# Patient Record
Sex: MALE | Race: WHITE | NOT HISPANIC OR LATINO | URBAN - METROPOLITAN AREA
[De-identification: names, ages, dates, MRNs, and addresses within clinical notes are randomized per-mention and may not be internally consistent; named-entity substitution may affect disease eponyms.]

---

## 2017-02-19 ENCOUNTER — EMERGENCY (EMERGENCY)
Facility: HOSPITAL | Age: 67
LOS: 1 days | Discharge: HOME | End: 2017-02-19
Attending: EMERGENCY MEDICINE | Admitting: EMERGENCY MEDICINE
Payer: COMMERCIAL

## 2017-02-19 VITALS
DIASTOLIC BLOOD PRESSURE: 60 MMHG | TEMPERATURE: 98 F | HEART RATE: 80 BPM | WEIGHT: 265 LBS | SYSTOLIC BLOOD PRESSURE: 166 MMHG | RESPIRATION RATE: 16 BRPM | OXYGEN SATURATION: 94 % | HEIGHT: 69 IN

## 2017-02-19 DIAGNOSIS — Z95.1 PRESENCE OF AORTOCORONARY BYPASS GRAFT: Chronic | ICD-10-CM

## 2017-02-19 DIAGNOSIS — Z98.89 OTHER SPECIFIED POSTPROCEDURAL STATES: Chronic | ICD-10-CM

## 2017-02-19 DIAGNOSIS — I77.0 ARTERIOVENOUS FISTULA, ACQUIRED: Chronic | ICD-10-CM

## 2017-02-19 PROCEDURE — 99284 EMERGENCY DEPT VISIT MOD MDM: CPT | Mod: 25

## 2017-02-19 PROCEDURE — 73590 X-RAY EXAM OF LOWER LEG: CPT

## 2017-02-19 PROCEDURE — 73590 X-RAY EXAM OF LOWER LEG: CPT | Mod: 26,LT

## 2017-02-19 PROCEDURE — 73562 X-RAY EXAM OF KNEE 3: CPT | Mod: 26,LT

## 2017-02-19 PROCEDURE — 73562 X-RAY EXAM OF KNEE 3: CPT

## 2017-02-19 PROCEDURE — 73502 X-RAY EXAM HIP UNI 2-3 VIEWS: CPT | Mod: 26,LT

## 2017-02-19 PROCEDURE — 73610 X-RAY EXAM OF ANKLE: CPT | Mod: 26,LT

## 2017-02-19 PROCEDURE — 29515 APPLICATION SHORT LEG SPLINT: CPT | Mod: LT

## 2017-02-19 PROCEDURE — 70450 CT HEAD/BRAIN W/O DYE: CPT | Mod: 26

## 2017-02-19 PROCEDURE — 73610 X-RAY EXAM OF ANKLE: CPT

## 2017-02-19 PROCEDURE — 70450 CT HEAD/BRAIN W/O DYE: CPT

## 2017-02-19 PROCEDURE — 29515 APPLICATION SHORT LEG SPLINT: CPT

## 2017-02-19 PROCEDURE — 73502 X-RAY EXAM HIP UNI 2-3 VIEWS: CPT

## 2017-02-19 RX ORDER — ACETAMINOPHEN WITH CODEINE 300MG-30MG
1 TABLET ORAL
Qty: 18 | Refills: 0 | OUTPATIENT
Start: 2017-02-19 | End: 2017-02-22

## 2017-02-19 RX ORDER — ACETAMINOPHEN 500 MG
650 TABLET ORAL ONCE
Qty: 0 | Refills: 0 | Status: COMPLETED | OUTPATIENT
Start: 2017-02-19 | End: 2017-02-19

## 2017-02-19 RX ADMIN — Medication 650 MILLIGRAM(S): at 21:44

## 2017-02-19 RX ADMIN — Medication 650 MILLIGRAM(S): at 21:14

## 2017-02-19 NOTE — ED STATDOCS - MUSCULOSKELETAL [+], MLM
TTP medial mallelous, +swelling, erythema, limited ROM secondary to pain, cap refill < 2sec, DP and PT pulses intact, shiny atrophic skin, calf soft NT, motor and sensory sensation intact, limited ROM secondary to pain, cap refill diminished.

## 2017-02-19 NOTE — ED STATDOCS - PMH
Atrial fibrillation    CAD (coronary artery disease)    ESRD (end stage renal disease) on dialysis    Gout    HLD (hyperlipidemia)    HTN (hypertension)    Ischemic cardiomyopathy    SALVADOR on CPAP    PVD (peripheral vascular disease)    Type 2 diabetes mellitus

## 2017-02-19 NOTE — ED PROCEDURE NOTE - CPROC ED POST PROC CARE GUIDE1
Verbal/written post procedure instructions were given to patient/caregiver./Instructed patient/caregiver to follow-up with primary care physician./Keep the cast/splint/dressing clean and dry./Elevate the injured extremity as instructed./Instructed patient/caregiver regarding signs and symptoms of infection.
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
## 2017-02-19 NOTE — ED STATDOCS - NS ED MD SCRIBE ATTENDING SCRIBE SECTIONS
HIV/PAST MEDICAL/SURGICAL/SOCIAL HISTORY/REVIEW OF SYSTEMS/INTAKE ASSESSMENT/SCREENINGS/RESULTS/DISPOSITION/PROGRESS NOTE/CONSULTATIONS/SHIFT CHANGE/PHYSICAL EXAM/HISTORY OF PRESENT ILLNESS/VITAL SIGNS( Pullset)
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
## 2017-02-19 NOTE — ED ADULT NURSE NOTE - PRIMARY CARE PROVIDER
This note will not be viewable in [address] Attempted to reach pt for f/u CCM services, left vm message with RNCM contact information. Will continue attempts to reach pt. mili

## 2017-02-19 NOTE — ED STATDOCS - DETAILS:
IBhavin performed the initial face to face bedside interview with this patient regarding history of present illness, review of symptoms and relevant past medical, social and family history.  I completed an independent physical examination.  I was the initial provider who evaluated this patient.  The history, relevant review of systems, past medical and surgical history, medical decision making, and physical examination was documented by the scribe in my presence and I attest to the accuracy of the documentation.  I have signed out the follow up of any pending tests (i.e. labs, radiological studies) to the ACP.  I have communicated the patient’s plan of care and disposition with the ACP.
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
## 2017-02-27 ENCOUNTER — APPOINTMENT (OUTPATIENT)
Dept: ORTHOPEDIC SURGERY | Facility: CLINIC | Age: 67
End: 2017-02-27

## 2017-02-27 VITALS
BODY MASS INDEX: 38.51 KG/M2 | HEART RATE: 42 BPM | DIASTOLIC BLOOD PRESSURE: 50 MMHG | TEMPERATURE: 98.2 F | WEIGHT: 260 LBS | SYSTOLIC BLOOD PRESSURE: 125 MMHG | HEIGHT: 69 IN

## 2017-02-27 DIAGNOSIS — Z86.69 PERSONAL HISTORY OF OTHER DISEASES OF THE NERVOUS SYSTEM AND SENSE ORGANS: ICD-10-CM

## 2017-02-27 DIAGNOSIS — Z80.9 FAMILY HISTORY OF MALIGNANT NEOPLASM, UNSPECIFIED: ICD-10-CM

## 2017-02-27 DIAGNOSIS — Z86.39 PERSONAL HISTORY OF OTHER ENDOCRINE, NUTRITIONAL AND METABOLIC DISEASE: ICD-10-CM

## 2017-02-27 DIAGNOSIS — Z78.9 OTHER SPECIFIED HEALTH STATUS: ICD-10-CM

## 2017-02-27 DIAGNOSIS — Z86.79 PERSONAL HISTORY OF OTHER DISEASES OF THE CIRCULATORY SYSTEM: ICD-10-CM

## 2017-02-27 DIAGNOSIS — Z99.2 DEPENDENCE ON RENAL DIALYSIS: ICD-10-CM

## 2017-02-27 RX ORDER — EZETIMIBE 10 MG/1
TABLET ORAL
Refills: 0 | Status: ACTIVE | COMMUNITY

## 2017-02-27 RX ORDER — WARFARIN SODIUM 2 MG/ML
INJECTION, POWDER, LYOPHILIZED, FOR SOLUTION INTRAVENOUS
Refills: 0 | Status: ACTIVE | COMMUNITY

## 2017-02-27 RX ORDER — METOPROLOL TARTRATE 1 MG/ML
INJECTION, SOLUTION INTRAVENOUS
Refills: 0 | Status: ACTIVE | COMMUNITY

## 2017-02-27 RX ORDER — ROSUVASTATIN CALCIUM 5 MG/1
5 TABLET, FILM COATED ORAL
Refills: 0 | Status: ACTIVE | COMMUNITY

## 2017-02-27 RX ORDER — INSULIN ASPART 100 [IU]/ML
INJECTION, SUSPENSION SUBCUTANEOUS
Refills: 0 | Status: ACTIVE | COMMUNITY

## 2017-02-27 RX ORDER — AMLODIPINE BESYLATE 10 MG/1
10 TABLET ORAL
Refills: 0 | Status: ACTIVE | COMMUNITY

## 2017-02-27 RX ORDER — INSULIN GLARGINE 100 [IU]/ML
100 INJECTION, SOLUTION SUBCUTANEOUS
Refills: 0 | Status: ACTIVE | COMMUNITY

## 2017-02-27 RX ORDER — FINASTERIDE 5 MG/1
5 TABLET, FILM COATED ORAL
Refills: 0 | Status: ACTIVE | COMMUNITY

## 2017-03-01 ENCOUNTER — FORM ENCOUNTER (OUTPATIENT)
Age: 67
End: 2017-03-01

## 2017-03-02 ENCOUNTER — APPOINTMENT (OUTPATIENT)
Dept: MRI IMAGING | Facility: CLINIC | Age: 67
End: 2017-03-02

## 2017-03-02 ENCOUNTER — OUTPATIENT (OUTPATIENT)
Dept: OUTPATIENT SERVICES | Facility: HOSPITAL | Age: 67
LOS: 1 days | End: 2017-03-02
Payer: COMMERCIAL

## 2017-03-02 DIAGNOSIS — Z98.89 OTHER SPECIFIED POSTPROCEDURAL STATES: Chronic | ICD-10-CM

## 2017-03-02 DIAGNOSIS — M25.552 PAIN IN LEFT HIP: ICD-10-CM

## 2017-03-02 DIAGNOSIS — I77.0 ARTERIOVENOUS FISTULA, ACQUIRED: Chronic | ICD-10-CM

## 2017-03-02 DIAGNOSIS — Z95.1 PRESENCE OF AORTOCORONARY BYPASS GRAFT: Chronic | ICD-10-CM

## 2017-03-02 DIAGNOSIS — Z00.8 ENCOUNTER FOR OTHER GENERAL EXAMINATION: ICD-10-CM

## 2017-03-02 PROCEDURE — 73721 MRI JNT OF LWR EXTRE W/O DYE: CPT

## 2017-03-10 ENCOUNTER — APPOINTMENT (OUTPATIENT)
Dept: ORTHOPEDIC SURGERY | Facility: CLINIC | Age: 67
End: 2017-03-10

## 2017-03-10 VITALS
SYSTOLIC BLOOD PRESSURE: 121 MMHG | DIASTOLIC BLOOD PRESSURE: 68 MMHG | TEMPERATURE: 98.6 F | HEART RATE: 61 BPM | BODY MASS INDEX: 38.51 KG/M2 | HEIGHT: 69 IN | WEIGHT: 260 LBS

## 2017-03-10 DIAGNOSIS — S73.102A UNSPECIFIED SPRAIN OF LEFT HIP, INITIAL ENCOUNTER: ICD-10-CM

## 2017-03-24 ENCOUNTER — OUTPATIENT (OUTPATIENT)
Dept: OUTPATIENT SERVICES | Facility: HOSPITAL | Age: 67
LOS: 1 days | End: 2017-03-24
Payer: COMMERCIAL

## 2017-03-24 DIAGNOSIS — Z98.89 OTHER SPECIFIED POSTPROCEDURAL STATES: Chronic | ICD-10-CM

## 2017-03-24 DIAGNOSIS — S73.102D UNSPECIFIED SPRAIN OF LEFT HIP, SUBSEQUENT ENCOUNTER: ICD-10-CM

## 2017-03-24 DIAGNOSIS — Z95.1 PRESENCE OF AORTOCORONARY BYPASS GRAFT: Chronic | ICD-10-CM

## 2017-03-24 DIAGNOSIS — S82.55XD NONDISPLACED FRACTURE OF MEDIAL MALLEOLUS OF LEFT TIBIA, SUBSEQUENT ENCOUNTER FOR CLOSED FRACTURE WITH ROUTINE HEALING: ICD-10-CM

## 2017-03-24 DIAGNOSIS — Z51.89 ENCOUNTER FOR OTHER SPECIFIED AFTERCARE: ICD-10-CM

## 2017-03-24 DIAGNOSIS — I77.0 ARTERIOVENOUS FISTULA, ACQUIRED: Chronic | ICD-10-CM

## 2017-03-30 ENCOUNTER — APPOINTMENT (OUTPATIENT)
Dept: ORTHOPEDIC SURGERY | Facility: CLINIC | Age: 67
End: 2017-03-30

## 2017-03-30 VITALS
HEIGHT: 69 IN | BODY MASS INDEX: 38.51 KG/M2 | DIASTOLIC BLOOD PRESSURE: 63 MMHG | SYSTOLIC BLOOD PRESSURE: 142 MMHG | HEART RATE: 57 BPM | TEMPERATURE: 98.7 F | WEIGHT: 260 LBS

## 2017-04-04 PROCEDURE — 97163 PT EVAL HIGH COMPLEX 45 MIN: CPT

## 2017-04-04 PROCEDURE — 97010 HOT OR COLD PACKS THERAPY: CPT

## 2017-04-04 PROCEDURE — 97110 THERAPEUTIC EXERCISES: CPT

## 2017-04-04 PROCEDURE — 97116 GAIT TRAINING THERAPY: CPT

## 2017-05-01 ENCOUNTER — APPOINTMENT (OUTPATIENT)
Dept: ORTHOPEDIC SURGERY | Facility: CLINIC | Age: 67
End: 2017-05-01

## 2017-06-12 ENCOUNTER — APPOINTMENT (OUTPATIENT)
Dept: ORTHOPEDIC SURGERY | Facility: CLINIC | Age: 67
End: 2017-06-12

## 2017-06-12 VITALS
HEIGHT: 69 IN | WEIGHT: 260 LBS | HEART RATE: 64 BPM | DIASTOLIC BLOOD PRESSURE: 68 MMHG | SYSTOLIC BLOOD PRESSURE: 140 MMHG | BODY MASS INDEX: 38.51 KG/M2

## 2017-06-21 ENCOUNTER — APPOINTMENT (OUTPATIENT)
Dept: CT IMAGING | Facility: CLINIC | Age: 67
End: 2017-06-21

## 2017-06-21 ENCOUNTER — OUTPATIENT (OUTPATIENT)
Dept: OUTPATIENT SERVICES | Facility: HOSPITAL | Age: 67
LOS: 1 days | End: 2017-06-21
Payer: COMMERCIAL

## 2017-06-21 DIAGNOSIS — Z98.89 OTHER SPECIFIED POSTPROCEDURAL STATES: Chronic | ICD-10-CM

## 2017-06-21 DIAGNOSIS — I77.0 ARTERIOVENOUS FISTULA, ACQUIRED: Chronic | ICD-10-CM

## 2017-06-21 DIAGNOSIS — Z95.1 PRESENCE OF AORTOCORONARY BYPASS GRAFT: Chronic | ICD-10-CM

## 2017-06-21 DIAGNOSIS — R91.1 SOLITARY PULMONARY NODULE: ICD-10-CM

## 2017-06-21 PROCEDURE — 71250 CT THORAX DX C-: CPT | Mod: 26

## 2017-06-21 PROCEDURE — 71250 CT THORAX DX C-: CPT

## 2017-08-14 ENCOUNTER — APPOINTMENT (OUTPATIENT)
Dept: ORTHOPEDIC SURGERY | Facility: CLINIC | Age: 67
End: 2017-08-14
Payer: COMMERCIAL

## 2017-08-14 VITALS
TEMPERATURE: 97.9 F | BODY MASS INDEX: 38.51 KG/M2 | DIASTOLIC BLOOD PRESSURE: 55 MMHG | HEIGHT: 69 IN | HEART RATE: 49 BPM | SYSTOLIC BLOOD PRESSURE: 158 MMHG | WEIGHT: 260 LBS

## 2017-08-14 DIAGNOSIS — S82.425D: ICD-10-CM

## 2017-08-14 PROCEDURE — 99214 OFFICE O/P EST MOD 30 MIN: CPT

## 2017-08-14 PROCEDURE — 73610 X-RAY EXAM OF ANKLE: CPT | Mod: LT

## 2017-08-30 ENCOUNTER — OUTPATIENT (OUTPATIENT)
Dept: OUTPATIENT SERVICES | Facility: HOSPITAL | Age: 67
LOS: 1 days | End: 2017-08-30
Payer: COMMERCIAL

## 2017-08-30 DIAGNOSIS — Z51.89 ENCOUNTER FOR OTHER SPECIFIED AFTERCARE: ICD-10-CM

## 2017-08-30 DIAGNOSIS — S82.55XD NONDISPLACED FRACTURE OF MEDIAL MALLEOLUS OF LEFT TIBIA, SUBSEQUENT ENCOUNTER FOR CLOSED FRACTURE WITH ROUTINE HEALING: ICD-10-CM

## 2017-08-30 DIAGNOSIS — Z98.89 OTHER SPECIFIED POSTPROCEDURAL STATES: Chronic | ICD-10-CM

## 2017-08-30 DIAGNOSIS — I77.0 ARTERIOVENOUS FISTULA, ACQUIRED: Chronic | ICD-10-CM

## 2017-08-30 DIAGNOSIS — Z95.1 PRESENCE OF AORTOCORONARY BYPASS GRAFT: Chronic | ICD-10-CM

## 2017-11-20 ENCOUNTER — APPOINTMENT (OUTPATIENT)
Dept: ORTHOPEDIC SURGERY | Facility: CLINIC | Age: 67
End: 2017-11-20
Payer: MEDICARE

## 2017-11-20 VITALS
HEIGHT: 69 IN | DIASTOLIC BLOOD PRESSURE: 70 MMHG | BODY MASS INDEX: 38.51 KG/M2 | SYSTOLIC BLOOD PRESSURE: 172 MMHG | HEART RATE: 56 BPM | WEIGHT: 260 LBS

## 2017-11-20 DIAGNOSIS — S82.55XD NONDISPLACED FRACTURE OF MEDIAL MALLEOLUS OF LEFT TIBIA, SUBSEQUENT ENCOUNTER FOR CLOSED FRACTURE WITH ROUTINE HEALING: ICD-10-CM

## 2017-11-20 DIAGNOSIS — L97.322 NON-PRESSURE CHRONIC ULCER OF LEFT ANKLE WITH FAT LAYER EXPOSED: ICD-10-CM

## 2017-11-20 DIAGNOSIS — M25.552 PAIN IN LEFT HIP: ICD-10-CM

## 2017-11-20 PROCEDURE — 73610 X-RAY EXAM OF ANKLE: CPT | Mod: LT

## 2017-11-20 PROCEDURE — 99215 OFFICE O/P EST HI 40 MIN: CPT

## 2017-11-20 PROCEDURE — 73502 X-RAY EXAM HIP UNI 2-3 VIEWS: CPT | Mod: LT

## 2017-11-20 RX ORDER — FOLIC ACID/VIT B COMPLEX AND C 0.8 MG
TABLET ORAL
Qty: 90 | Refills: 0 | Status: ACTIVE | COMMUNITY
Start: 2016-05-16

## 2017-11-21 PROBLEM — L97.322 SKIN ULCER OF LEFT ANKLE WITH FAT LAYER EXPOSED: Status: ACTIVE | Noted: 2017-06-12

## 2017-11-21 PROBLEM — S82.55XD CLOSED NONDISPLACED FRACTURE OF MEDIAL MALLEOLUS OF LEFT TIBIA WITH ROUTINE HEALING: Status: ACTIVE | Noted: 2017-02-27

## 2018-01-12 PROCEDURE — G8978: CPT | Mod: CK

## 2018-01-12 PROCEDURE — 97110 THERAPEUTIC EXERCISES: CPT

## 2018-01-12 PROCEDURE — 97010 HOT OR COLD PACKS THERAPY: CPT

## 2018-01-12 PROCEDURE — G8979: CPT | Mod: CJ

## 2018-01-12 PROCEDURE — 97163 PT EVAL HIGH COMPLEX 45 MIN: CPT

## 2018-01-12 PROCEDURE — 97140 MANUAL THERAPY 1/> REGIONS: CPT

## 2018-01-12 PROCEDURE — 97750 PHYSICAL PERFORMANCE TEST: CPT

## 2018-06-07 ENCOUNTER — APPOINTMENT (OUTPATIENT)
Dept: ORTHOPEDIC SURGERY | Facility: CLINIC | Age: 68
End: 2018-06-07

## 2018-06-18 ENCOUNTER — OTHER (OUTPATIENT)
Age: 68
End: 2018-06-18

## 2018-06-25 ENCOUNTER — APPOINTMENT (OUTPATIENT)
Dept: ORTHOPEDIC SURGERY | Facility: CLINIC | Age: 68
End: 2018-06-25
Payer: MEDICARE

## 2018-06-25 VITALS — WEIGHT: 260 LBS | BODY MASS INDEX: 38.51 KG/M2 | HEIGHT: 69 IN

## 2018-06-25 PROCEDURE — 99214 OFFICE O/P EST MOD 30 MIN: CPT

## 2018-06-25 PROCEDURE — 73502 X-RAY EXAM HIP UNI 2-3 VIEWS: CPT | Mod: LT

## 2018-10-10 ENCOUNTER — EMERGENCY (EMERGENCY)
Facility: HOSPITAL | Age: 68
LOS: 1 days | Discharge: OTHER INSTITUTION | End: 2018-10-10
Attending: EMERGENCY MEDICINE
Payer: MEDICARE

## 2018-10-10 VITALS
DIASTOLIC BLOOD PRESSURE: 82 MMHG | TEMPERATURE: 99 F | HEART RATE: 115 BPM | HEIGHT: 70 IN | RESPIRATION RATE: 22 BRPM | SYSTOLIC BLOOD PRESSURE: 181 MMHG | OXYGEN SATURATION: 96 % | WEIGHT: 259.93 LBS

## 2018-10-10 VITALS
TEMPERATURE: 99 F | DIASTOLIC BLOOD PRESSURE: 77 MMHG | OXYGEN SATURATION: 100 % | RESPIRATION RATE: 20 BRPM | SYSTOLIC BLOOD PRESSURE: 162 MMHG | HEART RATE: 110 BPM

## 2018-10-10 DIAGNOSIS — Z98.89 OTHER SPECIFIED POSTPROCEDURAL STATES: Chronic | ICD-10-CM

## 2018-10-10 DIAGNOSIS — I77.0 ARTERIOVENOUS FISTULA, ACQUIRED: Chronic | ICD-10-CM

## 2018-10-10 DIAGNOSIS — Z95.1 PRESENCE OF AORTOCORONARY BYPASS GRAFT: Chronic | ICD-10-CM

## 2018-10-10 LAB
ALBUMIN SERPL ELPH-MCNC: 4.1 G/DL — SIGNIFICANT CHANGE UP (ref 3.3–5.2)
ALP SERPL-CCNC: 115 U/L — SIGNIFICANT CHANGE UP (ref 40–120)
ALT FLD-CCNC: 24 U/L — SIGNIFICANT CHANGE UP
ANION GAP SERPL CALC-SCNC: 15 MMOL/L — SIGNIFICANT CHANGE UP (ref 5–17)
APPEARANCE UR: CLEAR — SIGNIFICANT CHANGE UP
APTT BLD: 33 SEC — SIGNIFICANT CHANGE UP (ref 27.5–37.4)
AST SERPL-CCNC: 38 U/L — SIGNIFICANT CHANGE UP
BACTERIA # UR AUTO: ABNORMAL
BASOPHILS NFR BLD AUTO: 1 % — SIGNIFICANT CHANGE UP (ref 0–2)
BILIRUB SERPL-MCNC: 0.9 MG/DL — SIGNIFICANT CHANGE UP (ref 0.4–2)
BILIRUB UR-MCNC: NEGATIVE — SIGNIFICANT CHANGE UP
BUN SERPL-MCNC: 36 MG/DL — HIGH (ref 8–20)
CALCIUM SERPL-MCNC: 9.9 MG/DL — SIGNIFICANT CHANGE UP (ref 8.6–10.2)
CHLORIDE SERPL-SCNC: 106 MMOL/L — SIGNIFICANT CHANGE UP (ref 98–107)
CO2 SERPL-SCNC: 19 MMOL/L — LOW (ref 22–29)
COLOR SPEC: YELLOW — SIGNIFICANT CHANGE UP
CREAT SERPL-MCNC: 1.18 MG/DL — SIGNIFICANT CHANGE UP (ref 0.5–1.3)
DIFF PNL FLD: ABNORMAL
EPI CELLS # UR: NEGATIVE — SIGNIFICANT CHANGE UP
GLUCOSE SERPL-MCNC: 268 MG/DL — HIGH (ref 70–115)
GLUCOSE UR QL: 1000 MG/DL
HCT VFR BLD CALC: 40.2 % — LOW (ref 42–52)
HGB BLD-MCNC: 12.5 G/DL — LOW (ref 14–18)
INR BLD: 1.62 RATIO — HIGH (ref 0.88–1.16)
KETONES UR-MCNC: ABNORMAL
LACTATE BLDV-MCNC: 1.5 MMOL/L — SIGNIFICANT CHANGE UP (ref 0.5–2)
LEUKOCYTE ESTERASE UR-ACNC: ABNORMAL
LYMPHOCYTES # BLD AUTO: 5 % — LOW (ref 20–55)
MCHC RBC-ENTMCNC: 29.8 PG — SIGNIFICANT CHANGE UP (ref 27–31)
MCHC RBC-ENTMCNC: 31.1 G/DL — LOW (ref 32–36)
MCV RBC AUTO: 95.7 FL — HIGH (ref 80–94)
MONOCYTES NFR BLD AUTO: 4 % — SIGNIFICANT CHANGE UP (ref 3–10)
NEUTROPHILS NFR BLD AUTO: 86 % — HIGH (ref 37–73)
NITRITE UR-MCNC: NEGATIVE — SIGNIFICANT CHANGE UP
PH UR: 6 — SIGNIFICANT CHANGE UP (ref 5–8)
PLATELET # BLD AUTO: 165 K/UL — SIGNIFICANT CHANGE UP (ref 150–400)
POTASSIUM SERPL-MCNC: 4 MMOL/L — SIGNIFICANT CHANGE UP (ref 3.5–5.3)
POTASSIUM SERPL-SCNC: 4 MMOL/L — SIGNIFICANT CHANGE UP (ref 3.5–5.3)
PROT SERPL-MCNC: 6.8 G/DL — SIGNIFICANT CHANGE UP (ref 6.6–8.7)
PROT UR-MCNC: 100 MG/DL
PROTHROM AB SERPL-ACNC: 18 SEC — HIGH (ref 9.8–12.7)
RBC # BLD: 4.2 M/UL — LOW (ref 4.6–6.2)
RBC # FLD: 15.1 % — SIGNIFICANT CHANGE UP (ref 11–15.6)
RBC CASTS # UR COMP ASSIST: ABNORMAL /HPF (ref 0–4)
SODIUM SERPL-SCNC: 140 MMOL/L — SIGNIFICANT CHANGE UP (ref 135–145)
SP GR SPEC: 1.02 — SIGNIFICANT CHANGE UP (ref 1.01–1.02)
UROBILINOGEN FLD QL: NEGATIVE MG/DL — SIGNIFICANT CHANGE UP
WBC # BLD: 11.1 K/UL — HIGH (ref 4.8–10.8)
WBC # FLD AUTO: 11.1 K/UL — HIGH (ref 4.8–10.8)
WBC UR QL: ABNORMAL

## 2018-10-10 PROCEDURE — 96375 TX/PRO/DX INJ NEW DRUG ADDON: CPT

## 2018-10-10 PROCEDURE — 85027 COMPLETE CBC AUTOMATED: CPT

## 2018-10-10 PROCEDURE — 96365 THER/PROPH/DIAG IV INF INIT: CPT

## 2018-10-10 PROCEDURE — 85730 THROMBOPLASTIN TIME PARTIAL: CPT

## 2018-10-10 PROCEDURE — 36415 COLL VENOUS BLD VENIPUNCTURE: CPT

## 2018-10-10 PROCEDURE — 81001 URINALYSIS AUTO W/SCOPE: CPT

## 2018-10-10 PROCEDURE — 74176 CT ABD & PELVIS W/O CONTRAST: CPT

## 2018-10-10 PROCEDURE — 87040 BLOOD CULTURE FOR BACTERIA: CPT

## 2018-10-10 PROCEDURE — 74176 CT ABD & PELVIS W/O CONTRAST: CPT | Mod: 26

## 2018-10-10 PROCEDURE — 73630 X-RAY EXAM OF FOOT: CPT | Mod: 26,RT

## 2018-10-10 PROCEDURE — 87086 URINE CULTURE/COLONY COUNT: CPT

## 2018-10-10 PROCEDURE — 73630 X-RAY EXAM OF FOOT: CPT

## 2018-10-10 PROCEDURE — 71045 X-RAY EXAM CHEST 1 VIEW: CPT

## 2018-10-10 PROCEDURE — 96372 THER/PROPH/DIAG INJ SC/IM: CPT | Mod: XU

## 2018-10-10 PROCEDURE — 80053 COMPREHEN METABOLIC PANEL: CPT

## 2018-10-10 PROCEDURE — 83605 ASSAY OF LACTIC ACID: CPT

## 2018-10-10 PROCEDURE — 99291 CRITICAL CARE FIRST HOUR: CPT

## 2018-10-10 PROCEDURE — 82962 GLUCOSE BLOOD TEST: CPT

## 2018-10-10 PROCEDURE — 85610 PROTHROMBIN TIME: CPT

## 2018-10-10 PROCEDURE — 99285 EMERGENCY DEPT VISIT HI MDM: CPT | Mod: 25

## 2018-10-10 PROCEDURE — 71045 X-RAY EXAM CHEST 1 VIEW: CPT | Mod: 26

## 2018-10-10 RX ORDER — CEFEPIME 1 G/1
2000 INJECTION, POWDER, FOR SOLUTION INTRAMUSCULAR; INTRAVENOUS EVERY 12 HOURS
Qty: 0 | Refills: 0 | Status: DISCONTINUED | OUTPATIENT
Start: 2018-10-10 | End: 2018-10-15

## 2018-10-10 RX ORDER — FUROSEMIDE 40 MG
0 TABLET ORAL
Qty: 0 | Refills: 0 | COMMUNITY

## 2018-10-10 RX ORDER — INSULIN HUMAN 100 [IU]/ML
10 INJECTION, SOLUTION SUBCUTANEOUS ONCE
Qty: 0 | Refills: 0 | Status: COMPLETED | OUTPATIENT
Start: 2018-10-10 | End: 2018-10-10

## 2018-10-10 RX ORDER — SODIUM CHLORIDE 9 MG/ML
3700 INJECTION INTRAMUSCULAR; INTRAVENOUS; SUBCUTANEOUS ONCE
Qty: 0 | Refills: 0 | Status: COMPLETED | OUTPATIENT
Start: 2018-10-10 | End: 2018-10-10

## 2018-10-10 RX ORDER — WARFARIN SODIUM 2.5 MG/1
0 TABLET ORAL
Qty: 0 | Refills: 0 | COMMUNITY

## 2018-10-10 RX ORDER — ROSUVASTATIN CALCIUM 5 MG/1
0 TABLET ORAL
Qty: 0 | Refills: 0 | COMMUNITY

## 2018-10-10 RX ORDER — ACETAMINOPHEN 500 MG
975 TABLET ORAL ONCE
Qty: 0 | Refills: 0 | Status: COMPLETED | OUTPATIENT
Start: 2018-10-10 | End: 2018-10-10

## 2018-10-10 RX ORDER — ACETAMINOPHEN 500 MG
650 TABLET ORAL ONCE
Qty: 0 | Refills: 0 | Status: COMPLETED | OUTPATIENT
Start: 2018-10-10 | End: 2018-10-10

## 2018-10-10 RX ORDER — VANCOMYCIN HCL 1 G
1000 VIAL (EA) INTRAVENOUS ONCE
Qty: 0 | Refills: 0 | Status: COMPLETED | OUTPATIENT
Start: 2018-10-10 | End: 2018-10-10

## 2018-10-10 RX ORDER — INSULIN HUMAN 100 [IU]/ML
0 INJECTION, SOLUTION SUBCUTANEOUS
Qty: 0 | Refills: 0 | COMMUNITY

## 2018-10-10 RX ORDER — PIPERACILLIN AND TAZOBACTAM 4; .5 G/20ML; G/20ML
3.38 INJECTION, POWDER, LYOPHILIZED, FOR SOLUTION INTRAVENOUS ONCE
Qty: 0 | Refills: 0 | Status: DISCONTINUED | OUTPATIENT
Start: 2018-10-10 | End: 2018-10-10

## 2018-10-10 RX ORDER — EZETIMIBE 10 MG/1
0 TABLET ORAL
Qty: 0 | Refills: 0 | COMMUNITY

## 2018-10-10 RX ORDER — INSULIN GLARGINE 100 [IU]/ML
10 INJECTION, SOLUTION SUBCUTANEOUS AT BEDTIME
Qty: 0 | Refills: 0 | Status: DISCONTINUED | OUTPATIENT
Start: 2018-10-10 | End: 2018-10-15

## 2018-10-10 RX ORDER — AMLODIPINE BESYLATE 2.5 MG/1
0 TABLET ORAL
Qty: 0 | Refills: 0 | COMMUNITY

## 2018-10-10 RX ORDER — CEFTRIAXONE 500 MG/1
1 INJECTION, POWDER, FOR SOLUTION INTRAMUSCULAR; INTRAVENOUS ONCE
Qty: 0 | Refills: 0 | Status: DISCONTINUED | OUTPATIENT
Start: 2018-10-10 | End: 2018-10-10

## 2018-10-10 RX ORDER — SEVELAMER CARBONATE 2400 MG/1
0 POWDER, FOR SUSPENSION ORAL
Qty: 0 | Refills: 0 | COMMUNITY

## 2018-10-10 RX ORDER — METOPROLOL TARTRATE 50 MG
0 TABLET ORAL
Qty: 0 | Refills: 0 | COMMUNITY

## 2018-10-10 RX ORDER — INSULIN GLARGINE 100 [IU]/ML
0 INJECTION, SOLUTION SUBCUTANEOUS
Qty: 0 | Refills: 0 | COMMUNITY

## 2018-10-10 RX ORDER — FINASTERIDE 5 MG/1
0 TABLET, FILM COATED ORAL
Qty: 0 | Refills: 0 | COMMUNITY

## 2018-10-10 RX ADMIN — CEFEPIME 2000 MILLIGRAM(S): 1 INJECTION, POWDER, FOR SOLUTION INTRAMUSCULAR; INTRAVENOUS at 19:07

## 2018-10-10 RX ADMIN — Medication 975 MILLIGRAM(S): at 18:50

## 2018-10-10 RX ADMIN — INSULIN HUMAN 10 UNIT(S): 100 INJECTION, SOLUTION SUBCUTANEOUS at 22:29

## 2018-10-10 RX ADMIN — Medication 250 MILLIGRAM(S): at 19:07

## 2018-10-10 RX ADMIN — Medication 975 MILLIGRAM(S): at 17:49

## 2018-10-10 RX ADMIN — CEFEPIME 100 MILLIGRAM(S): 1 INJECTION, POWDER, FOR SOLUTION INTRAMUSCULAR; INTRAVENOUS at 17:49

## 2018-10-10 RX ADMIN — INSULIN GLARGINE 10 UNIT(S): 100 INJECTION, SOLUTION SUBCUTANEOUS at 22:17

## 2018-10-10 RX ADMIN — SODIUM CHLORIDE 3700 MILLILITER(S): 9 INJECTION INTRAMUSCULAR; INTRAVENOUS; SUBCUTANEOUS at 17:40

## 2018-10-10 NOTE — ED PROVIDER NOTE - CARE PLAN
Principal Discharge DX:	Sepsis, due to unspecified organism  Secondary Diagnosis:	Hematuria, unspecified type

## 2018-10-10 NOTE — ED ADULT NURSE REASSESSMENT NOTE - NS ED NURSE REASSESS COMMENT FT1
As per Dr. Gonzalez, ok for pt to take home meds including Cellcept, Prograf and Eliquis. As per Dr. Gonzalez, ok for pt to take home meds including Cellcept, Prograf and Eliquis.  Pt placed on 2LNC as per Dr. Gonzalez,

## 2018-10-10 NOTE — ED PROVIDER NOTE - OBJECTIVE STATEMENT
67 year old male with PMH renal transplant 7/7 that was subsequently complicated with a wound dehiscence in August requiring wound vac to be placed, CAD, DM, HTN, HLD presents with fever and hematuria. Pt states that he had a general malaise and body aches yesterday. Then beginning today he began to have fever, chills, and hematuria. He denies chest pain, SOB, abd pain, nausea, vomiting, diarrhea.

## 2018-10-10 NOTE — CONSULT NOTE ADULT - SUBJECTIVE AND OBJECTIVE BOX
Ricky is a 67y old  Male who presents with a chief complaint of  hematuria.    HPI:   Hx renal transplant. Pt has been feeling ill for past couple of days and noted gross hematuria this am with temp 100.3 at home . He also had high glucose. Hx of DM and has been taking his meds. Pt had seroma post transplant and still has wound vac.    PAST MEDICAL & SURGICAL HISTORY:  SALVADOR on CPAP  Gout  PVD (peripheral vascular disease)  Atrial fibrillation  CAD (coronary artery disease)  HLD (hyperlipidemia)  Ischemic cardiomyopathy  HTN (hypertension)  Type 2 diabetes mellitus  ESRD (end stage renal disease) on dialysis  AV fistula  History of vitrectomy  S/P CABG x 2  History of varicose vein stripping      FAMILY HISTORY:  [place]    Social History: Non smoker    MEDICATIONS  (STANDING):  cefepime   IVPB 2000 milliGRAM(s) IV Intermittent every 12 hours  insulin glargine Injectable (LANTUS) 10 Unit(s) SubCutaneous at bedtime    MEDICATIONS  (PRN):   Meds reviewed    Allergies    No Known Allergies        REVIEW OF SYSTEMS:    CONSTITUTIONAL:   feels weak  EYES: No eye pain, visual disturbances, or discharge  ENMT:  No difficulty hearing, tinnitus, vertigo; No sinus or throat pain  NECK: No pain or stiffness  BREASTS: No pain, masses, or nipple discharge  RESPIRATORY: neg  CARDIOVASCULAR: No chest pain, palpitations, dizziness,   GASTROINTESTINAL: No abdominal or epigastric pain. No nausea, vomiting, or hematemesis; No diarrhea or constipation LLQ Vac with skin erythema.   GENITOURINARY: + hematuria,   NEUROLOGICAL: No headaches, memory loss, loss of strength, numbness, or tremors  SKIN: neg  LYMPH NODES: No enlarged glands  ENDOCRINE: No heat or cold intolerance; No hair loss  MUSCULOSKELETAL: Chronic leg  edema legs   PSYCHIATRIC: No depression, anxiety, mood swings, or difficulty sleeping  HEME/LYMPH: No easy bruising, or bleeding gums  ALLERY AND IMMUNOLOGIC: No hives or eczema      Vital Signs Last 24 Hrs  T(C): 36.6 (10 Oct 2018 20:06), Max: 38.1 (10 Oct 2018 17:22)  T(F): 97.9 (10 Oct 2018 20:06), Max: 100.6 (10 Oct 2018 17:22)  HR: 93 (10 Oct 2018 21:30) (93 - 115)  BP: 166/77 (10 Oct 2018 21:30) (127/63 - 181/82)  BP(mean): --  RR: 20 (10 Oct 2018 21:30) (20 - 22)  SpO2: 99% (10 Oct 2018 21:30) (93% - 99%)  Daily Height in cm: 177.8 (10 Oct 2018 16:51)        PHYSICAL EXAM:    GENERAL: appears chronically ill, oriented.  HEAD:  Atraumatic, Normocephalic  EYES: EOMI, PERRLA, conjunctiva and sclera clear  ENMT: No tonsillar erythema, exudates, or enlargement; Moist mucous membranes,   NECK: Supple, neck  veins   NERVOUS SYSTEM:  Alert & Oriented X3, Good concentration; Motor Strength wnl upper and lower extremities;  CHEST/LUNG: Clear to percussion bilaterally; No rales, rhonchi, wheezing, or rubs  HEART: IRegular rate; No  rubs, or gallops  ABDOMEN: Soft, Nontender, distended; Bowel sounds present, body wall and flank edema with wound vac.  EXTREMITIES:  Leg edema, arm access  LYMPH: No lymphadenopathy noted  SKIN: No rashes or lesions, pale      LABS:                        12.5   11.1  )-----------( 165      ( 10 Oct 2018 17:43 )             40.2     10-10    140  |  106  |  36.0<H>  ----------------------------<  268<H>  4.0   |  19.0<L>  |  1.18    Ca    9.9      10 Oct 2018 17:43    TPro  6.8  /  Alb  4.1  /  TBili  0.9  /  DBili  x   /  AST  38  /  ALT  24  /  AlkPhos  115  10-10    PT/INR - ( 10 Oct 2018 17:43 )   PT: 18.0 sec;   INR: 1.62 ratio         PTT - ( 10 Oct 2018 17:43 )  PTT:33.0 sec  Urinalysis Basic - ( 10 Oct 2018 19:28 )    Color: Yellow / Appearance: Clear / S.020 / pH: x  Gluc: x / Ketone: Trace  / Bili: Negative / Urobili: Negative mg/dL   Blood: x / Protein: 100 mg/dL / Nitrite: Negative   Leuk Esterase: Small / RBC: 3-5 /HPF / WBC 11-25   Sq Epi: x / Non Sq Epi: Negative / Bacteria: Occasional              RADIOLOGY & ADDITIONAL TESTS:

## 2018-10-10 NOTE — ED PROVIDER NOTE - ABDOMINAL EXAM
soft/nontender.../nondistended/wound vac in place to abd wall with surround erythema, but no induration nor tenderness

## 2018-10-10 NOTE — CONSULT NOTE ADULT - ATTENDING COMMENTS
IV antibiotic, will need urgent tap of perinephric collection and if pus a drain. Tranplant meds same for now but may need to hold cellcept    and continue the other meds.

## 2018-10-10 NOTE — ED PROVIDER NOTE - MEDICAL DECISION MAKING DETAILS
Hematuria and sepsis in the setting of recent kidney transplant. Will require IV Abx and likely transfer to transplant center

## 2018-10-10 NOTE — ED ADULT NURSE NOTE - OBJECTIVE STATEMENT
CODE SEPSIS called. patient comes to ED sent by kidney MD. Raheem complaining of hematuria, nausea, vomiting, fever. patient had a left kidney transplant 7/7/18, wound vac 8/1/18.

## 2018-10-10 NOTE — ED ADULT TRIAGE NOTE - CHIEF COMPLAINT QUOTE
Renal transplant three months ago. Hematuria, blood in urine, and vomiting started this morning.  Sent from renal (Dr. Maciel)

## 2018-10-11 ENCOUNTER — INPATIENT (INPATIENT)
Facility: HOSPITAL | Age: 68
LOS: 4 days | Discharge: HOME | DRG: 698 | End: 2018-10-16
Payer: MEDICARE

## 2018-10-11 VITALS
RESPIRATION RATE: 20 BRPM | SYSTOLIC BLOOD PRESSURE: 194 MMHG | HEIGHT: 70 IN | DIASTOLIC BLOOD PRESSURE: 91 MMHG | OXYGEN SATURATION: 99 % | TEMPERATURE: 98 F | WEIGHT: 265.66 LBS | HEART RATE: 111 BPM

## 2018-10-11 DIAGNOSIS — Z94.0 KIDNEY TRANSPLANT STATUS: ICD-10-CM

## 2018-10-11 DIAGNOSIS — N10 ACUTE PYELONEPHRITIS: ICD-10-CM

## 2018-10-11 DIAGNOSIS — Z94.0 KIDNEY TRANSPLANT STATUS: Chronic | ICD-10-CM

## 2018-10-11 DIAGNOSIS — E11.9 TYPE 2 DIABETES MELLITUS WITHOUT COMPLICATIONS: ICD-10-CM

## 2018-10-11 DIAGNOSIS — I48.91 UNSPECIFIED ATRIAL FIBRILLATION: ICD-10-CM

## 2018-10-11 DIAGNOSIS — D89.9 DISORDER INVOLVING THE IMMUNE MECHANISM, UNSPECIFIED: ICD-10-CM

## 2018-10-11 DIAGNOSIS — I77.0 ARTERIOVENOUS FISTULA, ACQUIRED: Chronic | ICD-10-CM

## 2018-10-11 DIAGNOSIS — S81.802A UNSPECIFIED OPEN WOUND, LEFT LOWER LEG, INITIAL ENCOUNTER: Chronic | ICD-10-CM

## 2018-10-11 DIAGNOSIS — Z95.1 PRESENCE OF AORTOCORONARY BYPASS GRAFT: Chronic | ICD-10-CM

## 2018-10-11 DIAGNOSIS — N15.9 RENAL TUBULO-INTERSTITIAL DISEASE, UNSPECIFIED: ICD-10-CM

## 2018-10-11 DIAGNOSIS — N12 TUBULO-INTERSTITIAL NEPHRITIS, NOT SPECIFIED AS ACUTE OR CHRONIC: ICD-10-CM

## 2018-10-11 DIAGNOSIS — Z98.89 OTHER SPECIFIED POSTPROCEDURAL STATES: Chronic | ICD-10-CM

## 2018-10-11 DIAGNOSIS — L97.519 NON-PRESSURE CHRONIC ULCER OF OTHER PART OF RIGHT FOOT WITH UNSPECIFIED SEVERITY: Chronic | ICD-10-CM

## 2018-10-11 DIAGNOSIS — I10 ESSENTIAL (PRIMARY) HYPERTENSION: ICD-10-CM

## 2018-10-11 DIAGNOSIS — E78.5 HYPERLIPIDEMIA, UNSPECIFIED: ICD-10-CM

## 2018-10-11 LAB
ANION GAP SERPL CALC-SCNC: 14 MMOL/L — SIGNIFICANT CHANGE UP (ref 5–17)
APTT BLD: 34.1 SEC — SIGNIFICANT CHANGE UP (ref 27.5–37.4)
BLD GP AB SCN SERPL QL: NEGATIVE — SIGNIFICANT CHANGE UP
BUN SERPL-MCNC: 36 MG/DL — HIGH (ref 7–23)
CALCIUM SERPL-MCNC: 9.5 MG/DL — SIGNIFICANT CHANGE UP (ref 8.4–10.5)
CHLORIDE SERPL-SCNC: 107 MMOL/L — SIGNIFICANT CHANGE UP (ref 96–108)
CO2 SERPL-SCNC: 15 MMOL/L — LOW (ref 22–31)
CREAT FLD-MCNC: 1.12 MG/DL — SIGNIFICANT CHANGE UP
CREAT SERPL-MCNC: 1.09 MG/DL — SIGNIFICANT CHANGE UP (ref 0.5–1.3)
CULTURE RESULTS: NO GROWTH — SIGNIFICANT CHANGE UP
GAS PNL BLDV: SIGNIFICANT CHANGE UP
GLUCOSE BLDC GLUCOMTR-MCNC: 120 MG/DL — HIGH (ref 70–99)
GLUCOSE BLDC GLUCOMTR-MCNC: 121 MG/DL — HIGH (ref 70–99)
GLUCOSE BLDC GLUCOMTR-MCNC: 122 MG/DL — HIGH (ref 70–99)
GLUCOSE BLDC GLUCOMTR-MCNC: 125 MG/DL — HIGH (ref 70–99)
GLUCOSE BLDC GLUCOMTR-MCNC: 153 MG/DL — HIGH (ref 70–99)
GLUCOSE BLDC GLUCOMTR-MCNC: 215 MG/DL — HIGH (ref 70–99)
GLUCOSE BLDC GLUCOMTR-MCNC: 219 MG/DL — HIGH (ref 70–99)
GLUCOSE BLDC GLUCOMTR-MCNC: 261 MG/DL — HIGH (ref 70–99)
GLUCOSE BLDC GLUCOMTR-MCNC: 88 MG/DL — SIGNIFICANT CHANGE UP (ref 70–99)
GLUCOSE SERPL-MCNC: 323 MG/DL — HIGH (ref 70–99)
HCT VFR BLD CALC: 41.8 % — SIGNIFICANT CHANGE UP (ref 39–50)
HGB BLD-MCNC: 13.4 G/DL — SIGNIFICANT CHANGE UP (ref 13–17)
INR BLD: 1.52 RATIO — HIGH (ref 0.88–1.16)
MAGNESIUM SERPL-MCNC: 1.8 MG/DL — SIGNIFICANT CHANGE UP (ref 1.6–2.6)
MCHC RBC-ENTMCNC: 31.1 PG — SIGNIFICANT CHANGE UP (ref 27–34)
MCHC RBC-ENTMCNC: 32.1 GM/DL — SIGNIFICANT CHANGE UP (ref 32–36)
MCV RBC AUTO: 96.9 FL — SIGNIFICANT CHANGE UP (ref 80–100)
PHOSPHATE SERPL-MCNC: 2.5 MG/DL — SIGNIFICANT CHANGE UP (ref 2.5–4.5)
PLATELET # BLD AUTO: 156 K/UL — SIGNIFICANT CHANGE UP (ref 150–400)
POTASSIUM SERPL-MCNC: 4.5 MMOL/L — SIGNIFICANT CHANGE UP (ref 3.5–5.3)
POTASSIUM SERPL-SCNC: 4.5 MMOL/L — SIGNIFICANT CHANGE UP (ref 3.5–5.3)
PROCALCITONIN SERPL-MCNC: 0.1 NG/ML — SIGNIFICANT CHANGE UP (ref 0.02–0.1)
PROTHROM AB SERPL-ACNC: 16.7 SEC — HIGH (ref 9.8–12.7)
RBC # BLD: 4.31 M/UL — SIGNIFICANT CHANGE UP (ref 4.2–5.8)
RBC # FLD: 14.4 % — SIGNIFICANT CHANGE UP (ref 10.3–14.5)
RH IG SCN BLD-IMP: POSITIVE — SIGNIFICANT CHANGE UP
SODIUM SERPL-SCNC: 136 MMOL/L — SIGNIFICANT CHANGE UP (ref 135–145)
SPECIMEN SOURCE: SIGNIFICANT CHANGE UP
VANCOMYCIN TROUGH SERPL-MCNC: 16.2 UG/ML — SIGNIFICANT CHANGE UP (ref 10–20)
WBC # BLD: 8.8 K/UL — SIGNIFICANT CHANGE UP (ref 3.8–10.5)
WBC # FLD AUTO: 8.8 K/UL — SIGNIFICANT CHANGE UP (ref 3.8–10.5)

## 2018-10-11 PROCEDURE — 93010 ELECTROCARDIOGRAM REPORT: CPT

## 2018-10-11 PROCEDURE — 76942 ECHO GUIDE FOR BIOPSY: CPT | Mod: 26

## 2018-10-11 PROCEDURE — 99291 CRITICAL CARE FIRST HOUR: CPT

## 2018-10-11 PROCEDURE — 76776 US EXAM K TRANSPL W/DOPPLER: CPT | Mod: 26,LT

## 2018-10-11 PROCEDURE — 71045 X-RAY EXAM CHEST 1 VIEW: CPT | Mod: 26

## 2018-10-11 PROCEDURE — 99223 1ST HOSP IP/OBS HIGH 75: CPT | Mod: GC

## 2018-10-11 PROCEDURE — 99232 SBSQ HOSP IP/OBS MODERATE 35: CPT | Mod: GC

## 2018-10-11 PROCEDURE — 10160 PNXR ASPIR ABSC HMTMA BULLA: CPT

## 2018-10-11 RX ORDER — MEROPENEM 1 G/30ML
1000 INJECTION INTRAVENOUS EVERY 8 HOURS
Qty: 0 | Refills: 0 | Status: DISCONTINUED | OUTPATIENT
Start: 2018-10-11 | End: 2018-10-11

## 2018-10-11 RX ORDER — INSULIN GLARGINE 100 [IU]/ML
10 INJECTION, SOLUTION SUBCUTANEOUS AT BEDTIME
Qty: 0 | Refills: 0 | Status: DISCONTINUED | OUTPATIENT
Start: 2018-10-11 | End: 2018-10-11

## 2018-10-11 RX ORDER — INSULIN GLARGINE 100 [IU]/ML
20 INJECTION, SOLUTION SUBCUTANEOUS AT BEDTIME
Qty: 0 | Refills: 0 | Status: DISCONTINUED | OUTPATIENT
Start: 2018-10-11 | End: 2018-10-15

## 2018-10-11 RX ORDER — ACETAMINOPHEN 500 MG
650 TABLET ORAL ONCE
Qty: 0 | Refills: 0 | Status: COMPLETED | OUTPATIENT
Start: 2018-10-11 | End: 2018-10-11

## 2018-10-11 RX ORDER — VANCOMYCIN HCL 1 G
1000 VIAL (EA) INTRAVENOUS ONCE
Qty: 0 | Refills: 0 | Status: COMPLETED | OUTPATIENT
Start: 2018-10-11 | End: 2018-10-11

## 2018-10-11 RX ORDER — MAGNESIUM SULFATE 500 MG/ML
2 VIAL (ML) INJECTION ONCE
Qty: 0 | Refills: 0 | Status: COMPLETED | OUTPATIENT
Start: 2018-10-11 | End: 2018-10-11

## 2018-10-11 RX ORDER — ISOSORBIDE MONONITRATE 60 MG/1
1 TABLET, EXTENDED RELEASE ORAL
Qty: 0 | Refills: 0 | COMMUNITY

## 2018-10-11 RX ORDER — ENOXAPARIN SODIUM 100 MG/ML
40 INJECTION SUBCUTANEOUS DAILY
Qty: 0 | Refills: 0 | Status: DISCONTINUED | OUTPATIENT
Start: 2018-10-11 | End: 2018-10-12

## 2018-10-11 RX ORDER — MEROPENEM 1 G/30ML
INJECTION INTRAVENOUS
Qty: 0 | Refills: 0 | Status: DISCONTINUED | OUTPATIENT
Start: 2018-10-11 | End: 2018-10-11

## 2018-10-11 RX ORDER — SENNA PLUS 8.6 MG/1
2 TABLET ORAL AT BEDTIME
Qty: 0 | Refills: 0 | Status: DISCONTINUED | OUTPATIENT
Start: 2018-10-11 | End: 2018-10-16

## 2018-10-11 RX ORDER — TAMSULOSIN HYDROCHLORIDE 0.4 MG/1
0.4 CAPSULE ORAL AT BEDTIME
Qty: 0 | Refills: 0 | Status: DISCONTINUED | OUTPATIENT
Start: 2018-10-11 | End: 2018-10-16

## 2018-10-11 RX ORDER — INSULIN HUMAN 100 [IU]/ML
1 INJECTION, SOLUTION SUBCUTANEOUS
Qty: 100 | Refills: 0 | Status: DISCONTINUED | OUTPATIENT
Start: 2018-10-11 | End: 2018-10-11

## 2018-10-11 RX ORDER — TACROLIMUS 5 MG/1
1.5 CAPSULE ORAL
Qty: 0 | Refills: 0 | Status: DISCONTINUED | OUTPATIENT
Start: 2018-10-11 | End: 2018-10-15

## 2018-10-11 RX ORDER — INSULIN GLARGINE 100 [IU]/ML
10 INJECTION, SOLUTION SUBCUTANEOUS ONCE
Qty: 0 | Refills: 0 | Status: DISCONTINUED | OUTPATIENT
Start: 2018-10-11 | End: 2018-10-11

## 2018-10-11 RX ORDER — DOCUSATE SODIUM 100 MG
100 CAPSULE ORAL THREE TIMES A DAY
Qty: 0 | Refills: 0 | Status: DISCONTINUED | OUTPATIENT
Start: 2018-10-11 | End: 2018-10-16

## 2018-10-11 RX ORDER — MEROPENEM 1 G/30ML
1000 INJECTION INTRAVENOUS ONCE
Qty: 0 | Refills: 0 | Status: DISCONTINUED | OUTPATIENT
Start: 2018-10-11 | End: 2018-10-11

## 2018-10-11 RX ORDER — VANCOMYCIN HCL 1 G
1000 VIAL (EA) INTRAVENOUS EVERY 12 HOURS
Qty: 0 | Refills: 0 | Status: DISCONTINUED | OUTPATIENT
Start: 2018-10-11 | End: 2018-10-11

## 2018-10-11 RX ORDER — VALGANCICLOVIR 450 MG/1
450 TABLET, FILM COATED ORAL DAILY
Qty: 0 | Refills: 0 | Status: DISCONTINUED | OUTPATIENT
Start: 2018-10-11 | End: 2018-10-16

## 2018-10-11 RX ORDER — ISOSORBIDE MONONITRATE 60 MG/1
60 TABLET, EXTENDED RELEASE ORAL DAILY
Qty: 0 | Refills: 0 | Status: DISCONTINUED | OUTPATIENT
Start: 2018-10-11 | End: 2018-10-15

## 2018-10-11 RX ORDER — CHLORHEXIDINE GLUCONATE 213 G/1000ML
1 SOLUTION TOPICAL
Qty: 0 | Refills: 0 | Status: DISCONTINUED | OUTPATIENT
Start: 2018-10-11 | End: 2018-10-16

## 2018-10-11 RX ORDER — INSULIN LISPRO 100/ML
VIAL (ML) SUBCUTANEOUS EVERY 4 HOURS
Qty: 0 | Refills: 0 | Status: DISCONTINUED | OUTPATIENT
Start: 2018-10-11 | End: 2018-10-11

## 2018-10-11 RX ORDER — VANCOMYCIN HCL 1 G
1000 VIAL (EA) INTRAVENOUS EVERY 8 HOURS
Qty: 0 | Refills: 0 | Status: DISCONTINUED | OUTPATIENT
Start: 2018-10-11 | End: 2018-10-12

## 2018-10-11 RX ORDER — INFLUENZA VIRUS VACCINE 15; 15; 15; 15 UG/.5ML; UG/.5ML; UG/.5ML; UG/.5ML
0.5 SUSPENSION INTRAMUSCULAR ONCE
Qty: 0 | Refills: 0 | Status: COMPLETED | OUTPATIENT
Start: 2018-10-11 | End: 2018-10-16

## 2018-10-11 RX ORDER — ONDANSETRON 8 MG/1
4 TABLET, FILM COATED ORAL ONCE
Qty: 0 | Refills: 0 | Status: COMPLETED | OUTPATIENT
Start: 2018-10-11 | End: 2018-10-11

## 2018-10-11 RX ORDER — SODIUM CHLORIDE 9 MG/ML
1000 INJECTION INTRAMUSCULAR; INTRAVENOUS; SUBCUTANEOUS
Qty: 0 | Refills: 0 | Status: DISCONTINUED | OUTPATIENT
Start: 2018-10-11 | End: 2018-10-12

## 2018-10-11 RX ORDER — PANTOPRAZOLE SODIUM 20 MG/1
40 TABLET, DELAYED RELEASE ORAL
Qty: 0 | Refills: 0 | Status: DISCONTINUED | OUTPATIENT
Start: 2018-10-11 | End: 2018-10-16

## 2018-10-11 RX ORDER — INSULIN LISPRO 100/ML
VIAL (ML) SUBCUTANEOUS EVERY 4 HOURS
Qty: 0 | Refills: 0 | Status: DISCONTINUED | OUTPATIENT
Start: 2018-10-11 | End: 2018-10-12

## 2018-10-11 RX ORDER — DOCUSATE SODIUM 100 MG
1 CAPSULE ORAL
Qty: 0 | Refills: 0 | COMMUNITY

## 2018-10-11 RX ORDER — FINASTERIDE 5 MG/1
5 TABLET, FILM COATED ORAL DAILY
Qty: 0 | Refills: 0 | Status: DISCONTINUED | OUTPATIENT
Start: 2018-10-11 | End: 2018-10-16

## 2018-10-11 RX ORDER — CEFEPIME 1 G/1
2000 INJECTION, POWDER, FOR SOLUTION INTRAMUSCULAR; INTRAVENOUS EVERY 12 HOURS
Qty: 0 | Refills: 0 | Status: DISCONTINUED | OUTPATIENT
Start: 2018-10-11 | End: 2018-10-12

## 2018-10-11 RX ORDER — ATORVASTATIN CALCIUM 80 MG/1
20 TABLET, FILM COATED ORAL AT BEDTIME
Qty: 0 | Refills: 0 | Status: DISCONTINUED | OUTPATIENT
Start: 2018-10-11 | End: 2018-10-15

## 2018-10-11 RX ORDER — INSULIN GLARGINE 100 [IU]/ML
10 INJECTION, SOLUTION SUBCUTANEOUS ONCE
Qty: 0 | Refills: 0 | Status: COMPLETED | OUTPATIENT
Start: 2018-10-11 | End: 2018-10-11

## 2018-10-11 RX ORDER — INSULIN HUMAN 100 [IU]/ML
9 INJECTION, SOLUTION SUBCUTANEOUS ONCE
Qty: 0 | Refills: 0 | Status: COMPLETED | OUTPATIENT
Start: 2018-10-11 | End: 2018-10-11

## 2018-10-11 RX ADMIN — Medication 1 TABLET(S): at 11:30

## 2018-10-11 RX ADMIN — ONDANSETRON 4 MILLIGRAM(S): 8 TABLET, FILM COATED ORAL at 02:08

## 2018-10-11 RX ADMIN — VALGANCICLOVIR 450 MILLIGRAM(S): 450 TABLET, FILM COATED ORAL at 11:30

## 2018-10-11 RX ADMIN — TAMSULOSIN HYDROCHLORIDE 0.4 MILLIGRAM(S): 0.4 CAPSULE ORAL at 21:16

## 2018-10-11 RX ADMIN — SENNA PLUS 2 TABLET(S): 8.6 TABLET ORAL at 21:16

## 2018-10-11 RX ADMIN — INSULIN GLARGINE 20 UNIT(S): 100 INJECTION, SOLUTION SUBCUTANEOUS at 21:17

## 2018-10-11 RX ADMIN — CHLORHEXIDINE GLUCONATE 1 APPLICATION(S): 213 SOLUTION TOPICAL at 05:00

## 2018-10-11 RX ADMIN — Medication 100 MILLIGRAM(S): at 21:16

## 2018-10-11 RX ADMIN — Medication 250 MILLIGRAM(S): at 02:17

## 2018-10-11 RX ADMIN — INSULIN HUMAN 1 UNIT(S)/HR: 100 INJECTION, SOLUTION SUBCUTANEOUS at 02:44

## 2018-10-11 RX ADMIN — ISOSORBIDE MONONITRATE 60 MILLIGRAM(S): 60 TABLET, EXTENDED RELEASE ORAL at 11:30

## 2018-10-11 RX ADMIN — CEFEPIME 100 MILLIGRAM(S): 1 INJECTION, POWDER, FOR SOLUTION INTRAMUSCULAR; INTRAVENOUS at 05:00

## 2018-10-11 RX ADMIN — Medication 100 MILLIGRAM(S): at 11:30

## 2018-10-11 RX ADMIN — Medication 250 MILLIGRAM(S): at 10:23

## 2018-10-11 RX ADMIN — Medication 100 MILLIGRAM(S): at 05:00

## 2018-10-11 RX ADMIN — TACROLIMUS 1.5 MILLIGRAM(S): 5 CAPSULE ORAL at 19:33

## 2018-10-11 RX ADMIN — INSULIN GLARGINE 10 UNIT(S): 100 INJECTION, SOLUTION SUBCUTANEOUS at 02:39

## 2018-10-11 RX ADMIN — Medication 650 MILLIGRAM(S): at 16:20

## 2018-10-11 RX ADMIN — Medication 250 MILLIGRAM(S): at 19:16

## 2018-10-11 RX ADMIN — PANTOPRAZOLE SODIUM 40 MILLIGRAM(S): 20 TABLET, DELAYED RELEASE ORAL at 05:00

## 2018-10-11 RX ADMIN — INSULIN HUMAN 9 UNIT(S): 100 INJECTION, SOLUTION SUBCUTANEOUS at 02:08

## 2018-10-11 RX ADMIN — CEFEPIME 100 MILLIGRAM(S): 1 INJECTION, POWDER, FOR SOLUTION INTRAMUSCULAR; INTRAVENOUS at 17:30

## 2018-10-11 RX ADMIN — FINASTERIDE 5 MILLIGRAM(S): 5 TABLET, FILM COATED ORAL at 11:30

## 2018-10-11 RX ADMIN — Medication 50 GRAM(S): at 04:54

## 2018-10-11 RX ADMIN — Medication 4: at 21:16

## 2018-10-11 RX ADMIN — Medication 5 MILLIGRAM(S): at 05:00

## 2018-10-11 RX ADMIN — Medication 650 MILLIGRAM(S): at 15:50

## 2018-10-11 RX ADMIN — ATORVASTATIN CALCIUM 20 MILLIGRAM(S): 80 TABLET, FILM COATED ORAL at 21:16

## 2018-10-11 RX ADMIN — Medication 125 MILLIMOLE(S): at 04:54

## 2018-10-11 NOTE — CONSULT NOTE ADULT - ASSESSMENT
67M Hx transplant 7/18 c/b wound infection s/p debridement who's presenting with one day of fevers, vomiting, and hematuria.       Neuro:    Resp:  C/w NC to maintain Sp02 >92%  F/u am CXR    CV:  Tachycardia of 111 has resolved to 98  Mildly hypertensive to 165/94  C/w home medication Lipitor 67M Hx transplant 7/18 c/b wound infection s/p debridement who's presenting with one day of fevers, vomiting, and hematuria.       Neuro:  Alert and oriented x4    Resp:  C/w NC to maintain Sp02 >92%  F/u am CXR    CV:  Tachycardia of 111 has resolved to 98  Mildly hypertensive to 165/94  C/w home medication Lipitor  Imdur    GI:  NPO except meds  Senna and Colace for bowel regimen  Protonix PO as home med    Renal:  Flomax  Holding MMF/Tacro at this time  Finasteride    Heme: Lovenox for DVT ppx    ID:  Cefepime  TMP/SMX  Valcyte  Vancomycin    Endo:  Prednisone 5mg PO once a day   Lantus 20utines with 9 units pre-meal  Insulin infusion for tighter glucose control
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
## 2018-10-11 NOTE — H&P ADULT - NSHPPHYSICALEXAM_GEN_ALL_CORE
GENERAL: NAD, well-developed  HEAD:  Atraumatic, Normocephalic  EYES: EOMI, PERRLA, conjunctiva and sclera clear  NEUROLOGY: AAOx4, no focal deficits, CN grossly intact  NECK: Supple, No JVD  CHEST/LUNG: Clear to auscultation bilaterally; No wheeze  HEART: Irregularly irregular rate and rhythm  ABDOMEN: Soft, nondistended, minimally tender to palpation in LLQ around wound vac; rj-incisional erythema, blanchable  SKIN: Ulcer of right great toe, clean base, no erythema, no drainage GENERAL: NAD, well-developed  HEAD:  Atraumatic, Normocephalic  EYES: EOMI, PERRLA, conjunctiva and sclera clear  NEUROLOGY: AAOx4, no focal deficits, CN grossly intact  NECK: Supple, No JVD  CHEST/LUNG: Clear to auscultation bilaterally; No wheeze  HEART: Irregularly irregular rate and rhythm  ABDOMEN: Soft, nondistended, minimally tender to palpation in LLQ around wound vac; rj-incisional erythema, blanchable  SKIN: Ulcer of right great toe, clean base, no erythema, no drainage    VITAL SIGNS:  T(C): 36.9 (11 Oct 2018 00:30), Max: 38.1 (10 Oct 2018 17:22)  T(F): 98.4 (11 Oct 2018 00:30), Max: 100.6 (10 Oct 2018 17:22)  HR: 55 (11 Oct 2018 05:00) (55 - 115)  BP: 179/83 (11 Oct 2018 05:00) (127/63 - 194/91)  BP(mean): 119 (11 Oct 2018 05:00) (80 - 131)  RR: 21 (11 Oct 2018 05:00) (0 - 33)  SpO2: 98% (11 Oct 2018 05:00) (93% - 100%)

## 2018-10-11 NOTE — H&P ADULT - PMH
Atrial fibrillation  on Eliquis  CAD (coronary artery disease)  s/p 2 vessel CABG 2009@ [place]  Cataracts, bilateral    ESRD (end stage renal disease) on dialysis  secondary to DM; HD via Left upper extremity AVF until renal transplant 7/7/18  Gout    HLD (hyperlipidemia)    HTN (hypertension)    Ischemic cardiomyopathy    SALVADOR on CPAP  home settings CPAP 14  PVD (peripheral vascular disease)    Type 2 diabetes mellitus  on Lantus and premeal sliding scale

## 2018-10-11 NOTE — H&P ADULT - ASSESSMENT
ASSESSMENT: 67 year old male with recent h/o renal transplant 7/7/18 c/b wound dehiscence and wound vac placement 8/1/18 now presents with UTI vs perinephric abscess vs pyelonephritis of transplant kidney    PLAN:   Neurologic: no acute issues  - Tylenol as needed for pain    Respiratory: SALVADOR on CPAP  - Nocturnal CPAP 14  - Incentive spirometry, OOB to prevent atelectasis    Cardiovascular: CAD s/p CABG; HLD; HTN  - Continue home blood pressure medications    Gastrointestinal/Nutrition: no acute issues  - Maintain NPO in the event that pt needs intervention    Genitourinary/Renal: s/p renal transplant; hematuria 2/2 UTI vs perinephric abscess vs pyelonephritis of transplant kidney  - Monitor I&Os  - NS @ 100ml/hr  - Hold home Lasix  - Flomax & finasteride for BPH    Hematologic: no acute issues  - Hold Eliquis for now  - Lovenox for VTE prophylaxis    Infectious Disease: h/o renal transplant; UTI vs perinephric abscess vs pyelonephritis of transplant kidney  - Continue broad spectrum antibiotics with vancomycin, cefepime  - Follow up blood and urine cultures  - Prednisone 5mg qd for h/o transplant, hold all other immunosuppression  - Valcyte & Bactrim prophylaxis    Endocrine: DM  - s/p 10 units Lantus at [place], 10 units Lantus at [facility]  - Insulin gtt to obtain control of glucose    Disposition: SICU.     Discussed with Dr. Ortega, Dr. Chowdhury.    -Melonie Patel PAOrianaC  [id number]

## 2018-10-11 NOTE — H&P ADULT - NSHPREVIEWOFSYSTEMS_GEN_ALL_CORE
Pt reports minimal tenderness of LLQ around wound vac site. Pt denies chest pain, SOB, headache, difficulty urinating.
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
## 2018-10-11 NOTE — PROGRESS NOTE ADULT - SUBJECTIVE AND OBJECTIVE BOX
Vascular & Interventional Radiology Post-Procedure Note    Pre-Procedure Diagnosis: LLQ fluid collection  Post-Procedure Diagnosis: Same as pre.  Indications for Procedure: r/o abscess    : Bo  Assistant(s): none    Procedure Details/Findings: LLQ fluid collection aspiration yielded 7cc of serous fluid.     Complications: None  Estimated Blood Loss: Minimal  Specimen: sent for culture and creatinine  Contrast: none  Sedation: Anesthesia  Patient Condition/Disposition: Stable, back to SICU    Plan: f/u labs.

## 2018-10-11 NOTE — H&P ADULT - NSHPSOCIALHISTORY_GEN_ALL_CORE
Never smoker; seldom drinks alcohol (stopped 25 years ago); Retired ; lives at home with wife, has 2 daughters and one son

## 2018-10-11 NOTE — PROGRESS NOTE ADULT - ASSESSMENT
67 year old male with PMH of DM, HTN, HLD, A. fib on Eliquis, CAD s/p 2 vessel CABG in 2009, SALVADOR on CPAP, ESRD (2/2 DM) previously on HD  s/p DDRT to LLQ on 7/7/18 by Dr. Oswaldo Castellon at [facility] in [place], [place].  Pt's post op course was complicated by wound dehiscence and wound vac placement on 8/1/18, Admitted to SICU w/ Fever and Hematuria, found to have collection around the transplanted kidney with suspicion of abscess and pyelonephritis.

## 2018-10-11 NOTE — CONSULT NOTE ADULT - SUBJECTIVE AND OBJECTIVE BOX
SICU  Consult  Consulting surgical team: SICU  Consulting attending: Dr. Ortega    HPI: Patient is a 67M Hx DDRT in 2018 c/b wound infection ~1 month prior s/p debridement and wound vac who presented to the ED due to  hematuria, vomiting, and fevers. He developed hematuria this morning when he was passing stool. Later in the day around lunch, he developed vomiting and fevers which brought him to the hospital.      PAST MEDICAL HISTORY:  SALVADOR on CPAP  Gout  PVD (peripheral vascular disease)  Atrial fibrillation  CAD (coronary artery disease)  HLD (hyperlipidemia)  Ischemic cardiomyopathy  HTN (hypertension)  Type 2 diabetes mellitus  ESRD (end stage renal disease) on dialysis      PAST SURGICAL HISTORY:  AV fistula  History of vitrectomy  S/P CABG x 2  History of varicose vein stripping      MEDICATIONS:  atorvastatin 20 milliGRAM(s) Oral at bedtime  chlorhexidine 4% Liquid 1 Application(s) Topical <User Schedule>  docusate sodium 100 milliGRAM(s) Oral three times a day  enoxaparin Injectable 40 milliGRAM(s) SubCutaneous daily  finasteride 5 milliGRAM(s) Oral daily  insulin glargine Injectable (LANTUS) 10 Unit(s) SubCutaneous at bedtime  insulin lispro (HumaLOG) corrective regimen sliding scale   SubCutaneous every 4 hours  isosorbide   mononitrate ER Tablet (IMDUR) 60 milliGRAM(s) Oral daily  meropenem  IVPB 1000 milliGRAM(s) IV Intermittent once  meropenem  IVPB 1000 milliGRAM(s) IV Intermittent every 8 hours  meropenem  IVPB      multivitamin 1 Tablet(s) Oral daily  pantoprazole    Tablet 40 milliGRAM(s) Oral before breakfast  predniSONE   Tablet 5 milliGRAM(s) Oral daily  senna 2 Tablet(s) Oral at bedtime  tamsulosin 0.4 milliGRAM(s) Oral at bedtime  trimethoprim   80 mG/sulfamethoxazole 400 mG 1 Tablet(s) Oral daily  valGANciclovir 450 milliGRAM(s) Oral daily  vancomycin  IVPB 1000 milliGRAM(s) IV Intermittent every 12 hours      ALLERGIES:  No Known Allergies      VITALS & I/Os:  Vital Signs Last 24 Hrs  T(C): 36.9 (11 Oct 2018 00:30), Max: 38.1 (10 Oct 2018 17:22)  T(F): 98.4 (11 Oct 2018 00:30), Max: 100.6 (10 Oct 2018 17:22)  HR: 98 (11 Oct 2018 01:00) (93 - 115)  BP: 165/94 (11 Oct 2018 01:00) (127/63 - 194/91)  BP(mean): 124 (11 Oct 2018 01:00) (124 - 131)  RR: 33 (11 Oct 2018 01:00) (20 - 33)  SpO2: 99% (11 Oct 2018 01:00) (93% - 100%)    I&O's Summary      PHYSICAL EXAM:  General: No acute distress  Respiratory: Nonlabored  Cardiovascular: RRR  Abdominal: Soft, nondistended, nontender. No rebound or guarding. No organomegaly, no palpable mass.  Extremities: Warm    LABS:                        13.4   8.8   )-----------( 156      ( 11 Oct 2018 00:56 )             41.8     10-10    140  |  106  |  36.0<H>  ----------------------------<  268<H>  4.0   |  19.0<L>  |  1.18    Ca    9.9      10 Oct 2018 17:43    TPro  6.8  /  Alb  4.1  /  TBili  0.9  /  DBili  x   /  AST  38  /  ALT  24  /  AlkPhos  115  10-10    Lactate:  10-11 @ 00:52  2.0  10-10 @ 17:40  1.5    PT/INR - ( 10 Oct 2018 17:43 )   PT: 18.0 sec;   INR: 1.62 ratio         PTT - ( 10 Oct 2018 17:43 )  PTT:33.0 sec          Urinalysis Basic - ( 10 Oct 2018 19:28 )    Color: Yellow / Appearance: Clear / S.020 / pH: x  Gluc: x / Ketone: Trace  / Bili: Negative / Urobili: Negative mg/dL   Blood: x / Protein: 100 mg/dL / Nitrite: Negative   Leuk Esterase: Small / RBC: 3-5 /HPF / WBC 11-25   Sq Epi: x / Non Sq Epi: Negative / Bacteria: Occasional        IMAGING: SICU  Consult  Consulting surgical team: SICU  Consulting attending: Dr. Ortega    HPI: Patient is a 67M Hx DDRT in 2018 c/b wound infection ~1 month prior s/p debridement and wound vac who presented to the ED due to  hematuria, vomiting, and fevers. He developed hematuria this morning when he was passing stool. Later in the day around lunch, he developed vomiting and fevers which brought him to the hospital.      PAST MEDICAL HISTORY:  SALVADOR on CPAP  Gout  PVD (peripheral vascular disease)  Atrial fibrillation  CAD (coronary artery disease)  HLD (hyperlipidemia)  Ischemic cardiomyopathy  HTN (hypertension)  Type 2 diabetes mellitus  ESRD (end stage renal disease) on dialysis      PAST SURGICAL HISTORY:  AV fistula  History of vitrectomy  S/P CABG x 2  History of varicose vein stripping      MEDICATIONS:  atorvastatin 20 milliGRAM(s) Oral at bedtime  chlorhexidine 4% Liquid 1 Application(s) Topical <User Schedule>  docusate sodium 100 milliGRAM(s) Oral three times a day  enoxaparin Injectable 40 milliGRAM(s) SubCutaneous daily  finasteride 5 milliGRAM(s) Oral daily  insulin glargine Injectable (LANTUS) 10 Unit(s) SubCutaneous at bedtime  insulin lispro (HumaLOG) corrective regimen sliding scale   SubCutaneous every 4 hours  isosorbide   mononitrate ER Tablet (IMDUR) 60 milliGRAM(s) Oral daily  meropenem  IVPB 1000 milliGRAM(s) IV Intermittent once  meropenem  IVPB 1000 milliGRAM(s) IV Intermittent every 8 hours  meropenem  IVPB      multivitamin 1 Tablet(s) Oral daily  pantoprazole    Tablet 40 milliGRAM(s) Oral before breakfast  predniSONE   Tablet 5 milliGRAM(s) Oral daily  senna 2 Tablet(s) Oral at bedtime  tamsulosin 0.4 milliGRAM(s) Oral at bedtime  trimethoprim   80 mG/sulfamethoxazole 400 mG 1 Tablet(s) Oral daily  valGANciclovir 450 milliGRAM(s) Oral daily  vancomycin  IVPB 1000 milliGRAM(s) IV Intermittent every 12 hours      ALLERGIES:  No Known Allergies      VITALS & I/Os:  Vital Signs Last 24 Hrs  T(C): 36.9 (11 Oct 2018 00:30), Max: 38.1 (10 Oct 2018 17:22)  T(F): 98.4 (11 Oct 2018 00:30), Max: 100.6 (10 Oct 2018 17:22)  HR: 98 (11 Oct 2018 01:00) (93 - 115)  BP: 165/94 (11 Oct 2018 01:00) (127/63 - 194/91)  BP(mean): 124 (11 Oct 2018 01:00) (124 - 131)  RR: 33 (11 Oct 2018 01:00) (20 - 33)  SpO2: 99% (11 Oct 2018 01:00) (93% - 100%)    I&O's Summary      PHYSICAL EXAM:  General: No acute distress  Respiratory: Nonlabored  Cardiovascular: RRR  Abdominal: Soft, obese, nondistended, erythematous in the LLQ, 4x4cm vacuum sponge in place to suction.   Extremities: Warm    LABS:                        13.4   8.8   )-----------( 156      ( 11 Oct 2018 00:56 )             41.8     10-10    140  |  106  |  36.0<H>  ----------------------------<  268<H>  4.0   |  19.0<L>  |  1.18    Ca    9.9      10 Oct 2018 17:43    TPro  6.8  /  Alb  4.1  /  TBili  0.9  /  DBili  x   /  AST  38  /  ALT  24  /  AlkPhos  115  10-10    Lactate:  10-11 @ 00:52  2.0  10-10 @ 17:40  1.5    PT/INR - ( 10 Oct 2018 17:43 )   PT: 18.0 sec;   INR: 1.62 ratio         PTT - ( 10 Oct 2018 17:43 )  PTT:33.0 sec          Urinalysis Basic - ( 10 Oct 2018 19:28 )    Color: Yellow / Appearance: Clear / S.020 / pH: x  Gluc: x / Ketone: Trace  / Bili: Negative / Urobili: Negative mg/dL   Blood: x / Protein: 100 mg/dL / Nitrite: Negative   Leuk Esterase: Small / RBC: 3-5 /HPF / WBC 11-25   Sq Epi: x / Non Sq Epi: Negative / Bacteria: Occasional        IMAGING:    < from: CT Abdomen and Pelvis No Cont (10.10.18 @ 18:24) >  FINDINGS:   There is a LEFT pelvic transplanted kidney with the severe diffuse   perinephric pelvic inflammatory edema with a LEFT anterolateral of   loculated fluid collection measuring 9.1 x 2 cm in diameter concerning   for a perinephric abscess. No stone identified. I there is also LEFT   lower pelvic rectus muscle abscess with diffuse swelling, the multiple   pockets of air and a draining sinus in the LEFT anterior pelvic region   with diffuse wall thickening.  No gross hydronephrosis identified. The bladder is displaced towards the   RIGHT..  Native kidneys are severely atrophic and nonfunctioning.    There is a mild LEFT pleural effusion.  There is no free intra-abdominal air or ascites.     The unopacified liver, spleen, pancreas, adrenal glands are normal.   Status post cholecystectomy.  There is no intra or extrahepatic biliary ductal dilatation.    The stomach, duodenum, small, and large bowel and appendix are normal.    .        Prostate and seminal vesicles within normal limits.    There are no retroperitoneal masses or abnormal lymphadenopathy.  The retroperitoneal vessels show atherosclerotic calcified plaques   throughout  nondilated abdominal aorta, mesenteric vessels and iliac   arteries. Osseous structures intact.   IMPRESSION:     LEFT pelvic renal transplant shows severe diffuse perinephric edema with   the LEFT perinephric pelvic fluid collection concerning for abscess   secondary to a severe pyelonephritis .  No hydronephrosis or stone disease seen .  No bladder outlet obstruction.  LEFT anterior abdominal wall rectus muscle abscess with air with a sinus   track LEFT anterior abdominal wall.    < end of copied text > SICU  Consult  Consulting surgical team: SICU  Consulting attending: Dr. Ortega    HPI: Patient is a 67M Hx CABG ('09 2 vessel one of which was LAD), SALVADOR on CPA, ESRD (2/2 DM) s/p DDRT in 2018 c/b wound infection ~1 month prior s/p debridement and wound vac who presented to the ED due to  hematuria, vomiting, and fevers. He developed hematuria this morning when he was passing stool. Later in the day around lunch, he developed vomiting and fevers which brought him to the hospital.      PAST MEDICAL HISTORY:  SALVADOR on CPAP  Gout  PVD (peripheral vascular disease)  Atrial fibrillation  CAD (coronary artery disease)  HLD (hyperlipidemia)  Ischemic cardiomyopathy  HTN (hypertension)  Type 2 diabetes mellitus  ESRD (end stage renal disease) on dialysis      PAST SURGICAL HISTORY:  AV fistula  History of vitrectomy  S/P CABG x 2  History of varicose vein stripping      MEDICATIONS:  atorvastatin 20 milliGRAM(s) Oral at bedtime  chlorhexidine 4% Liquid 1 Application(s) Topical <User Schedule>  docusate sodium 100 milliGRAM(s) Oral three times a day  enoxaparin Injectable 40 milliGRAM(s) SubCutaneous daily  finasteride 5 milliGRAM(s) Oral daily  insulin glargine Injectable (LANTUS) 10 Unit(s) SubCutaneous at bedtime  insulin lispro (HumaLOG) corrective regimen sliding scale   SubCutaneous every 4 hours  isosorbide   mononitrate ER Tablet (IMDUR) 60 milliGRAM(s) Oral daily  meropenem  IVPB 1000 milliGRAM(s) IV Intermittent once  meropenem  IVPB 1000 milliGRAM(s) IV Intermittent every 8 hours  meropenem  IVPB      multivitamin 1 Tablet(s) Oral daily  pantoprazole    Tablet 40 milliGRAM(s) Oral before breakfast  predniSONE   Tablet 5 milliGRAM(s) Oral daily  senna 2 Tablet(s) Oral at bedtime  tamsulosin 0.4 milliGRAM(s) Oral at bedtime  trimethoprim   80 mG/sulfamethoxazole 400 mG 1 Tablet(s) Oral daily  valGANciclovir 450 milliGRAM(s) Oral daily  vancomycin  IVPB 1000 milliGRAM(s) IV Intermittent every 12 hours      ALLERGIES:  No Known Allergies      VITALS & I/Os:  Vital Signs Last 24 Hrs  T(C): 36.9 (11 Oct 2018 00:30), Max: 38.1 (10 Oct 2018 17:22)  T(F): 98.4 (11 Oct 2018 00:30), Max: 100.6 (10 Oct 2018 17:22)  HR: 98 (11 Oct 2018 01:00) (93 - 115)  BP: 165/94 (11 Oct 2018 01:00) (127/63 - 194/91)  BP(mean): 124 (11 Oct 2018 01:00) (124 - 131)  RR: 33 (11 Oct 2018 01:00) (20 - 33)  SpO2: 99% (11 Oct 2018 01:00) (93% - 100%)    I&O's Summary      PHYSICAL EXAM:  General: No acute distress  Respiratory: Nonlabored  Cardiovascular: RRR  Abdominal: Soft, obese, nondistended, erythematous in the LLQ, 4x4cm vacuum sponge in place to suction.   Extremities: Warm    LABS:                        13.4   8.8   )-----------( 156      ( 11 Oct 2018 00:56 )             41.8     10-10    140  |  106  |  36.0<H>  ----------------------------<  268<H>  4.0   |  19.0<L>  |  1.18    Ca    9.9      10 Oct 2018 17:43    TPro  6.8  /  Alb  4.1  /  TBili  0.9  /  DBili  x   /  AST  38  /  ALT  24  /  AlkPhos  115  10-10    Lactate:  10-11 @ 00:52  2.0  10-10 @ 17:40  1.5    PT/INR - ( 10 Oct 2018 17:43 )   PT: 18.0 sec;   INR: 1.62 ratio         PTT - ( 10 Oct 2018 17:43 )  PTT:33.0 sec          Urinalysis Basic - ( 10 Oct 2018 19:28 )    Color: Yellow / Appearance: Clear / S.020 / pH: x  Gluc: x / Ketone: Trace  / Bili: Negative / Urobili: Negative mg/dL   Blood: x / Protein: 100 mg/dL / Nitrite: Negative   Leuk Esterase: Small / RBC: 3-5 /HPF / WBC 11-25   Sq Epi: x / Non Sq Epi: Negative / Bacteria: Occasional        IMAGING:    < from: CT Abdomen and Pelvis No Cont (10.10.18 @ 18:24) >  FINDINGS:   There is a LEFT pelvic transplanted kidney with the severe diffuse   perinephric pelvic inflammatory edema with a LEFT anterolateral of   loculated fluid collection measuring 9.1 x 2 cm in diameter concerning   for a perinephric abscess. No stone identified. I there is also LEFT   lower pelvic rectus muscle abscess with diffuse swelling, the multiple   pockets of air and a draining sinus in the LEFT anterior pelvic region   with diffuse wall thickening.  No gross hydronephrosis identified. The bladder is displaced towards the   RIGHT..  Native kidneys are severely atrophic and nonfunctioning.    There is a mild LEFT pleural effusion.  There is no free intra-abdominal air or ascites.     The unopacified liver, spleen, pancreas, adrenal glands are normal.   Status post cholecystectomy.  There is no intra or extrahepatic biliary ductal dilatation.    The stomach, duodenum, small, and large bowel and appendix are normal.    .        Prostate and seminal vesicles within normal limits.    There are no retroperitoneal masses or abnormal lymphadenopathy.  The retroperitoneal vessels show atherosclerotic calcified plaques   throughout  nondilated abdominal aorta, mesenteric vessels and iliac   arteries. Osseous structures intact.   IMPRESSION:     LEFT pelvic renal transplant shows severe diffuse perinephric edema with   the LEFT perinephric pelvic fluid collection concerning for abscess   secondary to a severe pyelonephritis .  No hydronephrosis or stone disease seen .  No bladder outlet obstruction.  LEFT anterior abdominal wall rectus muscle abscess with air with a sinus   track LEFT anterior abdominal wall.    < end of copied text >

## 2018-10-11 NOTE — PHYSICAL THERAPY INITIAL EVALUATION ADULT - PRECAUTIONS/LIMITATIONS, REHAB EVAL
Pt presented to [facility] on the morning of 10/10/18 after he experienced dark hematuria ("color of red wine") and a fever at home. CT demonstrated "left pelvic renal transplant with severe diffuse perinephric edema and perinephric pelvic fluid collection concerning for abscess secondary to a severe pyelonephritis. Left anterior abdominal wall rectus muscle abscess with air with a sinus track to left anterior abdominal wall." Pt was transferred to [facility] for further management.

## 2018-10-11 NOTE — CONSULT NOTE ADULT - PROBLEM SELECTOR RECOMMENDATION 9
s/p DDRT to LLQ on 7/7/18  Allograft functioning well, post op period c/b wound dehiscence and collection, on wound vac  Scr satble  Monitor Mg and Phos everyday  Bowel regimen

## 2018-10-11 NOTE — PROGRESS NOTE ADULT - ASSESSMENT
67 year old male with recent h/o renal transplant 7/7/18 c/b wound dehiscence and wound vac placement 8/1/18. Presented  10/11 with UTI vs perinephric abscess vs pyelonephritis of transplant kidney. Now s/p left lower quadrant fluid collection aspiration with IR.    Plan:   -   -   -   -   - 67 year old male with recent h/o renal transplant 7/7/18 c/b wound dehiscence and wound vac placement 8/1/18. Presented  10/11 with UTI vs perinephric abscess vs pyelonephritis of transplant kidney. Now s/p left lower quadrant fluid collection aspiration with IR on 10/11.    Plan:   - Pain control  - C/w vanco and cefepine abx  - Regular carb consistent diet  - Monitor IR access site for signs of bleeding or hematoma  - DVT ppx: Lovenox

## 2018-10-11 NOTE — PROGRESS NOTE ADULT - SUBJECTIVE AND OBJECTIVE BOX
SURGICAL POST-OP CHECK NOTE:    Procedure: Left lower quadrant fluid collection aspiration     Subjective: Resting comfortably in bed. Pain controlled. No N/V, CP, or SOB.    Vital Signs Last 24 Hrs  T(C): 36.9 (11 Oct 2018 19:00), Max: 37.2 (11 Oct 2018 03:00)  T(F): 98.5 (11 Oct 2018 19:00), Max: 99 (11 Oct 2018 03:00)  HR: 100 (11 Oct 2018 19:00) (55 - 112)  BP: 123/69 (11 Oct 2018 19:00) (123/69 - 194/91)  BP(mean): 91 (11 Oct 2018 19:00) (80 - 131)  RR: 26 (11 Oct 2018 19:00) (0 - 37)  SpO2: 93% (11 Oct 2018 19:00) (91% - 100%)  I&O's Summary    10 Oct 2018 07:01  -  11 Oct 2018 07:00  --------------------------------------------------------  IN: 946.5 mL / OUT: 350 mL / NET: 596.5 mL    11 Oct 2018 07:01  -  11 Oct 2018 19:19  --------------------------------------------------------  IN: 1325 mL / OUT: 950 mL / NET: 375 mL                            13.4   8.8   )-----------( 156      ( 11 Oct 2018 00:56 )             41.8     10-11    136  |  107  |  36<H>  ----------------------------<  323<H>  4.5   |  15<L>  |  1.09    Ca    9.5      11 Oct 2018 00:56  Phos  2.5     10-11  Mg     1.8     10-11    TPro  6.8  /  Alb  4.1  /  TBili  0.9  /  DBili  x   /  AST  38  /  ALT  24  /  AlkPhos  115  10-10   PT/INR - ( 11 Oct 2018 00:56 )   PT: 16.7 sec;   INR: 1.52 ratio         PTT - ( 11 Oct 2018 00:56 )  PTT:34.1 sec    PHYSICAL EXAM:  Gen: NAD, well-developed  Neuro: AAOX3, PERRL, CNII-XII grossly intact  CV: nml S1/S2, RRR  Pulm: CTABL  GI: abd soft, nontender, nondistended, bsx4 quadrants  Ext: 2+ pulses throughout SURGICAL POST-OP CHECK NOTE:    Procedure: Left lower quadrant fluid collection aspiration     Subjective: Resting comfortably in SICU bed. Pain controlled. No N/V, CP, or SOB.    Vital Signs Last 24 Hrs  T(C): 36.9 (11 Oct 2018 19:00), Max: 37.2 (11 Oct 2018 03:00)  T(F): 98.5 (11 Oct 2018 19:00), Max: 99 (11 Oct 2018 03:00)  HR: 100 (11 Oct 2018 19:00) (55 - 112)  BP: 123/69 (11 Oct 2018 19:00) (123/69 - 194/91)  BP(mean): 91 (11 Oct 2018 19:00) (80 - 131)  RR: 26 (11 Oct 2018 19:00) (0 - 37)  SpO2: 93% (11 Oct 2018 19:00) (91% - 100%)  I&O's Summary    10 Oct 2018 07:01  -  11 Oct 2018 07:00  --------------------------------------------------------  IN: 946.5 mL / OUT: 350 mL / NET: 596.5 mL    11 Oct 2018 07:01  -  11 Oct 2018 19:19  --------------------------------------------------------  IN: 1325 mL / OUT: 950 mL / NET: 375 mL                            13.4   8.8   )-----------( 156      ( 11 Oct 2018 00:56 )             41.8     10-11    136  |  107  |  36<H>  ----------------------------<  323<H>  4.5   |  15<L>  |  1.09    Ca    9.5      11 Oct 2018 00:56  Phos  2.5     10-11  Mg     1.8     10-11    TPro  6.8  /  Alb  4.1  /  TBili  0.9  /  DBili  x   /  AST  38  /  ALT  24  /  AlkPhos  115  10-10   PT/INR - ( 11 Oct 2018 00:56 )   PT: 16.7 sec;   INR: 1.52 ratio         PTT - ( 11 Oct 2018 00:56 )  PTT:34.1 sec    PHYSICAL EXAM:  Gen: NAD, well-developed  Neuro: AAOX3, PERRL, CNII-XII grossly intact  CV: nml S1/S2, RRR  Pulm: CTABL  GI: abd soft, nontender, obese, bsx4 quadrants, mid low VAC in place, LLQ IR access site with gauze and tegaderm  Ext: 2+ pulses throughout

## 2018-10-11 NOTE — H&P ADULT - HISTORY OF PRESENT ILLNESS
67 year old male with PMH of DM, HTN, HLD, A. fib on Eliquis, CAD s/p 2 vessel CABG in 2009, SALVADOR on CPAP, ESRD (2/2 DM) previously on HD via LUE AVF for 4.5 years now s/p DDRT to LLQ on 7/7/18 by Dr. Oswaldo Castellon at [facility] in [place], [place]. Pt's post op course was complicated by wound dehiscence on August 1, 2012 and he is s/p wound vac placement. Pt follows up at [facility] 1x a week for vac change, and a home visiting nurse comes to change the vac 2x a week (total 3x week vac change, last changed AM of 10/10/18).     Pt presented to [facility] on the morning of 10/10/18 after he experienced dark hematuria (color of red wine) and a fever at home. He called his nephrologist, Dr. Maciel, who instructed him to go to the ED. 67 year old male with PMH of DM, HTN, HLD, A. fib on Eliquis, CAD s/p 2 vessel CABG in 2009, SALVADOR on CPAP, ESRD (2/2 DM) previously on HD via LUE AVF for 4.5 years now s/p DDRT to LLQ on 7/7/18 by Dr. Oswaldo Castellon at [facility] in [place], [place]. Pt's post op course was complicated by wound dehiscence on August 1, 2012 and he is s/p wound vac placement. Pt follows up at [facility] 1x a week for vac change, and a home visiting nurse comes to change the vac 2x a week (total 3x week vac change, last changed AM of 10/10/18).     Pt presented to [facility] on the morning of 10/10/18 after he experienced dark hematuria (color of red wine) and a fever at home. He called his nephrologist, Dr. Maciel, who instructed him to go to the ED. At [place] he had a CT which demonstrated left pelvic renal transplant with severe diffuse perinephric edema with and perinephric pelvic fluid collection concerning for abscess secondary to a severe pyelonephritis. Left anterior abdominal wall rectus muscle abscess with air with a sinus track to left anterior abdominal wall. Pt was given 3700ml NS and 1000mg Vancomycin, 2000mg Cefepime after sending blood and urine cultures. He was hyperglycemic and given 10 units Lantus and 10 units SC human insulin. Pt was then transferred to [facility] for further management. In the SICU at [facility], pt arrived hemodynamically stable. Antibiotics continued. Additional 10 units Lantus given and pt started on insulin infusion. 67 year old male with PMH of DM, HTN, HLD, A. fib on Eliquis, CAD s/p 2 vessel CABG in 2009, SALVADOR on CPAP, ESRD (2/2 DM) previously on HD via LUE AVF for 4.5 years now s/p DDRT to LLQ on 7/7/18 by Dr. Oswaldo Castellon at [facility] in [place], [place]. Pt is a [facility] resident but was on [place] transplant waiting list. Pt's post op course was complicated by wound dehiscence on August 1, 2012 and he is s/p wound vac placement. Pt follows up at [facility] 1x a week for vac change, and a home visiting nurse comes to change the vac 2x a week (total 3x week vac change, last changed AM of 10/10/18).     Pt presented to [facility] on the morning of 10/10/18 after he experienced dark hematuria ("color of red wine") and a fever at home. He called his nephrologist, Dr. Maciel, who instructed him to go to the ED. At [facility] he had a CT which demonstrated "left pelvic renal transplant with severe diffuse perinephric edema and perinephric pelvic fluid collection concerning for abscess secondary to a severe pyelonephritis. Left anterior abdominal wall rectus muscle abscess with air with a sinus track to left anterior abdominal wall." Pt was bolused 3700ml NS, and was given 1000mg Vancomycin and 2000mg Cefepime after blood and urine cultures were sent. He was hyperglycemic and given 10 units Lantus and 10 units SC human insulin. Pt was then transferred to [facility] for further management. In the SICU at [facility], pt arrived hemodynamically stable. Antibiotics continued. Additional 10 units Lantus given and pt started on insulin infusion.

## 2018-10-11 NOTE — PHYSICAL THERAPY INITIAL EVALUATION ADULT - PERTINENT HX OF CURRENT PROBLEM, REHAB EVAL
66 yo M s/p DDRT to LLQ on 7/7/18 by Dr. Oswaldo Castellon at [facility] in [place], [place]. Pt is a [facility] resident but was on [place] transplant waiting list. Pt's post op course was complicated by wound dehiscence on August 1, 2012 and he is s/p wound vac placement. Pt follows up at [facility] 1x a week for vac change, and a home visiting nurse comes to change the vac 2x a week (total 3x week vac change, last changed AM of 10/10/18).  Cont'd...

## 2018-10-11 NOTE — PROGRESS NOTE ADULT - PROBLEM SELECTOR PLAN 1
DDRT to [facility] on 7/7/18  Good graft function Cr. at baseline  c/w IVF  wound vac to site  Daily CBC, BMP, Mg/ Phos coags  Lovenox DVT prophylaxis   Protonix GI prophylaxis  c/w home flomax and finesteride   cpap at night   Bowel regimen.  Monitor strict I&O  Encourage ambulation

## 2018-10-11 NOTE — CONSULT NOTE ADULT - ASSESSMENT
67 year old male with PMH of DM, HTN, HLD, A. fib on Eliquis, CAD s/p 2 vessel CABG in 2009, SALVADOR on CPAP, ESRD (2/2 DM) previously on HD via LUE AVF for 4.5 years now s/p DDRT to LLQ on 7/7/18 by Dr. Oswaldo Castellon at [facility] in [place], [place]. Pt is a [facility] resident but was on [place] transplant waiting list. Pt's post op course was complicated by wound dehiscence on August 1, 2012 and he is s/p wound vac placement. Came in with Fever and Hematuria, found to have collection around the transplanted kidney with suspicion of Abscess and Pyelonephritis.
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
## 2018-10-11 NOTE — PROGRESS NOTE ADULT - PROBLEM SELECTOR PLAN 2
Restart  Tacrolimus 1.5 mg BID today  c/w prednisone, bactrim and valcyte   Hold cellcept in the setting of infection  F/U Tacrolimus level daily, tacro goal 8-10

## 2018-10-12 DIAGNOSIS — G47.33 OBSTRUCTIVE SLEEP APNEA (ADULT) (PEDIATRIC): ICD-10-CM

## 2018-10-12 LAB
ANION GAP SERPL CALC-SCNC: 7 MMOL/L — SIGNIFICANT CHANGE UP (ref 5–17)
APTT BLD: 31.3 SEC — SIGNIFICANT CHANGE UP (ref 27.5–37.4)
BUN SERPL-MCNC: 37 MG/DL — HIGH (ref 7–23)
CA-I BLD-SCNC: 1.33 MMOL/L — HIGH (ref 1.12–1.3)
CALCIUM SERPL-MCNC: 9.2 MG/DL — SIGNIFICANT CHANGE UP (ref 8.4–10.5)
CHLORIDE SERPL-SCNC: 111 MMOL/L — HIGH (ref 96–108)
CO2 SERPL-SCNC: 18 MMOL/L — LOW (ref 22–31)
CREAT SERPL-MCNC: 1.3 MG/DL — SIGNIFICANT CHANGE UP (ref 0.5–1.3)
GLUCOSE BLDC GLUCOMTR-MCNC: 172 MG/DL — HIGH (ref 70–99)
GLUCOSE BLDC GLUCOMTR-MCNC: 176 MG/DL — HIGH (ref 70–99)
GLUCOSE BLDC GLUCOMTR-MCNC: 228 MG/DL — HIGH (ref 70–99)
GLUCOSE BLDC GLUCOMTR-MCNC: 239 MG/DL — HIGH (ref 70–99)
GLUCOSE BLDC GLUCOMTR-MCNC: 322 MG/DL — HIGH (ref 70–99)
GLUCOSE SERPL-MCNC: 203 MG/DL — HIGH (ref 70–99)
GRAM STN FLD: SIGNIFICANT CHANGE UP
HCT VFR BLD CALC: 36 % — LOW (ref 39–50)
HCT VFR BLD CALC: 40.3 % — SIGNIFICANT CHANGE UP (ref 39–50)
HGB BLD-MCNC: 11.3 G/DL — LOW (ref 13–17)
HGB BLD-MCNC: 12 G/DL — LOW (ref 13–17)
INR BLD: 1.49 RATIO — HIGH (ref 0.88–1.16)
MAGNESIUM SERPL-MCNC: 2.1 MG/DL — SIGNIFICANT CHANGE UP (ref 1.6–2.6)
MCHC RBC-ENTMCNC: 29.3 PG — SIGNIFICANT CHANGE UP (ref 27–34)
MCHC RBC-ENTMCNC: 29.9 GM/DL — LOW (ref 32–36)
MCHC RBC-ENTMCNC: 31 PG — SIGNIFICANT CHANGE UP (ref 27–34)
MCHC RBC-ENTMCNC: 31.5 GM/DL — LOW (ref 32–36)
MCV RBC AUTO: 98.2 FL — SIGNIFICANT CHANGE UP (ref 80–100)
MCV RBC AUTO: 98.5 FL — SIGNIFICANT CHANGE UP (ref 80–100)
PHOSPHATE SERPL-MCNC: 2.7 MG/DL — SIGNIFICANT CHANGE UP (ref 2.5–4.5)
PLATELET # BLD AUTO: 139 K/UL — LOW (ref 150–400)
PLATELET # BLD AUTO: 141 K/UL — LOW (ref 150–400)
POTASSIUM SERPL-MCNC: 4.7 MMOL/L — SIGNIFICANT CHANGE UP (ref 3.5–5.3)
POTASSIUM SERPL-SCNC: 4.7 MMOL/L — SIGNIFICANT CHANGE UP (ref 3.5–5.3)
PROTHROM AB SERPL-ACNC: 16.2 SEC — HIGH (ref 9.8–12.7)
RBC # BLD: 3.65 M/UL — LOW (ref 4.2–5.8)
RBC # BLD: 4.11 M/UL — LOW (ref 4.2–5.8)
RBC # FLD: 14.4 % — SIGNIFICANT CHANGE UP (ref 10.3–14.5)
RBC # FLD: 14.6 % — HIGH (ref 10.3–14.5)
SODIUM SERPL-SCNC: 136 MMOL/L — SIGNIFICANT CHANGE UP (ref 135–145)
SPECIMEN SOURCE: SIGNIFICANT CHANGE UP
TACROLIMUS SERPL-MCNC: 7.4 NG/ML — SIGNIFICANT CHANGE UP
WBC # BLD: 5.7 K/UL — SIGNIFICANT CHANGE UP (ref 3.8–10.5)
WBC # BLD: 6.3 K/UL — SIGNIFICANT CHANGE UP (ref 3.8–10.5)
WBC # FLD AUTO: 5.7 K/UL — SIGNIFICANT CHANGE UP (ref 3.8–10.5)
WBC # FLD AUTO: 6.3 K/UL — SIGNIFICANT CHANGE UP (ref 3.8–10.5)

## 2018-10-12 PROCEDURE — 99232 SBSQ HOSP IP/OBS MODERATE 35: CPT | Mod: GC

## 2018-10-12 PROCEDURE — 99222 1ST HOSP IP/OBS MODERATE 55: CPT

## 2018-10-12 PROCEDURE — 71045 X-RAY EXAM CHEST 1 VIEW: CPT | Mod: 26

## 2018-10-12 RX ORDER — APIXABAN 2.5 MG/1
5 TABLET, FILM COATED ORAL EVERY 12 HOURS
Qty: 0 | Refills: 0 | Status: DISCONTINUED | OUTPATIENT
Start: 2018-10-12 | End: 2018-10-16

## 2018-10-12 RX ORDER — ASPIRIN/CALCIUM CARB/MAGNESIUM 324 MG
81 TABLET ORAL DAILY
Qty: 0 | Refills: 0 | Status: DISCONTINUED | OUTPATIENT
Start: 2018-10-12 | End: 2018-10-16

## 2018-10-12 RX ORDER — MYCOPHENOLATE MOFETIL 250 MG/1
750 CAPSULE ORAL
Qty: 0 | Refills: 0 | Status: DISCONTINUED | OUTPATIENT
Start: 2018-10-12 | End: 2018-10-14

## 2018-10-12 RX ORDER — VANCOMYCIN HCL 1 G
1000 VIAL (EA) INTRAVENOUS EVERY 8 HOURS
Qty: 0 | Refills: 0 | Status: DISCONTINUED | OUTPATIENT
Start: 2018-10-12 | End: 2018-10-13

## 2018-10-12 RX ORDER — INSULIN LISPRO 100/ML
VIAL (ML) SUBCUTANEOUS
Qty: 0 | Refills: 0 | Status: DISCONTINUED | OUTPATIENT
Start: 2018-10-12 | End: 2018-10-13

## 2018-10-12 RX ORDER — CEFEPIME 1 G/1
2000 INJECTION, POWDER, FOR SOLUTION INTRAMUSCULAR; INTRAVENOUS EVERY 12 HOURS
Qty: 0 | Refills: 0 | Status: DISCONTINUED | OUTPATIENT
Start: 2018-10-12 | End: 2018-10-12

## 2018-10-12 RX ORDER — FUROSEMIDE 40 MG
40 TABLET ORAL DAILY
Qty: 0 | Refills: 0 | Status: DISCONTINUED | OUTPATIENT
Start: 2018-10-12 | End: 2018-10-15

## 2018-10-12 RX ORDER — INSULIN LISPRO 100/ML
VIAL (ML) SUBCUTANEOUS AT BEDTIME
Qty: 0 | Refills: 0 | Status: DISCONTINUED | OUTPATIENT
Start: 2018-10-12 | End: 2018-10-13

## 2018-10-12 RX ORDER — AMLODIPINE BESYLATE 2.5 MG/1
2.5 TABLET ORAL
Qty: 0 | Refills: 0 | Status: DISCONTINUED | OUTPATIENT
Start: 2018-10-12 | End: 2018-10-15

## 2018-10-12 RX ORDER — CEFEPIME 1 G/1
2000 INJECTION, POWDER, FOR SOLUTION INTRAMUSCULAR; INTRAVENOUS EVERY 12 HOURS
Qty: 0 | Refills: 0 | Status: DISCONTINUED | OUTPATIENT
Start: 2018-10-12 | End: 2018-10-16

## 2018-10-12 RX ADMIN — TACROLIMUS 1.5 MILLIGRAM(S): 5 CAPSULE ORAL at 08:25

## 2018-10-12 RX ADMIN — Medication 5 MILLIGRAM(S): at 05:57

## 2018-10-12 RX ADMIN — CEFEPIME 100 MILLIGRAM(S): 1 INJECTION, POWDER, FOR SOLUTION INTRAMUSCULAR; INTRAVENOUS at 18:03

## 2018-10-12 RX ADMIN — INSULIN GLARGINE 20 UNIT(S): 100 INJECTION, SOLUTION SUBCUTANEOUS at 22:08

## 2018-10-12 RX ADMIN — Medication 250 MILLIGRAM(S): at 02:23

## 2018-10-12 RX ADMIN — VALGANCICLOVIR 450 MILLIGRAM(S): 450 TABLET, FILM COATED ORAL at 11:31

## 2018-10-12 RX ADMIN — CHLORHEXIDINE GLUCONATE 1 APPLICATION(S): 213 SOLUTION TOPICAL at 05:59

## 2018-10-12 RX ADMIN — SODIUM CHLORIDE 50 MILLILITER(S): 9 INJECTION INTRAMUSCULAR; INTRAVENOUS; SUBCUTANEOUS at 00:43

## 2018-10-12 RX ADMIN — ATORVASTATIN CALCIUM 20 MILLIGRAM(S): 80 TABLET, FILM COATED ORAL at 22:01

## 2018-10-12 RX ADMIN — TAMSULOSIN HYDROCHLORIDE 0.4 MILLIGRAM(S): 0.4 CAPSULE ORAL at 22:00

## 2018-10-12 RX ADMIN — Medication 250 MILLIGRAM(S): at 22:00

## 2018-10-12 RX ADMIN — Medication 40 MILLIGRAM(S): at 09:58

## 2018-10-12 RX ADMIN — Medication 250 MILLIGRAM(S): at 13:49

## 2018-10-12 RX ADMIN — Medication 1 TABLET(S): at 11:31

## 2018-10-12 RX ADMIN — APIXABAN 5 MILLIGRAM(S): 2.5 TABLET, FILM COATED ORAL at 18:03

## 2018-10-12 RX ADMIN — AMLODIPINE BESYLATE 2.5 MILLIGRAM(S): 2.5 TABLET ORAL at 20:02

## 2018-10-12 RX ADMIN — Medication 81 MILLIGRAM(S): at 18:03

## 2018-10-12 RX ADMIN — Medication 4: at 12:52

## 2018-10-12 RX ADMIN — Medication 100 MILLIGRAM(S): at 13:48

## 2018-10-12 RX ADMIN — FINASTERIDE 5 MILLIGRAM(S): 5 TABLET, FILM COATED ORAL at 11:31

## 2018-10-12 RX ADMIN — MYCOPHENOLATE MOFETIL 750 MILLIGRAM(S): 250 CAPSULE ORAL at 18:03

## 2018-10-12 RX ADMIN — Medication 2: at 02:24

## 2018-10-12 RX ADMIN — Medication 8: at 18:43

## 2018-10-12 RX ADMIN — Medication 2: at 05:58

## 2018-10-12 RX ADMIN — CEFEPIME 100 MILLIGRAM(S): 1 INJECTION, POWDER, FOR SOLUTION INTRAMUSCULAR; INTRAVENOUS at 05:57

## 2018-10-12 RX ADMIN — PANTOPRAZOLE SODIUM 40 MILLIGRAM(S): 20 TABLET, DELAYED RELEASE ORAL at 08:24

## 2018-10-12 RX ADMIN — ISOSORBIDE MONONITRATE 60 MILLIGRAM(S): 60 TABLET, EXTENDED RELEASE ORAL at 11:32

## 2018-10-12 RX ADMIN — Medication 100 MILLIGRAM(S): at 22:01

## 2018-10-12 RX ADMIN — Medication 100 MILLIGRAM(S): at 05:57

## 2018-10-12 RX ADMIN — AMLODIPINE BESYLATE 2.5 MILLIGRAM(S): 2.5 TABLET ORAL at 10:39

## 2018-10-12 RX ADMIN — TACROLIMUS 1.5 MILLIGRAM(S): 5 CAPSULE ORAL at 20:02

## 2018-10-12 NOTE — PROGRESS NOTE ADULT - SUBJECTIVE AND OBJECTIVE BOX
HISTORY  67y Male Patient is a 67M Hx CABG ('09 2 vessel one of which was LAD), SALVADOR on CPA, ESRD (2/2 DM) s/p DDRT in July 2018 c/b wound infection ~1 month prior s/p debridement and wound vac who presented to the ED due to  hematuria, vomiting, and fevers. He developed hematuria this morning when he was passing stool. Later in the day around lunch, he developed vomiting and fevers which brought him to the hospital.      24 HOUR EVENTS: Patient went down to IR for drainage of perinephric collection during the day. No events overnight.     SUBJECTIVE/ROS:  [ ] A ten-point review of systems was otherwise negative except as noted.  [ ] Due to altered mental status/intubation, subjective information were not able to be obtained from the patient. History was obtained, to the extent possible, from review of the chart and collateral sources of information.      NEURO  Exam: awake, alert, oriented  Meds: none   [x] Adequacy of sedation and pain control has been assessed and adjusted      RESPIRATORY  RR: 23 (10-12-18 @ 02:00) (0 - 37)  SpO2: 97% (10-12-18 @ 02:00) (91% - 100%)  Exam: unlabored, clear to auscultation bilaterally  Mechanical Ventilation: none   [N/A] Extubation Readiness Assessed  Meds: none       CARDIOVASCULAR  HR: 56 (10-12-18 @ 02:00) (55 - 112)  BP: 149/69 (10-12-18 @ 02:00) (123/69 - 179/83)  BP(mean): 99 (10-12-18 @ 02:00) (80 - 126)  VBG - ( 11 Oct 2018 00:52 )  pH: 7.36  /  pCO2: 37    /  pO2: 46    / HCO3: 20    / Base Excess: -4.0  /  SaO2: 79     Lactate: 2.0    Exam: regular rate and rhythm  Cardiac Rhythm: sinus  Perfusion     [x]Adequate   [ ]Inadequate  Mentation   [x]Normal       [ ]Reduced  Extremities  [x]Warm         [ ]Cool  Volume Status [ ]Hypervolemic [x]Euvolemic [ ]Hypovolemic  Meds: isosorbide   mononitrate ER Tablet (IMDUR) 60 milliGRAM(s) Oral daily  tamsulosin 0.4 milliGRAM(s) Oral at bedtime        GI/NUTRITION  Exam: soft, nontender, nondistended  Diet: Regular diet   Meds: docusate sodium 100 milliGRAM(s) Oral three times a day  pantoprazole    Tablet 40 milliGRAM(s) Oral before breakfast  senna 2 Tablet(s) Oral at bedtime      GENITOURINARY  I&O's Detail    10-10 @ 07:01  -  10-11 @ 07:00  --------------------------------------------------------  IN:    insulin Infusion: 21.5 mL    IV PiggyBack: 325 mL    sodium chloride 0.9%.: 600 mL  Total IN: 946.5 mL    OUT:    Voided: 350 mL  Total OUT: 350 mL    Total NET: 596.5 mL      10-11 @ 07:01  -  10-12 @ 02:36  --------------------------------------------------------  IN:    IV PiggyBack: 675 mL    sodium chloride 0.9%.: 1400 mL  Total IN: 2075 mL    OUT:    Voided: 1150 mL  Total OUT: 1150 mL    Total NET: 925 mL          10-11    136  |  107  |  36<H>  ----------------------------<  323<H>  4.5   |  15<L>  |  1.09    Ca    9.5      11 Oct 2018 00:56  Phos  2.5     10-11  Mg     1.8     10-11    TPro  6.8  /  Alb  4.1  /  TBili  0.9  /  DBili  x   /  AST  38  /  ALT  24  /  AlkPhos  115  10-10    [ ] Trimble catheter, indication: N/A  Meds: multivitamin 1 Tablet(s) Oral daily  sodium chloride 0.9%. 1000 milliLiter(s) IV Continuous <Continuous>        HEMATOLOGIC  Meds: enoxaparin Injectable 40 milliGRAM(s) SubCutaneous daily    [x] VTE Prophylaxis                        13.4   8.8   )-----------( 156      ( 11 Oct 2018 00:56 )             41.8     PT/INR - ( 11 Oct 2018 00:56 )   PT: 16.7 sec;   INR: 1.52 ratio         PTT - ( 11 Oct 2018 00:56 )  PTT:34.1 sec  Transfusion     [ ] PRBC   [ ] Platelets   [ ] FFP   [ ] Cryoprecipitate      INFECTIOUS DISEASES    RECENT CULTURES:  Specimen Source: .Body Fluid Abdominal Fluid  Date/Time: 10-11 @ 22:33  Culture Results: --  Gram Stain:   polymorphonuclear leukocytes seen  Gram Variable Rods seen  by cytocentrifuge  Organism: --  Specimen Source: .Urine Clean Catch (Midstream)  Date/Time: 10-10 @ 19:28  Culture Results:   No growth  Gram Stain: --  Organism: --    Meds: cefepime   IVPB 2000 milliGRAM(s) IV Intermittent every 12 hours  influenza   Vaccine 0.5 milliLiter(s) IntraMuscular once  tacrolimus 1.5 milliGRAM(s) Oral two times a day  trimethoprim   80 mG/sulfamethoxazole 400 mG 1 Tablet(s) Oral daily  valGANciclovir 450 milliGRAM(s) Oral daily  vancomycin  IVPB 1000 milliGRAM(s) IV Intermittent every 8 hours        ENDOCRINE  CAPILLARY BLOOD GLUCOSE      POCT Blood Glucose.: 176 mg/dL (12 Oct 2018 02:13)  POCT Blood Glucose.: 215 mg/dL (11 Oct 2018 21:14)  POCT Blood Glucose.: 120 mg/dL (11 Oct 2018 17:25)  POCT Blood Glucose.: 125 mg/dL (11 Oct 2018 14:05)  POCT Blood Glucose.: 122 mg/dL (11 Oct 2018 10:19)  POCT Blood Glucose.: 88 mg/dL (11 Oct 2018 08:09)  POCT Blood Glucose.: 121 mg/dL (11 Oct 2018 06:51)  POCT Blood Glucose.: 153 mg/dL (11 Oct 2018 06:06)  POCT Blood Glucose.: 219 mg/dL (11 Oct 2018 05:04)  POCT Blood Glucose.: 261 mg/dL (11 Oct 2018 03:49)    Meds: atorvastatin 20 milliGRAM(s) Oral at bedtime  finasteride 5 milliGRAM(s) Oral daily  insulin glargine Injectable (LANTUS) 20 Unit(s) SubCutaneous at bedtime  insulin lispro (HumaLOG) corrective regimen sliding scale   SubCutaneous every 4 hours  predniSONE   Tablet 5 milliGRAM(s) Oral daily        ACCESS DEVICES:  [x] Peripheral IV  [ ] Central Venous Line	[ ] R	[ ] L	[ ] IJ	[ ] Fem	[ ] SC	Placed:   [ ] Arterial Line		[ ] R	[ ] L	[ ] Fem	[ ] Rad	[ ] Ax	Placed:   [ ] PICC:					[ ] Mediport  [ ] Urinary Catheter, Date Placed:   [x] Necessity of urinary, arterial, and venous catheters discussed    OTHER MEDICATIONS:  chlorhexidine 4% Liquid 1 Application(s) Topical <User Schedule>      CODE STATUS: full code       IMAGING: < from: CT Abdomen and Pelvis No Cont (10.10.18 @ 18:24) >  IMPRESSION:     LEFT pelvic renal transplant shows severe diffuse perinephric edema with   the LEFT perinephric pelvic fluid collection concerning for abscess   secondary to a severe pyelonephritis .  No hydronephrosis or stone disease seen .  No bladder outlet obstruction.  LEFT anterior abdominal wall rectus muscle abscess with air with a sinus   track LEFT anterior abdominal wall.      < end of copied text > HISTORY  67y Male Patient is a 67M Hx CABG ('09 2 vessel one of which was LAD), SALVADOR on CPA, ESRD (2/2 DM) s/p DDRT in July 2018 c/b wound infection ~1 month prior s/p debridement and wound vac who presented to the ED due to  hematuria, vomiting, and fevers. He developed hematuria this morning when he was passing stool. Later in the day around lunch, he developed vomiting and fevers which brought him to the hospital.      24 HOUR EVENTS: Patient went down to IR for drainage of perinephric collection during the day. No events overnight.     SUBJECTIVE/ROS:  [ ] A ten-point review of systems was otherwise negative except as noted.  [ ] Due to altered mental status/intubation, subjective information were not able to be obtained from the patient. History was obtained, to the extent possible, from review of the chart and collateral sources of information.      NEURO  Exam: awake, alert, oriented  Meds: none   [x] Adequacy of sedation and pain control has been assessed and adjusted      RESPIRATORY  RR: 23 (10-12-18 @ 02:00) (0 - 37)  SpO2: 97% (10-12-18 @ 02:00) (91% - 100%)  Exam: unlabored, clear to auscultation bilaterally  Mechanical Ventilation: none   [N/A] Extubation Readiness Assessed  Meds: none       CARDIOVASCULAR  HR: 56 (10-12-18 @ 02:00) (55 - 112)  BP: 149/69 (10-12-18 @ 02:00) (123/69 - 179/83)  BP(mean): 99 (10-12-18 @ 02:00) (80 - 126)  VBG - ( 11 Oct 2018 00:52 )  pH: 7.36  /  pCO2: 37    /  pO2: 46    / HCO3: 20    / Base Excess: -4.0  /  SaO2: 79     Lactate: 2.0    Exam: regular rate and rhythm  Cardiac Rhythm: sinus  Perfusion     [x]Adequate   [ ]Inadequate  Mentation   [x]Normal       [ ]Reduced  Extremities  [x]Warm         [ ]Cool  Volume Status [ ]Hypervolemic [x]Euvolemic [ ]Hypovolemic  Meds: isosorbide   mononitrate ER Tablet (IMDUR) 60 milliGRAM(s) Oral daily  tamsulosin 0.4 milliGRAM(s) Oral at bedtime        GI/NUTRITION  Exam: soft, nontender, nondistended  Diet: Regular diet   Meds: docusate sodium 100 milliGRAM(s) Oral three times a day  pantoprazole    Tablet 40 milliGRAM(s) Oral before breakfast  senna 2 Tablet(s) Oral at bedtime      GENITOURINARY  I&O's Detail    10-10 @ 07:01  -  10-11 @ 07:00  --------------------------------------------------------  IN:    insulin Infusion: 21.5 mL    IV PiggyBack: 325 mL    sodium chloride 0.9%.: 600 mL  Total IN: 946.5 mL    OUT:    Voided: 350 mL  Total OUT: 350 mL    Total NET: 596.5 mL      10-11 @ 07:01  -  10-12 @ 02:36  --------------------------------------------------------  IN:    IV PiggyBack: 675 mL    sodium chloride 0.9%.: 1400 mL  Total IN: 2075 mL    OUT:    Voided: 1150 mL  Total OUT: 1150 mL    Total NET: 925 mL                      11.3                 136  | 18   | 37           5.7   >-----------< 141     ------------------------< 203                   36.0                 4.7  | 111  | 1.30                                         Ca 9.2   Mg 2.1   Ph 2.7        [ ] Trimble catheter, indication: N/A  Meds: multivitamin 1 Tablet(s) Oral daily  sodium chloride 0.9%. 1000 milliLiter(s) IV Continuous <Continuous>        HEMATOLOGIC  Meds: enoxaparin Injectable 40 milliGRAM(s) SubCutaneous daily    [x] VTE Prophylaxis             PT/INR -  16.2 sec / 1.49 ratio   ( 12 Oct 2018 02:39 )       PTT -  31.3 sec   ( 12 Oct 2018 02:39 )  Transfusion     [ ] PRBC   [ ] Platelets   [ ] FFP   [ ] Cryoprecipitate      INFECTIOUS DISEASES    RECENT CULTURES:  Specimen Source: .Body Fluid Abdominal Fluid  Date/Time: 10-11 @ 22:33  Culture Results: --  Gram Stain:   polymorphonuclear leukocytes seen  Gram Variable Rods seen  by cytocentrifuge  Organism: --  Specimen Source: .Urine Clean Catch (Midstream)  Date/Time: 10-10 @ 19:28  Culture Results:   No growth  Gram Stain: --  Organism: --    Meds: cefepime   IVPB 2000 milliGRAM(s) IV Intermittent every 12 hours  influenza   Vaccine 0.5 milliLiter(s) IntraMuscular once  tacrolimus 1.5 milliGRAM(s) Oral two times a day  trimethoprim   80 mG/sulfamethoxazole 400 mG 1 Tablet(s) Oral daily  valGANciclovir 450 milliGRAM(s) Oral daily  vancomycin  IVPB 1000 milliGRAM(s) IV Intermittent every 8 hours        ENDOCRINE  CAPILLARY BLOOD GLUCOSE      POCT Blood Glucose.: 176 mg/dL (12 Oct 2018 02:13)  POCT Blood Glucose.: 215 mg/dL (11 Oct 2018 21:14)  POCT Blood Glucose.: 120 mg/dL (11 Oct 2018 17:25)  POCT Blood Glucose.: 125 mg/dL (11 Oct 2018 14:05)  POCT Blood Glucose.: 122 mg/dL (11 Oct 2018 10:19)  POCT Blood Glucose.: 88 mg/dL (11 Oct 2018 08:09)  POCT Blood Glucose.: 121 mg/dL (11 Oct 2018 06:51)  POCT Blood Glucose.: 153 mg/dL (11 Oct 2018 06:06)  POCT Blood Glucose.: 219 mg/dL (11 Oct 2018 05:04)  POCT Blood Glucose.: 261 mg/dL (11 Oct 2018 03:49)    Meds: atorvastatin 20 milliGRAM(s) Oral at bedtime  finasteride 5 milliGRAM(s) Oral daily  insulin glargine Injectable (LANTUS) 20 Unit(s) SubCutaneous at bedtime  insulin lispro (HumaLOG) corrective regimen sliding scale   SubCutaneous every 4 hours  predniSONE   Tablet 5 milliGRAM(s) Oral daily        ACCESS DEVICES:  [x] Peripheral IV  [ ] Central Venous Line	[ ] R	[ ] L	[ ] IJ	[ ] Fem	[ ] SC	Placed:   [ ] Arterial Line		[ ] R	[ ] L	[ ] Fem	[ ] Rad	[ ] Ax	Placed:   [ ] PICC:					[ ] Mediport  [ ] Urinary Catheter, Date Placed:   [x] Necessity of urinary, arterial, and venous catheters discussed    OTHER MEDICATIONS:  chlorhexidine 4% Liquid 1 Application(s) Topical <User Schedule>      CODE STATUS: full code       IMAGING: < from: CT Abdomen and Pelvis No Cont (10.10.18 @ 18:24) >  IMPRESSION:     LEFT pelvic renal transplant shows severe diffuse perinephric edema with   the LEFT perinephric pelvic fluid collection concerning for abscess   secondary to a severe pyelonephritis .  No hydronephrosis or stone disease seen .  No bladder outlet obstruction.  LEFT anterior abdominal wall rectus muscle abscess with air with a sinus   track LEFT anterior abdominal wall.      < end of copied text >

## 2018-10-12 NOTE — PROGRESS NOTE ADULT - PROBLEM SELECTOR PLAN 3
Tacrolimus 1.5 mg BID   c/w prednisone, bactrim and valcyte   Restart cellcept 750 mg BID this evening  F/U Tacrolimus level daily, tacro goal 8-10.

## 2018-10-12 NOTE — PROGRESS NOTE ADULT - PROBLEM SELECTOR PLAN 3
On Cefepime and Vanc  Urine Cx: no growth  Dose meds as per GFR  IR aspirated the collected fluid 7cc which showed Polymorphonuclear cells and gm variable rods.  Send blood cx   ID consult

## 2018-10-12 NOTE — PROGRESS NOTE ADULT - ASSESSMENT
67 year old male with recent h/o renal transplant 7/7/18 c/b wound dehiscence and wound vac placement 8/1/18 now presents with UTI vs perinephric abscess vs pyelonephritis of transplant kidney    PLAN:   Neurologic: no acute issues  - Tylenol as needed for pain    Respiratory: SALVADOR on CPAP  - Nocturnal CPAP 14  - Incentive spirometry, OOB to prevent atelectasis    Cardiovascular: CAD s/p CABG; HLD; HTN  - Continue home blood pressure medications    Gastrointestinal/Nutrition: no acute issues  - Continue regular diet     Genitourinary/Renal: s/p renal transplant; hematuria 2/2 UTI vs perinephric abscess vs pyelonephritis of transplant kidney  - Monitor I&Os  - NS @ 50ml/hr  - Hold home Lasix  - Flomax & finasteride for BPH    Hematologic: no acute issues  - Lovenox for VTE prophylaxis    Infectious Disease: h/o renal transplant; UTI vs perinephric abscess vs pyelonephritis of transplant kidney  - Continue broad spectrum antibiotics with vancomycin, cefepime  - Follow up blood and urine cultures  - Prednisone 5mg qd for h/o transplant as well as tacrolimus   - Valcyte & Bactrim prophylaxis    Endocrine: DM  - 20 units lantus at bedtime   Disposition: SICU.       - Pablo Chairez PA-C

## 2018-10-12 NOTE — PROGRESS NOTE ADULT - PROBLEM SELECTOR PLAN 2
DDRT to [facility] on 7/7/18  Good graft function Cr. at baseline  D/C IVF  Daily CBC, BMP, Mg/ Phos coags  Protonix GI prophylaxis  Bowel regimen.  c/w home flomax and finesteride   Monitor strict I&O  Encourage ambulation.

## 2018-10-12 NOTE — PROGRESS NOTE ADULT - ASSESSMENT
67 year old male with PMH of DM, HTN, HLD, A. fib on Eliquis, CAD s/p 2 vessel CABG in 2009, SALVADOR on CPAP, ESRD (2/2 DM) previously on HD  s/p DDRT to LLQ on 7/7/18 by Dr. Oswaldo Castellon at [facility] in [place], [place].  Pt's post op course was complicated by wound dehiscence and wound vac placement on 8/1/18, Admitted to SICU w/ Fever and Hematuria, found to have collection around the transplanted kidney with suspicion of abscess and pyelonephritis. S/P IR drain only 7ml output.

## 2018-10-12 NOTE — PROGRESS NOTE ADULT - ASSESSMENT
67 year old male with PMH of DM, HTN, HLD, A. fib on Eliquis, CAD s/p 2 vessel CABG in 2009, SALVADOR on CPAP, ESRD (2/2 DM) previously on HD via LUE AVF for 4.5 years now s/p DDRT to LLQ on 7/7/18 by Dr. Oswaldo Castellon at [facility] in [place], [place]. Pt is a [facility] resident but was on [place] transplant waiting list. Pt's post op course was complicated by wound dehiscence on August 1, 2012 and he is s/p wound vac placement. Came in with Fever and Hematuria, found to have collection around the transplanted kidney with suspicion of Abscess and Pyelonephritis.

## 2018-10-12 NOTE — PROVIDER CONTACT NOTE (OTHER) - SITUATION
abdominal fluid culture results called in by Melody Kraus from lab  Polymorphonuclear leukocytes seen & Gram variable rods seen by cytocentrifuge

## 2018-10-12 NOTE — PROGRESS NOTE ADULT - PROBLEM SELECTOR PLAN 1
Wound vac to LLQ change 3xweek  Repeat blood cultures x 2 today  Continue vancomycin and cefepime  ID consult

## 2018-10-12 NOTE — PROGRESS NOTE ADULT - PROBLEM SELECTOR PLAN 2
Resume  mg BID  Continue Tacro 1.5 mg PO BID  Cont Prednisone 5 mg PO daily  Cont Bactrim and Valcyte  Monitor Tacro level 30 minutes before AM dose.
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
## 2018-10-12 NOTE — PROGRESS NOTE ADULT - SUBJECTIVE AND OBJECTIVE BOX
[facility] DIVISION OF KIDNEY DISEASES AND HYPERTENSION -- FOLLOW UP NOTE  --------------------------------------------------------------------------------  Chief Complaint: Fever and hematuria    24 hour events/subjective: Seen and examined at the bedside. c/o Dyspnoea on exertion. Afebrile. IR aspirated the collected fluid 7cc which showed Polymorphonuclear cells and gm variable rods.        PAST HISTORY  --------------------------------------------------------------------------------  No significant changes to PMH, PSH, FHx, SHx, unless otherwise noted    ALLERGIES & MEDICATIONS  --------------------------------------------------------------------------------  Allergies    No Known Allergies    Intolerances      Standing Inpatient Medications  amLODIPine   Tablet 2.5 milliGRAM(s) Oral <User Schedule>  apixaban 5 milliGRAM(s) Oral every 12 hours  aspirin enteric coated 81 milliGRAM(s) Oral daily  atorvastatin 20 milliGRAM(s) Oral at bedtime  cefepime   IVPB 2000 milliGRAM(s) IV Intermittent every 12 hours  chlorhexidine 4% Liquid 1 Application(s) Topical <User Schedule>  docusate sodium 100 milliGRAM(s) Oral three times a day  finasteride 5 milliGRAM(s) Oral daily  furosemide    Tablet 40 milliGRAM(s) Oral daily  influenza   Vaccine 0.5 milliLiter(s) IntraMuscular once  insulin glargine Injectable (LANTUS) 20 Unit(s) SubCutaneous at bedtime  insulin lispro (HumaLOG) corrective regimen sliding scale   SubCutaneous three times a day before meals  insulin lispro (HumaLOG) corrective regimen sliding scale   SubCutaneous at bedtime  isosorbide   mononitrate ER Tablet (IMDUR) 60 milliGRAM(s) Oral daily  multivitamin 1 Tablet(s) Oral daily  mycophenolate mofetil 750 milliGRAM(s) Oral two times a day  pantoprazole    Tablet 40 milliGRAM(s) Oral before breakfast  predniSONE   Tablet 5 milliGRAM(s) Oral daily  senna 2 Tablet(s) Oral at bedtime  tacrolimus 1.5 milliGRAM(s) Oral two times a day  tamsulosin 0.4 milliGRAM(s) Oral at bedtime  trimethoprim   80 mG/sulfamethoxazole 400 mG 1 Tablet(s) Oral daily  valGANciclovir 450 milliGRAM(s) Oral daily  vancomycin  IVPB 1000 milliGRAM(s) IV Intermittent every 8 hours    PRN Inpatient Medications      REVIEW OF SYSTEMS  --------------------------------------------------------------------------------  Gen: No fatigue, fevers/chills, weakness  Skin: No rashes  Head/Eyes/Ears/Mouth: No headache;No sore throat  Respiratory: Dyspnoea on exertion   CV: No chest pain, PND, orthopnea  GI: No abdominal pain, diarrhea, constipation, nausea, vomiting  Transplant: No pain  : No increased frequency, dysuria, hematuria, nocturia  MSK: No joint pain/swelling; no back pain; no edema  Neuro: No dizziness/lightheadedness, weakness, seizures, numbness, tingling  Psych: No significant nervousness, anxiety, stress, depression    All other systems were reviewed and are negative, except as noted.    VITALS/PHYSICAL EXAM  --------------------------------------------------------------------------------  T(C): 36.7 (10-12-18 @ 12:00), Max: 36.9 (10-11-18 @ 19:00)  HR: 91 (10-12-18 @ 14:00) (56 - 100)  BP: 148/77 (10-12-18 @ 14:00) (123/69 - 179/86)  RR: 20 (10-12-18 @ 14:00) (20 - 31)  SpO2: 97% (10-12-18 @ 14:00) (92% - 97%)  Wt(kg): --  Height (cm): 177.8 (10-11-18 @ 00:30)  Weight (kg): 126.7 (10-12-18 @ 06:00)  BMI (kg/m2): 40.1 (10-12-18 @ 06:00)  BSA (m2): 2.41 (10-12-18 @ 06:00)      10-11-18 @ 07:01  -  10-12-18 @ 07:00  --------------------------------------------------------  IN: 2695 mL / OUT: 1450 mL / NET: 1245 mL    10-12-18 @ 07:01  -  10-12-18 @ 14:27  --------------------------------------------------------  IN: 490 mL / OUT: 520 mL / NET: -30 mL      Physical Exam:  	Gen: NAD, well-appearing  	HEENT: PERRL, supple neck, clear oropharynx  	Pulm: CTA B/L  	CV: RRR, S1S2; no rub  	Back: No spinal or CVA tenderness; no sacral edema  	Abd: +BS, soft, nontender/nondistended                      Transplant: No tenderness, swelling  	: No suprapubic tenderness  	UE: Warm, FROM, intact strength; no edema; no asterixis  	LE: Warm, FROM, intact strength; Dorsalis pedis not palpable b/l, Ulcer on right great toe  	Neuro: No focal deficits, intact gait  	Psych: Normal affect and mood  	Skin: Warm, without rashes      LABS/STUDIES  --------------------------------------------------------------------------------              12.0   6.3   >-----------<  139      [10-12-18 @ 10:30]              40.3     136  |  111  |  37  ----------------------------<  203      [10-12-18 @ 02:39]  4.7   |  18  |  1.30        Ca     9.2     [10-12-18 @ 02:39]      iCa    1.33     [10-12 @ 02:38]      Mg     2.1     [10-12-18 @ 02:39]      Phos  2.7     [10-12-18 @ 02:39]    TPro  6.8  /  Alb  4.1  /  TBili  0.9  /  DBili  x   /  AST  38  /  ALT  24  /  AlkPhos  115  [10-10-18 @ 17:43]    PT/INR: PT 16.2 , INR 1.49       [10-12-18 @ 02:39]  PTT: 31.3       [10-12-18 @ 02:39]      Creatinine Trend:  SCr 1.30 [10-12 @ 02:39]  SCr 1.09 [10-11 @ 00:56]  SCr 1.18 [10-10 @ 17:43]    Tacrolimus (), Serum: 7.4 ng/mL (10-12 @ 10:32)          Urinalysis - [10-10-18 @ 19:28]      Color Yellow / Appearance Clear / SG 1.020 / pH 6.0      Gluc 1000 / Ketone Trace  / Bili Negative / Urobili Negative       Blood Moderate / Protein 100 / Leuk Est Small / Nitrite Negative      RBC 3-5 / WBC 11-25 / Hyaline  / Gran  / Sq Epi  / Non Sq Epi Negative / Bacteria Occasional

## 2018-10-12 NOTE — PROGRESS NOTE ADULT - PROBLEM SELECTOR PLAN 1
s/p DDRT to LLQ on 7/7/18  Allograft functioning well, post op period c/b wound dehiscence and collection, on wound vac  Scr stable  Monitor Mg and Phos everyday  Bowel regimen.

## 2018-10-12 NOTE — PROGRESS NOTE ADULT - SUBJECTIVE AND OBJECTIVE BOX
ANESTHESIA POSTOP CHECK    67y Male POSTOP DAY 1     Vital Signs Last 24 Hrs  T(C): 36.4 (12 Oct 2018 08:00), Max: 36.9 (11 Oct 2018 19:00)  T(F): 97.5 (12 Oct 2018 08:00), Max: 98.5 (11 Oct 2018 19:00)  HR: 90 (12 Oct 2018 09:00) (56 - 112)  BP: 154/81 (12 Oct 2018 09:00) (123/69 - 179/86)  BP(mean): 111 (12 Oct 2018 09:00) (85 - 126)  RR: 22 (12 Oct 2018 09:00) (22 - 37)  SpO2: 96% (12 Oct 2018 09:00) (91% - 97%)  I&O's Summary    11 Oct 2018 07:01  -  12 Oct 2018 07:00  --------------------------------------------------------  IN: 2695 mL / OUT: 1450 mL / NET: 1245 mL    12 Oct 2018 07:01  -  12 Oct 2018 10:44  --------------------------------------------------------  IN: 50 mL / OUT: 0 mL / NET: 50 mL        [x] NO APPARENT ANESTHESIA COMPLICATIONS

## 2018-10-13 LAB
ANION GAP SERPL CALC-SCNC: 9 MMOL/L — SIGNIFICANT CHANGE UP (ref 5–17)
APPEARANCE UR: CLEAR — SIGNIFICANT CHANGE UP
APTT BLD: 29.8 SEC — SIGNIFICANT CHANGE UP (ref 27.5–37.4)
BILIRUB UR-MCNC: NEGATIVE — SIGNIFICANT CHANGE UP
BUN SERPL-MCNC: 34 MG/DL — HIGH (ref 7–23)
CA-I BLD-SCNC: 1.35 MMOL/L — HIGH (ref 1.12–1.3)
CALCIUM SERPL-MCNC: 9.1 MG/DL — SIGNIFICANT CHANGE UP (ref 8.4–10.5)
CHLORIDE SERPL-SCNC: 111 MMOL/L — HIGH (ref 96–108)
CO2 SERPL-SCNC: 18 MMOL/L — LOW (ref 22–31)
COLOR SPEC: COLORLESS — SIGNIFICANT CHANGE UP
CREAT SERPL-MCNC: 1.3 MG/DL — SIGNIFICANT CHANGE UP (ref 0.5–1.3)
DIFF PNL FLD: ABNORMAL
GAS PNL BLDA: SIGNIFICANT CHANGE UP
GLUCOSE BLDC GLUCOMTR-MCNC: 243 MG/DL — HIGH (ref 70–99)
GLUCOSE BLDC GLUCOMTR-MCNC: 246 MG/DL — HIGH (ref 70–99)
GLUCOSE BLDC GLUCOMTR-MCNC: 270 MG/DL — HIGH (ref 70–99)
GLUCOSE BLDC GLUCOMTR-MCNC: 91 MG/DL — SIGNIFICANT CHANGE UP (ref 70–99)
GLUCOSE SERPL-MCNC: 257 MG/DL — HIGH (ref 70–99)
GLUCOSE UR QL: NEGATIVE — SIGNIFICANT CHANGE UP
HCT VFR BLD CALC: 34.5 % — LOW (ref 39–50)
HGB BLD-MCNC: 10.9 G/DL — LOW (ref 13–17)
INR BLD: 1.53 RATIO — HIGH (ref 0.88–1.16)
KETONES UR-MCNC: NEGATIVE — SIGNIFICANT CHANGE UP
LEUKOCYTE ESTERASE UR-ACNC: NEGATIVE — SIGNIFICANT CHANGE UP
MAGNESIUM SERPL-MCNC: 2 MG/DL — SIGNIFICANT CHANGE UP (ref 1.6–2.6)
MCHC RBC-ENTMCNC: 30.7 PG — SIGNIFICANT CHANGE UP (ref 27–34)
MCHC RBC-ENTMCNC: 31.5 GM/DL — LOW (ref 32–36)
MCV RBC AUTO: 97.4 FL — SIGNIFICANT CHANGE UP (ref 80–100)
NITRITE UR-MCNC: NEGATIVE — SIGNIFICANT CHANGE UP
PH UR: 6 — SIGNIFICANT CHANGE UP (ref 5–8)
PHOSPHATE SERPL-MCNC: 2.1 MG/DL — LOW (ref 2.5–4.5)
PLATELET # BLD AUTO: 137 K/UL — LOW (ref 150–400)
POTASSIUM SERPL-MCNC: 4.4 MMOL/L — SIGNIFICANT CHANGE UP (ref 3.5–5.3)
POTASSIUM SERPL-SCNC: 4.4 MMOL/L — SIGNIFICANT CHANGE UP (ref 3.5–5.3)
PROT UR-MCNC: NEGATIVE — SIGNIFICANT CHANGE UP
PROTHROM AB SERPL-ACNC: 16.7 SEC — HIGH (ref 9.8–12.7)
RBC # BLD: 3.54 M/UL — LOW (ref 4.2–5.8)
RBC # FLD: 14.7 % — HIGH (ref 10.3–14.5)
SODIUM SERPL-SCNC: 138 MMOL/L — SIGNIFICANT CHANGE UP (ref 135–145)
SP GR SPEC: 1.01 — LOW (ref 1.01–1.02)
TACROLIMUS SERPL-MCNC: 7 NG/ML — SIGNIFICANT CHANGE UP
UROBILINOGEN FLD QL: NEGATIVE — SIGNIFICANT CHANGE UP
VANCOMYCIN TROUGH SERPL-MCNC: 20.7 UG/ML — HIGH (ref 10–20)
WBC # BLD: 4.5 K/UL — SIGNIFICANT CHANGE UP (ref 3.8–10.5)
WBC # FLD AUTO: 4.5 K/UL — SIGNIFICANT CHANGE UP (ref 3.8–10.5)

## 2018-10-13 PROCEDURE — 99232 SBSQ HOSP IP/OBS MODERATE 35: CPT

## 2018-10-13 PROCEDURE — 71045 X-RAY EXAM CHEST 1 VIEW: CPT | Mod: 26

## 2018-10-13 RX ORDER — FUROSEMIDE 40 MG
20 TABLET ORAL ONCE
Qty: 0 | Refills: 0 | Status: DISCONTINUED | OUTPATIENT
Start: 2018-10-13 | End: 2018-10-13

## 2018-10-13 RX ORDER — INSULIN LISPRO 100/ML
VIAL (ML) SUBCUTANEOUS
Qty: 0 | Refills: 0 | Status: DISCONTINUED | OUTPATIENT
Start: 2018-10-13 | End: 2018-10-15

## 2018-10-13 RX ORDER — FUROSEMIDE 40 MG
40 TABLET ORAL ONCE
Qty: 0 | Refills: 0 | Status: COMPLETED | OUTPATIENT
Start: 2018-10-13 | End: 2018-10-13

## 2018-10-13 RX ORDER — INSULIN HUMAN 100 [IU]/ML
2 INJECTION, SOLUTION SUBCUTANEOUS
Qty: 100 | Refills: 0 | Status: DISCONTINUED | OUTPATIENT
Start: 2018-10-13 | End: 2018-10-13

## 2018-10-13 RX ORDER — DEXTROSE 50 % IN WATER 50 %
50 SYRINGE (ML) INTRAVENOUS
Qty: 0 | Refills: 0 | Status: DISCONTINUED | OUTPATIENT
Start: 2018-10-13 | End: 2018-10-13

## 2018-10-13 RX ORDER — VANCOMYCIN HCL 1 G
1000 VIAL (EA) INTRAVENOUS EVERY 12 HOURS
Qty: 0 | Refills: 0 | Status: DISCONTINUED | OUTPATIENT
Start: 2018-10-13 | End: 2018-10-15

## 2018-10-13 RX ORDER — INSULIN LISPRO 100/ML
VIAL (ML) SUBCUTANEOUS
Qty: 0 | Refills: 0 | Status: DISCONTINUED | OUTPATIENT
Start: 2018-10-13 | End: 2018-10-13

## 2018-10-13 RX ORDER — INSULIN LISPRO 100/ML
VIAL (ML) SUBCUTANEOUS AT BEDTIME
Qty: 0 | Refills: 0 | Status: DISCONTINUED | OUTPATIENT
Start: 2018-10-13 | End: 2018-10-13

## 2018-10-13 RX ORDER — HYDRALAZINE HCL 50 MG
10 TABLET ORAL ONCE
Qty: 0 | Refills: 0 | Status: COMPLETED | OUTPATIENT
Start: 2018-10-13 | End: 2018-10-13

## 2018-10-13 RX ORDER — INSULIN LISPRO 100/ML
VIAL (ML) SUBCUTANEOUS AT BEDTIME
Qty: 0 | Refills: 0 | Status: DISCONTINUED | OUTPATIENT
Start: 2018-10-13 | End: 2018-10-15

## 2018-10-13 RX ORDER — DEXTROSE 50 % IN WATER 50 %
25 SYRINGE (ML) INTRAVENOUS
Qty: 0 | Refills: 0 | Status: DISCONTINUED | OUTPATIENT
Start: 2018-10-13 | End: 2018-10-13

## 2018-10-13 RX ADMIN — APIXABAN 5 MILLIGRAM(S): 2.5 TABLET, FILM COATED ORAL at 06:54

## 2018-10-13 RX ADMIN — MYCOPHENOLATE MOFETIL 750 MILLIGRAM(S): 250 CAPSULE ORAL at 17:30

## 2018-10-13 RX ADMIN — Medication 10 MILLIGRAM(S): at 22:28

## 2018-10-13 RX ADMIN — ISOSORBIDE MONONITRATE 60 MILLIGRAM(S): 60 TABLET, EXTENDED RELEASE ORAL at 12:08

## 2018-10-13 RX ADMIN — CHLORHEXIDINE GLUCONATE 1 APPLICATION(S): 213 SOLUTION TOPICAL at 06:54

## 2018-10-13 RX ADMIN — INSULIN GLARGINE 20 UNIT(S): 100 INJECTION, SOLUTION SUBCUTANEOUS at 21:45

## 2018-10-13 RX ADMIN — Medication 100 MILLIGRAM(S): at 13:05

## 2018-10-13 RX ADMIN — AMLODIPINE BESYLATE 2.5 MILLIGRAM(S): 2.5 TABLET ORAL at 09:02

## 2018-10-13 RX ADMIN — TAMSULOSIN HYDROCHLORIDE 0.4 MILLIGRAM(S): 0.4 CAPSULE ORAL at 21:46

## 2018-10-13 RX ADMIN — ATORVASTATIN CALCIUM 20 MILLIGRAM(S): 80 TABLET, FILM COATED ORAL at 21:46

## 2018-10-13 RX ADMIN — APIXABAN 5 MILLIGRAM(S): 2.5 TABLET, FILM COATED ORAL at 17:30

## 2018-10-13 RX ADMIN — Medication 1 TABLET(S): at 12:09

## 2018-10-13 RX ADMIN — AMLODIPINE BESYLATE 2.5 MILLIGRAM(S): 2.5 TABLET ORAL at 20:14

## 2018-10-13 RX ADMIN — Medication 11: at 12:50

## 2018-10-13 RX ADMIN — CEFEPIME 100 MILLIGRAM(S): 1 INJECTION, POWDER, FOR SOLUTION INTRAMUSCULAR; INTRAVENOUS at 06:54

## 2018-10-13 RX ADMIN — Medication 100 MILLIGRAM(S): at 06:54

## 2018-10-13 RX ADMIN — MYCOPHENOLATE MOFETIL 750 MILLIGRAM(S): 250 CAPSULE ORAL at 06:54

## 2018-10-13 RX ADMIN — Medication 5 MILLIGRAM(S): at 06:55

## 2018-10-13 RX ADMIN — Medication 40 MILLIGRAM(S): at 09:46

## 2018-10-13 RX ADMIN — TACROLIMUS 1.5 MILLIGRAM(S): 5 CAPSULE ORAL at 07:49

## 2018-10-13 RX ADMIN — Medication 3: at 21:45

## 2018-10-13 RX ADMIN — Medication 81 MILLIGRAM(S): at 12:09

## 2018-10-13 RX ADMIN — FINASTERIDE 5 MILLIGRAM(S): 5 TABLET, FILM COATED ORAL at 12:08

## 2018-10-13 RX ADMIN — Medication 100 MILLIGRAM(S): at 21:46

## 2018-10-13 RX ADMIN — Medication 4: at 08:03

## 2018-10-13 RX ADMIN — TACROLIMUS 1.5 MILLIGRAM(S): 5 CAPSULE ORAL at 20:14

## 2018-10-13 RX ADMIN — PANTOPRAZOLE SODIUM 40 MILLIGRAM(S): 20 TABLET, DELAYED RELEASE ORAL at 07:49

## 2018-10-13 RX ADMIN — VALGANCICLOVIR 450 MILLIGRAM(S): 450 TABLET, FILM COATED ORAL at 12:09

## 2018-10-13 RX ADMIN — Medication 250 MILLIGRAM(S): at 06:49

## 2018-10-13 RX ADMIN — CEFEPIME 100 MILLIGRAM(S): 1 INJECTION, POWDER, FOR SOLUTION INTRAMUSCULAR; INTRAVENOUS at 17:28

## 2018-10-13 RX ADMIN — Medication 40 MILLIGRAM(S): at 06:54

## 2018-10-13 RX ADMIN — SENNA PLUS 2 TABLET(S): 8.6 TABLET ORAL at 21:46

## 2018-10-13 NOTE — PROGRESS NOTE ADULT - PROBLEM SELECTOR PLAN 3
On Cefepime and Vanc  Urine Cx: no growth  Dose meds as per GFR  IR aspirated the collected fluid 7cc which showed Polymorphonuclear cells and gm variable rods.  ID following

## 2018-10-13 NOTE — PROGRESS NOTE ADULT - SUBJECTIVE AND OBJECTIVE BOX
HISTORY  67y Male Patient is a 67M Hx CABG ('09 2 vessel one of which was LAD), SALVADOR on CPA, ESRD (2/2 DM) s/p DDRT in July 2018 c/b wound infection ~1 month prior s/p debridement and wound vac who presented to the ED due to hematuria, vomiting, and fevers. He developed hematuria this morning when he was passing stool. Later in the day around lunch, he developed vomiting and fevers which brought him to the hospital.      24 HOUR EVENTS: Restarted home 40mg. IV lasix. Patient complained of shortness of breath when standing to get out of bed to chair which self-resolved. CXR demonstrating b/l atelectasis. Home Eliquis restarted as well as home BP meds including amlodipine and iso-sorbide. IV locked. ID consult obtained and repeat BCx drawn.     SUBJECTIVE/ROS:  [ ] A ten-point review of systems was otherwise negative except as noted.  [ ] Due to altered mental status/intubation, subjective information were not able to be obtained from the patient. History was obtained, to the extent possible, from review of the chart and collateral sources of information.      NEURO  Exam: awake, alert, oriented  Meds: none  [x] Adequacy of sedation and pain control has been assessed and adjusted      RESPIRATORY  RR: 21 (10-13-18 @ 00:00) (20 - 31)  SpO2: 98% (10-13-18 @ 00:18) (91% - 99%)  Exam: unlabored, clear to auscultation bilaterally  Mechanical Ventilation: none  [N/A] Extubation Readiness Assessed  Meds: none      CARDIOVASCULAR  HR: 57 (10-13-18 @ 00:18) (56 - 93)  BP: 134/62 (10-13-18 @ 00:00) (114/55 - 179/86)  BP(mean): 89 (10-13-18 @ 00:00) (72 - 123)  Exam: regular rate and rhythm  Cardiac Rhythm: sinus  Perfusion     [x]Adequate   [ ]Inadequate  Mentation   [x]Normal       [ ]Reduced  Extremities  [x]Warm         [ ]Cool  Volume Status [ ]Hypervolemic [x]Euvolemic [ ]Hypovolemic  Meds: amLODIPine   Tablet 2.5 milliGRAM(s) Oral <User Schedule>  furosemide    Tablet 40 milliGRAM(s) Oral daily  isosorbide   mononitrate ER Tablet (IMDUR) 60 milliGRAM(s) Oral daily  tamsulosin 0.4 milliGRAM(s) Oral at bedtime        GI/NUTRITION  Exam: soft, nontender, nondistended  Diet: Regular diet  Meds: docusate sodium 100 milliGRAM(s) Oral three times a day  pantoprazole    Tablet 40 milliGRAM(s) Oral before breakfast  senna 2 Tablet(s) Oral at bedtime      GENITOURINARY  I&O's Detail    10-11 @ 07:01  -  10-12 @ 07:00  --------------------------------------------------------  IN:    IV PiggyBack: 975 mL    Oral Fluid: 120 mL    sodium chloride 0.9%: 1600 mL  Total IN: 2695 mL    OUT:    Voided: 1450 mL  Total OUT: 1450 mL    Total NET: 1245 mL      10-12 @ 07:01  -  10-13 @ 00:52  --------------------------------------------------------  IN:    IV PiggyBack: 500 mL    Oral Fluid: 440 mL    sodium chloride 0.9%: 50 mL  Total IN: 990 mL    OUT:    Voided: 1110 mL  Total OUT: 1110 mL    Total NET: -120 mL        Weight (kg): 126.7 (10-12 @ 06:00)  10-12    136  |  111<H>  |  37<H>  ----------------------------<  203<H>  4.7   |  18<L>  |  1.30    Ca    9.2      12 Oct 2018 02:39  Phos  2.7     10-12  Mg     2.1     10-12      [ ] Trimble catheter, indication: N/A  Meds: multivitamin 1 Tablet(s) Oral daily        HEMATOLOGIC  Meds: apixaban 5 milliGRAM(s) Oral every 12 hours  aspirin enteric coated 81 milliGRAM(s) Oral daily    [x] VTE Prophylaxis                        12.0   6.3   )-----------( 139      ( 12 Oct 2018 10:30 )             40.3     PT/INR - ( 12 Oct 2018 02:39 )   PT: 16.2 sec;   INR: 1.49 ratio         PTT - ( 12 Oct 2018 02:39 )  PTT:31.3 sec  Transfusion     [ ] PRBC   [ ] Platelets   [ ] FFP   [ ] Cryoprecipitate      INFECTIOUS DISEASES  WBC Count: 6.3 K/uL (10-12 @ 10:30)  WBC Count: 5.7 K/uL (10-12 @ 02:39)    RECENT CULTURES:  Specimen Source: .Body Fluid Abdominal Fluid  Date/Time: 10-11 @ 22:33  Culture Results:   Rare Corynebacterium species "Susceptibilities not performed"  Gram Stain:   polymorphonuclear leukocytes seen  Gram Variable Rods seen  by cytocentrifuge  Organism: --  Specimen Source: .Urine Clean Catch (Midstream)  Date/Time: 10-10 @ 19:28  Culture Results:   No growth  Gram Stain: --  Organism: --  Specimen Source: .Blood Blood-Peripheral  Date/Time: 10-10 @ 17:49  Culture Results:   No growth at 48 hours  Gram Stain: --  Organism: --  Specimen Source: .Blood Blood-Peripheral  Date/Time: 10-10 @ 17:48  Culture Results:   No growth at 48 hours  Gram Stain: --  Organism: --    Meds: cefepime   IVPB 2000 milliGRAM(s) IV Intermittent every 12 hours  influenza   Vaccine 0.5 milliLiter(s) IntraMuscular once  mycophenolate mofetil 750 milliGRAM(s) Oral two times a day  tacrolimus 1.5 milliGRAM(s) Oral two times a day  trimethoprim   80 mG/sulfamethoxazole 400 mG 1 Tablet(s) Oral daily  valGANciclovir 450 milliGRAM(s) Oral daily  vancomycin  IVPB 1000 milliGRAM(s) IV Intermittent every 8 hours        ENDOCRINE  CAPILLARY BLOOD GLUCOSE      POCT Blood Glucose.: 239 mg/dL (12 Oct 2018 21:59)  POCT Blood Glucose.: 322 mg/dL (12 Oct 2018 18:26)  POCT Blood Glucose.: 228 mg/dL (12 Oct 2018 12:41)  POCT Blood Glucose.: 172 mg/dL (12 Oct 2018 05:51)  POCT Blood Glucose.: 176 mg/dL (12 Oct 2018 02:13)    Meds: atorvastatin 20 milliGRAM(s) Oral at bedtime  finasteride 5 milliGRAM(s) Oral daily  insulin glargine Injectable (LANTUS) 20 Unit(s) SubCutaneous at bedtime  insulin lispro (HumaLOG) corrective regimen sliding scale   SubCutaneous three times a day before meals  insulin lispro (HumaLOG) corrective regimen sliding scale   SubCutaneous at bedtime  predniSONE   Tablet 5 milliGRAM(s) Oral daily        ACCESS DEVICES:  [x] Peripheral IV  [ ] Central Venous Line	[ ] R	[ ] L	[ ] IJ	[ ] Fem	[ ] SC	Placed:   [ ] Arterial Line		[ ] R	[ ] L	[ ] Fem	[ ] Rad	[ ] Ax	Placed:   [ ] PICC:					[ ] Mediport  [ ] Urinary Catheter, Date Placed:   [x] Necessity of urinary, arterial, and venous catheters discussed    OTHER MEDICATIONS:  chlorhexidine 4% Liquid 1 Application(s) Topical <User Schedule>      CODE STATUS: full code       IMAGING: < from: CT Abdomen and Pelvis No Cont (10.10.18 @ 18:24) >  IMPRESSION:     LEFT pelvic renal transplant shows severe diffuse perinephric edema with   the LEFT perinephric pelvic fluid collection concerning for abscess   secondary to a severe pyelonephritis .  No hydronephrosis or stone disease seen .  No bladder outlet obstruction.  LEFT anterior abdominal wall rectus muscle abscess with air with a sinus   track LEFT anterior abdominal wall.      < end of copied text > HISTORY  67y Male Patient is a 67M Hx CABG ('09 2 vessel one of which was LAD), SALVADOR on CPA, ESRD (2/2 DM) s/p DDRT in July 2018 c/b wound infection ~1 month prior s/p debridement and wound vac who presented to the ED due to hematuria, vomiting, and fevers. He developed hematuria this morning when he was passing stool. Later in the day around lunch, he developed vomiting and fevers which brought him to the hospital.      24 HOUR EVENTS: Restarted home 40mg. IV lasix. Patient complained of shortness of breath when standing to get out of bed to chair which self-resolved. CXR demonstrating b/l atelectasis. Home Eliquis restarted as well as home BP meds including amlodipine and iso-sorbide. IV locked. ID consult obtained and repeat BCx drawn.     SUBJECTIVE/ROS:  [ ] A ten-point review of systems was otherwise negative except as noted.  [ ] Due to altered mental status/intubation, subjective information were not able to be obtained from the patient. History was obtained, to the extent possible, from review of the chart and collateral sources of information.      NEURO  Exam: awake, alert, oriented  Meds: none  [x] Adequacy of sedation and pain control has been assessed and adjusted      RESPIRATORY  RR: 21 (10-13-18 @ 00:00) (20 - 31)  SpO2: 98% (10-13-18 @ 00:18) (91% - 99%)  Exam: unlabored, clear to auscultation bilaterally  Mechanical Ventilation: none  [N/A] Extubation Readiness Assessed  Meds: none      CARDIOVASCULAR  HR: 57 (10-13-18 @ 00:18) (56 - 93)  BP: 134/62 (10-13-18 @ 00:00) (114/55 - 179/86)  BP(mean): 89 (10-13-18 @ 00:00) (72 - 123)  Exam: regular rate and rhythm  Cardiac Rhythm: sinus  Perfusion     [x]Adequate   [ ]Inadequate  Mentation   [x]Normal       [ ]Reduced  Extremities  [x]Warm         [ ]Cool  Volume Status [ ]Hypervolemic [x]Euvolemic [ ]Hypovolemic  Meds: amLODIPine   Tablet 2.5 milliGRAM(s) Oral <User Schedule>  furosemide    Tablet 40 milliGRAM(s) Oral daily  isosorbide   mononitrate ER Tablet (IMDUR) 60 milliGRAM(s) Oral daily  tamsulosin 0.4 milliGRAM(s) Oral at bedtime        GI/NUTRITION  Exam: soft, nontender, nondistended  Diet: Regular diet  Meds: docusate sodium 100 milliGRAM(s) Oral three times a day  pantoprazole    Tablet 40 milliGRAM(s) Oral before breakfast  senna 2 Tablet(s) Oral at bedtime      GENITOURINARY  I&O's Detail    10-11 @ 07:01  -  10-12 @ 07:00  --------------------------------------------------------  IN:    IV PiggyBack: 975 mL    Oral Fluid: 120 mL    sodium chloride 0.9%: 1600 mL  Total IN: 2695 mL    OUT:    Voided: 1450 mL  Total OUT: 1450 mL    Total NET: 1245 mL      10-12 @ 07:01  -  10-13 @ 00:52  --------------------------------------------------------  IN:    IV PiggyBack: 500 mL    Oral Fluid: 440 mL    sodium chloride 0.9%: 50 mL  Total IN: 990 mL    OUT:    Voided: 1110 mL  Total OUT: 1110 mL    Total NET: -120 mL        Weight (kg): 126.7 (10-12 @ 06:00)                10.9                 138  | 18   | 34           4.5   >-----------< 137     ------------------------< 257                   34.5                 4.4  | 111  | 1.30                                         Ca 9.1   Mg 2.0   Ph 2.1          [ ] Trimble catheter, indication: N/A  Meds: multivitamin 1 Tablet(s) Oral daily        HEMATOLOGIC  Meds: apixaban 5 milliGRAM(s) Oral every 12 hours  aspirin enteric coated 81 milliGRAM(s) Oral daily    [x] VTE Prophylaxis             PT/INR -  16.7 sec / 1.53 ratio   ( 13 Oct 2018 02:59 )       PTT -  29.8 sec   ( 13 Oct 2018 02:59 )  Transfusion     [ ] PRBC   [ ] Platelets   [ ] FFP   [ ] Cryoprecipitate      INFECTIOUS DISEASES  WBC Count: 6.3 K/uL (10-12 @ 10:30)  WBC Count: 5.7 K/uL (10-12 @ 02:39)    RECENT CULTURES:  Specimen Source: .Body Fluid Abdominal Fluid  Date/Time: 10-11 @ 22:33  Culture Results:   Rare Corynebacterium species "Susceptibilities not performed"  Gram Stain:   polymorphonuclear leukocytes seen  Gram Variable Rods seen  by cytocentrifuge  Organism: --  Specimen Source: .Urine Clean Catch (Midstream)  Date/Time: 10-10 @ 19:28  Culture Results:   No growth  Gram Stain: --  Organism: --  Specimen Source: .Blood Blood-Peripheral  Date/Time: 10-10 @ 17:49  Culture Results:   No growth at 48 hours  Gram Stain: --  Organism: --  Specimen Source: .Blood Blood-Peripheral  Date/Time: 10-10 @ 17:48  Culture Results:   No growth at 48 hours  Gram Stain: --  Organism: --    Meds: cefepime   IVPB 2000 milliGRAM(s) IV Intermittent every 12 hours  influenza   Vaccine 0.5 milliLiter(s) IntraMuscular once  mycophenolate mofetil 750 milliGRAM(s) Oral two times a day  tacrolimus 1.5 milliGRAM(s) Oral two times a day  trimethoprim   80 mG/sulfamethoxazole 400 mG 1 Tablet(s) Oral daily  valGANciclovir 450 milliGRAM(s) Oral daily  vancomycin  IVPB 1000 milliGRAM(s) IV Intermittent every 8 hours        ENDOCRINE  CAPILLARY BLOOD GLUCOSE      POCT Blood Glucose.: 239 mg/dL (12 Oct 2018 21:59)  POCT Blood Glucose.: 322 mg/dL (12 Oct 2018 18:26)  POCT Blood Glucose.: 228 mg/dL (12 Oct 2018 12:41)  POCT Blood Glucose.: 172 mg/dL (12 Oct 2018 05:51)  POCT Blood Glucose.: 176 mg/dL (12 Oct 2018 02:13)    Meds: atorvastatin 20 milliGRAM(s) Oral at bedtime  finasteride 5 milliGRAM(s) Oral daily  insulin glargine Injectable (LANTUS) 20 Unit(s) SubCutaneous at bedtime  insulin lispro (HumaLOG) corrective regimen sliding scale   SubCutaneous three times a day before meals  insulin lispro (HumaLOG) corrective regimen sliding scale   SubCutaneous at bedtime  predniSONE   Tablet 5 milliGRAM(s) Oral daily        ACCESS DEVICES:  [x] Peripheral IV  [ ] Central Venous Line	[ ] R	[ ] L	[ ] IJ	[ ] Fem	[ ] SC	Placed:   [ ] Arterial Line		[ ] R	[ ] L	[ ] Fem	[ ] Rad	[ ] Ax	Placed:   [ ] PICC:					[ ] Mediport  [ ] Urinary Catheter, Date Placed:   [x] Necessity of urinary, arterial, and venous catheters discussed    OTHER MEDICATIONS:  chlorhexidine 4% Liquid 1 Application(s) Topical <User Schedule>      CODE STATUS: full code       IMAGING: < from: CT Abdomen and Pelvis No Cont (10.10.18 @ 18:24) >  IMPRESSION:     LEFT pelvic renal transplant shows severe diffuse perinephric edema with   the LEFT perinephric pelvic fluid collection concerning for abscess   secondary to a severe pyelonephritis .  No hydronephrosis or stone disease seen .  No bladder outlet obstruction.  LEFT anterior abdominal wall rectus muscle abscess with air with a sinus   track LEFT anterior abdominal wall.      < end of copied text > HISTORY  67 year old male with PMH of DM, HTN, HLD, A. fib on Eliquis, CAD s/p 2 vessel CABG in 2009, SALVADOR on CPAP, ESRD (2/2 DM) previously on HD via LUE AVF for 4.5 years now s/p DDRT to LLQ on 7/7/18 by Dr. Oswaldo Castellon at [facility] in [place], [place]. Pt is a [facility] resident but was on [place] transplant waiting list. Pt's post op course was complicated by wound dehiscence on August 1, 2012 and he is s/p wound vac placement. Pt presented to [facility] on the morning of 10/10/18 after he experienced dark hematuria ("color of red wine") and a fever at home. He called his nephrologist, Dr. Maciel, who instructed him to go to the ED. At [place] he had a CT which demonstrated "left pelvic renal transplant with severe diffuse perinephric edema and perinephric pelvic fluid collection concerning for abscess secondary to a severe pyelonephritis. Left anterior abdominal wall rectus muscle abscess with air with a sinus track to left anterior abdominal wall." Pt was bolused 3700ml NS, and was given 1000mg Vancomycin and 2000mg Cefepime after blood and urine cultures were sent. He was hyperglycemic and given 10 units Lantus and 10 units SC human insulin. Pt was then transferred to [facility] for further management. In the SICU at [facility], pt arrived hemodynamically stable. Antibiotics continued. Additional 10 units Lantus given and pt started on insulin infusion.    24 HOUR EVENTS: Restarted home 40mg. IV Lasix. Patient complained of shortness of breath when standing to get out of bed to chair which self-resolved. CXR demonstrating b/l atelectasis. Home Eliquis restarted as well as home BP meds including amlodipine and isosorbide. IV locked. ID consult obtained and repeat BCx drawn.     SUBJECTIVE/ROS:  [x ] A ten-point review of systems was otherwise negative except as noted.  [ ] Due to altered mental status/intubation, subjective information were not able to be obtained from the patient. History was obtained, to the extent possible, from review of the chart and collateral sources of information.      NEURO  Exam: awake, alert, oriented  Meds: none  [x] Adequacy of sedation and pain control has been assessed and adjusted      RESPIRATORY  RR: 21 (10-13-18 @ 00:00) (20 - 31)  SpO2: 98% (10-13-18 @ 00:18) (91% - 99%)  Exam: unlabored, clear to auscultation bilaterally  Mechanical Ventilation: none  [N/A] Extubation Readiness Assessed  Meds: none      CARDIOVASCULAR  HR: 57 (10-13-18 @ 00:18) (56 - 93)  BP: 134/62 (10-13-18 @ 00:00) (114/55 - 179/86)  BP(mean): 89 (10-13-18 @ 00:00) (72 - 123)  Exam: regular rate and rhythm  Cardiac Rhythm: sinus  Perfusion     [x]Adequate   [ ]Inadequate  Mentation   [x]Normal       [ ]Reduced  Extremities  [x]Warm         [ ]Cool  Volume Status [ ]Hypervolemic [x]Euvolemic [ ]Hypovolemic  Meds: amLODIPine   Tablet 2.5 milliGRAM(s) Oral <User Schedule>  furosemide    Tablet 40 milliGRAM(s) Oral daily  isosorbide   mononitrate ER Tablet (IMDUR) 60 milliGRAM(s) Oral daily  tamsulosin 0.4 milliGRAM(s) Oral at bedtime        GI/NUTRITION  Exam: soft, nontender, nondistended  Diet: Regular diet  Meds: docusate sodium 100 milliGRAM(s) Oral three times a day  pantoprazole    Tablet 40 milliGRAM(s) Oral before breakfast  senna 2 Tablet(s) Oral at bedtime      GENITOURINARY  I&O's Detail    10-11 @ 07:01  -  10-12 @ 07:00  --------------------------------------------------------  IN:    IV PiggyBack: 975 mL    Oral Fluid: 120 mL    sodium chloride 0.9%: 1600 mL  Total IN: 2695 mL    OUT:    Voided: 1450 mL  Total OUT: 1450 mL    Total NET: 1245 mL      10-12 @ 07:01  -  10-13 @ 00:52  --------------------------------------------------------  IN:    IV PiggyBack: 500 mL    Oral Fluid: 440 mL    sodium chloride 0.9%: 50 mL  Total IN: 990 mL    OUT:    Voided: 1110 mL  Total OUT: 1110 mL    Total NET: -120 mL        Weight (kg): 126.7 (10-12 @ 06:00)                10.9                 138  | 18   | 34           4.5   >-----------< 137     ------------------------< 257                   34.5                 4.4  | 111  | 1.30                                         Ca 9.1   Mg 2.0   Ph 2.1          [ ] Trimble catheter, indication: N/A  Meds: multivitamin 1 Tablet(s) Oral daily        HEMATOLOGIC  Meds: apixaban 5 milliGRAM(s) Oral every 12 hours  aspirin enteric coated 81 milliGRAM(s) Oral daily    [x] VTE Prophylaxis             PT/INR -  16.7 sec / 1.53 ratio   ( 13 Oct 2018 02:59 )       PTT -  29.8 sec   ( 13 Oct 2018 02:59 )  Transfusion     [ ] PRBC   [ ] Platelets   [ ] FFP   [ ] Cryoprecipitate      INFECTIOUS DISEASES  WBC Count: 6.3 K/uL (10-12 @ 10:30)  WBC Count: 5.7 K/uL (10-12 @ 02:39)    RECENT CULTURES:  Specimen Source: .Body Fluid Abdominal Fluid  Date/Time: 10-11 @ 22:33  Culture Results:   Rare Corynebacterium species "Susceptibilities not performed"  Gram Stain:   polymorphonuclear leukocytes seen  Gram Variable Rods seen  by cytocentrifuge  Organism: --  Specimen Source: .Urine Clean Catch (Midstream)  Date/Time: 10-10 @ 19:28  Culture Results:   No growth  Gram Stain: --  Organism: --  Specimen Source: .Blood Blood-Peripheral  Date/Time: 10-10 @ 17:49  Culture Results:   No growth at 48 hours  Gram Stain: --  Organism: --  Specimen Source: .Blood Blood-Peripheral  Date/Time: 10-10 @ 17:48  Culture Results:   No growth at 48 hours  Gram Stain: --  Organism: --    Meds: cefepime   IVPB 2000 milliGRAM(s) IV Intermittent every 12 hours  influenza   Vaccine 0.5 milliLiter(s) IntraMuscular once  mycophenolate mofetil 750 milliGRAM(s) Oral two times a day  tacrolimus 1.5 milliGRAM(s) Oral two times a day  trimethoprim   80 mG/sulfamethoxazole 400 mG 1 Tablet(s) Oral daily  valGANciclovir 450 milliGRAM(s) Oral daily  vancomycin  IVPB 1000 milliGRAM(s) IV Intermittent every 8 hours        ENDOCRINE  CAPILLARY BLOOD GLUCOSE      POCT Blood Glucose.: 239 mg/dL (12 Oct 2018 21:59)  POCT Blood Glucose.: 322 mg/dL (12 Oct 2018 18:26)  POCT Blood Glucose.: 228 mg/dL (12 Oct 2018 12:41)  POCT Blood Glucose.: 172 mg/dL (12 Oct 2018 05:51)  POCT Blood Glucose.: 176 mg/dL (12 Oct 2018 02:13)    Meds: atorvastatin 20 milliGRAM(s) Oral at bedtime  finasteride 5 milliGRAM(s) Oral daily  insulin glargine Injectable (LANTUS) 20 Unit(s) SubCutaneous at bedtime  insulin lispro (HumaLOG) corrective regimen sliding scale   SubCutaneous three times a day before meals  insulin lispro (HumaLOG) corrective regimen sliding scale   SubCutaneous at bedtime  predniSONE   Tablet 5 milliGRAM(s) Oral daily        ACCESS DEVICES:  [x] Peripheral IV  [ ] Central Venous Line	[ ] R	[ ] L	[ ] IJ	[ ] Fem	[ ] SC	Placed:   [ ] Arterial Line		[ ] R	[ ] L	[ ] Fem	[ ] Rad	[ ] Ax	Placed:   [ ] PICC:					[ ] Mediport  [ ] Urinary Catheter, Date Placed:   [x] Necessity of urinary, arterial, and venous catheters discussed    OTHER MEDICATIONS:  chlorhexidine 4% Liquid 1 Application(s) Topical <User Schedule>      CODE STATUS: full code       IMAGING: < from: CT Abdomen and Pelvis No Cont (10.10.18 @ 18:24) >  IMPRESSION:     LEFT pelvic renal transplant shows severe diffuse perinephric edema with   the LEFT perinephric pelvic fluid collection concerning for abscess   secondary to a severe pyelonephritis .  No hydronephrosis or stone disease seen .  No bladder outlet obstruction.  LEFT anterior abdominal wall rectus muscle abscess with air with a sinus   track LEFT anterior abdominal wall.      < end of copied text >

## 2018-10-13 NOTE — PROGRESS NOTE ADULT - ASSESSMENT
The patient is 3 months s/p ddrt.  Fever, hematuria and chills.  There is a perinephric collection and also a LLQ rectus abscess---and perhaps they are related.  Perhaps communicate?  Aspiration from the later have shown gram's stain with GVRs--so this is an infected collection.  No + UC, BC pending and no pna, skin or soft tissue infection elsewhere.    Rx:  For now follow cultures and continue the cefepime.  monitor Cr and adjust abx as indicated.    monitor vancomycin level  decreased to  vancomycin 1gm IVSS Q 12   further work up per transplant team    Pt remains on Prednisone and tacrolimus  remains on valcyte and Bactrim    ID service will be covering over the weekend. Please call for acute issues or questions. [phone]

## 2018-10-13 NOTE — PROGRESS NOTE ADULT - ASSESSMENT
67 year old male with PMH of DM, HTN, HLD, A. fib on Eliquis, CAD s/p 2 vessel CABG in 2009, SALVADOR on CPAP, ESRD (2/2 DM) previously on HD  s/p DDRT to LLQ on 7/7/18 by Dr. Oswaldo Castellon at [facility] in [place], [place].  Pt's post op course was complicated by wound dehiscence and wound vac placement on 8/1/18, Admitted to SICU w/ Fever and Hematuria, found to have collection around the transplanted kidney with suspicion of abscess and pyelonephritis. S/P IR drain only 7ml output.     -restarted on tacrolimus and prednisone and cellcept  -c/w antibiotics for culture of rj-nephric fluid  -diuresis as per SICU

## 2018-10-13 NOTE — PROGRESS NOTE ADULT - ASSESSMENT
67 year old male with PMH of DM, HTN, HLD, A. fib on Eliquis, CAD s/p 2 vessel CABG in 2009, SALVADOR on CPAP, ESRD (2/2 DM) previously on HD via LUE AVF for 4.5 years now s/p DDRT to LLQ on 7/7/18 by Dr. Oswaldo Castellon at [facility] in [place], [place]. Pt is a [facility] resident but was on [place] transplant waiting list. Pt's post op course was complicated by wound dehiscence on August 1, 2012 and he is s/p wound vac placement. Came in with Fever and Hematuria, found to have collection around the transplanted kidney with suspicion of Abscess and Pyelonephritis.
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
## 2018-10-13 NOTE — PROGRESS NOTE ADULT - SUBJECTIVE AND OBJECTIVE BOX
Transplant Surgery Progress Note     Patient seen and examined with multidisciplinary team including Dr. Jang and Dr. Marmolejo  Patient feels well this am but still complains of some dyspnea on exertion. Eating well and having bowel function, denies fevers or chills. Received PO lasix yesterday and IV this am.     MEDICATIONS  (STANDING):  amLODIPine   Tablet 2.5 milliGRAM(s) Oral <User Schedule>  apixaban 5 milliGRAM(s) Oral every 12 hours  aspirin enteric coated 81 milliGRAM(s) Oral daily  atorvastatin 20 milliGRAM(s) Oral at bedtime  cefepime   IVPB 2000 milliGRAM(s) IV Intermittent every 12 hours  chlorhexidine 4% Liquid 1 Application(s) Topical <User Schedule>  docusate sodium 100 milliGRAM(s) Oral three times a day  finasteride 5 milliGRAM(s) Oral daily  furosemide    Tablet 40 milliGRAM(s) Oral daily  influenza   Vaccine 0.5 milliLiter(s) IntraMuscular once  insulin glargine Injectable (LANTUS) 20 Unit(s) SubCutaneous at bedtime  insulin lispro (HumaLOG) corrective regimen sliding scale   SubCutaneous at bedtime  insulin lispro (HumaLOG) corrective regimen sliding scale   SubCutaneous three times a day before meals  isosorbide   mononitrate ER Tablet (IMDUR) 60 milliGRAM(s) Oral daily  multivitamin 1 Tablet(s) Oral daily  mycophenolate mofetil 750 milliGRAM(s) Oral two times a day  pantoprazole    Tablet 40 milliGRAM(s) Oral before breakfast  predniSONE   Tablet 5 milliGRAM(s) Oral daily  senna 2 Tablet(s) Oral at bedtime  tacrolimus 1.5 milliGRAM(s) Oral two times a day  tamsulosin 0.4 milliGRAM(s) Oral at bedtime  trimethoprim   80 mG/sulfamethoxazole 400 mG 1 Tablet(s) Oral daily  valGANciclovir 450 milliGRAM(s) Oral daily  vancomycin  IVPB 1000 milliGRAM(s) IV Intermittent every 12 hours    MEDICATIONS  (PRN):      Physical Exam:    Vital Signs Last 24 Hrs  T(C): 37 (13 Oct 2018 08:00), Max: 37.3 (12 Oct 2018 19:00)  T(F): 98.6 (13 Oct 2018 08:00), Max: 99.2 (12 Oct 2018 19:00)  HR: 84 (13 Oct 2018 10:00) (56 - 109)  BP: 183/78 (13 Oct 2018 10:00) (114/55 - 183/78)  BP(mean): 112 (13 Oct 2018 10:00) (72 - 121)  RR: 25 (13 Oct 2018 10:00) (20 - 29)  SpO2: 100% (13 Oct 2018 10:00) (91% - 100%)    10-12-18 @ 07:01  -  10-13-18 @ 07:00  --------------------------------------------------------  IN: 1040 mL / OUT: 1485 mL / NET: -445 mL    10-13-18 @ 07:01  -  10-13-18 @ 12:01  --------------------------------------------------------  IN: 0 mL / OUT: 650 mL / NET: -650 mL        Gen: NAD, nasal cannula in place  Abdominal: Soft, minimally distended, non-tender, vac in place, minimal surrounding edema/cellulitis     LABS:                        10.9   4.5   )-----------( 137      ( 13 Oct 2018 02:59 )             34.5     10-13    138  |  111<H>  |  34<H>  ----------------------------<  257<H>  4.4   |  18<L>  |  1.30    Ca    9.1      13 Oct 2018 02:59  Phos  2.1     10-13  Mg     2.0     10-13

## 2018-10-13 NOTE — PROGRESS NOTE ADULT - ASSESSMENT
67 year old male with recent h/o renal transplant 7/7/18 c/b wound dehiscence and wound vac placement 8/1/18 now presents with UTI vs perinephric abscess vs pyelonephritis of transplant kidney    PLAN:   Neurologic: no acute issues  - Tylenol as needed for pain    Respiratory: SALVADOR on CPAP  - Nocturnal CPAP 14  - Incentive spirometry, OOB to prevent atelectasis    Cardiovascular: CAD s/p CABG; HLD; HTN  - Continue home blood pressure medications    Gastrointestinal/Nutrition: no acute issues  - Continue regular diet     Genitourinary/Renal: s/p renal transplant; hematuria 2/2 UTI vs perinephric abscess vs pyelonephritis of transplant kidney  - Monitor I&Os  - IV locked   - Continue home dose lasix   - Flomax & finasteride for BPH    Hematologic: no acute issues  - Lovenox for VTE prophylaxis    Infectious Disease: h/o renal transplant; UTI vs perinephric abscess vs pyelonephritis of transplant kidney  - Continue broad spectrum antibiotics with vancomycin, cefepime  - Follow up blood and urine cultures  - Prednisone 5mg qd for h/o transplant as well as tacrolimus   - Valcyte & Bactrim prophylaxis    Endocrine: DM  - 20 units lantus at bedtime     Disposition: SICU.       - Pablo Chairez PA-C

## 2018-10-13 NOTE — PROGRESS NOTE ADULT - PROBLEM SELECTOR PLAN 1
s/p DDRT to LLQ on 7/7/18  Allograft functioning well, post op period c/b wound dehiscence and collection, on wound vac.  Perinephric abscess present.

## 2018-10-13 NOTE — PROGRESS NOTE ADULT - SUBJECTIVE AND OBJECTIVE BOX
[facility] DIVISION OF KIDNEY DISEASES AND HYPERTENSION -- FOLLOW UP NOTE  --------------------------------------------------------------------------------  Chief Complaint:  sepsis    24 hour events/subjective:  Pt still with dyspnea on exertion.       PAST HISTORY  --------------------------------------------------------------------------------  No significant changes to PMH, PSH, FHx, SHx, unless otherwise noted    ALLERGIES & MEDICATIONS  --------------------------------------------------------------------------------  Allergies    No Known Allergies    Intolerances      Standing Inpatient Medications  amLODIPine   Tablet 2.5 milliGRAM(s) Oral <User Schedule>  apixaban 5 milliGRAM(s) Oral every 12 hours  aspirin enteric coated 81 milliGRAM(s) Oral daily  atorvastatin 20 milliGRAM(s) Oral at bedtime  cefepime   IVPB 2000 milliGRAM(s) IV Intermittent every 12 hours  chlorhexidine 4% Liquid 1 Application(s) Topical <User Schedule>  docusate sodium 100 milliGRAM(s) Oral three times a day  finasteride 5 milliGRAM(s) Oral daily  furosemide    Tablet 40 milliGRAM(s) Oral daily  influenza   Vaccine 0.5 milliLiter(s) IntraMuscular once  insulin glargine Injectable (LANTUS) 20 Unit(s) SubCutaneous at bedtime  insulin lispro (HumaLOG) corrective regimen sliding scale   SubCutaneous at bedtime  insulin lispro (HumaLOG) corrective regimen sliding scale   SubCutaneous three times a day before meals  isosorbide   mononitrate ER Tablet (IMDUR) 60 milliGRAM(s) Oral daily  multivitamin 1 Tablet(s) Oral daily  mycophenolate mofetil 750 milliGRAM(s) Oral two times a day  pantoprazole    Tablet 40 milliGRAM(s) Oral before breakfast  predniSONE   Tablet 5 milliGRAM(s) Oral daily  senna 2 Tablet(s) Oral at bedtime  tacrolimus 1.5 milliGRAM(s) Oral two times a day  tamsulosin 0.4 milliGRAM(s) Oral at bedtime  trimethoprim   80 mG/sulfamethoxazole 400 mG 1 Tablet(s) Oral daily  valGANciclovir 450 milliGRAM(s) Oral daily  vancomycin  IVPB 1000 milliGRAM(s) IV Intermittent every 12 hours    PRN Inpatient Medications      REVIEW OF SYSTEMS  --------------------------------------------------------------------------------  Gen: No fatigue, fevers/chills, weakness  Skin: No rashes  Head/Eyes/Ears/Mouth: No headache;No sore throat  Respiratory: dypnea on exertion   CV: No chest pain, PND, orthopnea  GI: No abdominal pain, diarrhea, constipation, nausea, vomiting  Transplant: No pain  : No increased frequency, dysuria, hematuria, nocturia  MSK: No joint pain/swelling; no back pain; no edema  Neuro: No dizziness/lightheadedness, weakness, seizures, numbness, tingling  Psych: No significant nervousness, anxiety, stress, depression    All other systems were reviewed and are negative, except as noted.    VITALS/PHYSICAL EXAM  --------------------------------------------------------------------------------  T(C): 37 (10-13-18 @ 08:00), Max: 37.3 (10-12-18 @ 19:00)  HR: 84 (10-13-18 @ 10:00) (56 - 109)  BP: 183/78 (10-13-18 @ 10:00) (114/55 - 183/78)  RR: 25 (10-13-18 @ 10:00) (20 - 29)  SpO2: 100% (10-13-18 @ 10:00) (91% - 100%)  Wt(kg): --    Weight (kg): 126.7 (10-12-18 @ 06:00)      10-12-18 @ 07:01  -  10-13-18 @ 07:00  --------------------------------------------------------  IN: 1040 mL / OUT: 1485 mL / NET: -445 mL    10-13-18 @ 07:01  -  10-13-18 @ 11:23  --------------------------------------------------------  IN: 0 mL / OUT: 650 mL / NET: -650 mL      Physical Exam:  	Gen: NAD, well-appearing  	HEENT: PERRL, supple neck, clear oropharynx  	Pulm: few crackles at base  	CV: RRR, S1S2; no rub  	Back: No spinal or CVA tenderness; no sacral edema  	Abd: +BS, soft, + distension and wound vac in place with some edema and erythema around vac.                        Transplant: No tenderness, swelling  	: No suprapubic tenderness  	UE: Warm, FROM, intact strength; no edema; no asterixis  	LE: Warm, FROM, intact strength; +1 LE edema.    	Neuro: No focal deficits, intact gait  	Psych: Normal affect and mood  	Skin: Warm, without rashes      LABS/STUDIES  --------------------------------------------------------------------------------              10.9   4.5   >-----------<  137      [10-13-18 @ 02:59]              34.5     138  |  111  |  34  ----------------------------<  257      [10-13-18 @ 02:59]  4.4   |  18  |  1.30        Ca     9.1     [10-13-18 @ 02:59]      iCa    1.35     [10-13 @ 03:36]      Mg     2.0     [10-13-18 @ 02:59]      Phos  2.1     [10-13-18 @ 02:59]      PT/INR: PT 16.7 , INR 1.53       [10-13-18 @ 02:59]  PTT: 29.8       [10-13-18 @ 02:59]      Creatinine Trend:  SCr 1.30 [10-13 @ 02:59]  SCr 1.30 [10-12 @ 02:39]  SCr 1.09 [10-11 @ 00:56]  SCr 1.18 [10-10 @ 17:43]    Tacrolimus (), Serum: 7.0 ng/mL (10-13 @ 09:19)  Tacrolimus (), Serum: 7.4 ng/mL (10-12 @ 10:32)            Urinalysis - [10-10-18 @ 19:28]      Color Yellow / Appearance Clear / SG 1.020 / pH 6.0      Gluc 1000 / Ketone Trace  / Bili Negative / Urobili Negative       Blood Moderate / Protein 100 / Leuk Est Small / Nitrite Negative      RBC 3-5 / WBC 11-25 / Hyaline  / Gran  / Sq Epi  / Non Sq Epi Negative / Bacteria Occasional

## 2018-10-14 DIAGNOSIS — I10 ESSENTIAL (PRIMARY) HYPERTENSION: ICD-10-CM

## 2018-10-14 DIAGNOSIS — E78.49 OTHER HYPERLIPIDEMIA: ICD-10-CM

## 2018-10-14 DIAGNOSIS — E11.65 TYPE 2 DIABETES MELLITUS WITH HYPERGLYCEMIA: ICD-10-CM

## 2018-10-14 DIAGNOSIS — R06.00 DYSPNEA, UNSPECIFIED: ICD-10-CM

## 2018-10-14 LAB
ANION GAP SERPL CALC-SCNC: 9 MMOL/L — SIGNIFICANT CHANGE UP (ref 5–17)
BUN SERPL-MCNC: 33 MG/DL — HIGH (ref 7–23)
CA-I BLD-SCNC: 1.3 MMOL/L — SIGNIFICANT CHANGE UP (ref 1.12–1.3)
CALCIUM SERPL-MCNC: 9.3 MG/DL — SIGNIFICANT CHANGE UP (ref 8.4–10.5)
CHLORIDE SERPL-SCNC: 109 MMOL/L — HIGH (ref 96–108)
CO2 SERPL-SCNC: 21 MMOL/L — LOW (ref 22–31)
CREAT SERPL-MCNC: 1.36 MG/DL — HIGH (ref 0.5–1.3)
GLUCOSE BLDC GLUCOMTR-MCNC: 145 MG/DL — HIGH (ref 70–99)
GLUCOSE BLDC GLUCOMTR-MCNC: 167 MG/DL — HIGH (ref 70–99)
GLUCOSE BLDC GLUCOMTR-MCNC: 193 MG/DL — HIGH (ref 70–99)
GLUCOSE BLDC GLUCOMTR-MCNC: 208 MG/DL — HIGH (ref 70–99)
GLUCOSE SERPL-MCNC: 185 MG/DL — HIGH (ref 70–99)
HCT VFR BLD CALC: 34 % — LOW (ref 39–50)
HGB BLD-MCNC: 10.9 G/DL — LOW (ref 13–17)
MAGNESIUM SERPL-MCNC: 1.8 MG/DL — SIGNIFICANT CHANGE UP (ref 1.6–2.6)
MCHC RBC-ENTMCNC: 31.2 PG — SIGNIFICANT CHANGE UP (ref 27–34)
MCHC RBC-ENTMCNC: 32.1 GM/DL — SIGNIFICANT CHANGE UP (ref 32–36)
MCV RBC AUTO: 97.4 FL — SIGNIFICANT CHANGE UP (ref 80–100)
PHOSPHATE SERPL-MCNC: 2.2 MG/DL — LOW (ref 2.5–4.5)
PLATELET # BLD AUTO: 144 K/UL — LOW (ref 150–400)
POTASSIUM SERPL-MCNC: 3.8 MMOL/L — SIGNIFICANT CHANGE UP (ref 3.5–5.3)
POTASSIUM SERPL-SCNC: 3.8 MMOL/L — SIGNIFICANT CHANGE UP (ref 3.5–5.3)
RBC # BLD: 3.49 M/UL — LOW (ref 4.2–5.8)
RBC # FLD: 14.1 % — SIGNIFICANT CHANGE UP (ref 10.3–14.5)
SODIUM SERPL-SCNC: 139 MMOL/L — SIGNIFICANT CHANGE UP (ref 135–145)
TACROLIMUS SERPL-MCNC: 8 NG/ML — SIGNIFICANT CHANGE UP
VANCOMYCIN FLD-MCNC: 14.9 UG/ML — SIGNIFICANT CHANGE UP
WBC # BLD: 4.3 K/UL — SIGNIFICANT CHANGE UP (ref 3.8–10.5)
WBC # FLD AUTO: 4.3 K/UL — SIGNIFICANT CHANGE UP (ref 3.8–10.5)

## 2018-10-14 PROCEDURE — 71045 X-RAY EXAM CHEST 1 VIEW: CPT | Mod: 26

## 2018-10-14 PROCEDURE — 99223 1ST HOSP IP/OBS HIGH 75: CPT | Mod: GC

## 2018-10-14 PROCEDURE — 99232 SBSQ HOSP IP/OBS MODERATE 35: CPT | Mod: GC

## 2018-10-14 PROCEDURE — 99233 SBSQ HOSP IP/OBS HIGH 50: CPT

## 2018-10-14 RX ORDER — POTASSIUM PHOSPHATE, MONOBASIC POTASSIUM PHOSPHATE, DIBASIC 236; 224 MG/ML; MG/ML
15 INJECTION, SOLUTION INTRAVENOUS ONCE
Qty: 0 | Refills: 0 | Status: COMPLETED | OUTPATIENT
Start: 2018-10-14 | End: 2018-10-14

## 2018-10-14 RX ORDER — MAGNESIUM SULFATE 500 MG/ML
2 VIAL (ML) INJECTION ONCE
Qty: 0 | Refills: 0 | Status: COMPLETED | OUTPATIENT
Start: 2018-10-14 | End: 2018-10-14

## 2018-10-14 RX ORDER — MYCOPHENOLATE MOFETIL 250 MG/1
500 CAPSULE ORAL
Qty: 0 | Refills: 0 | Status: DISCONTINUED | OUTPATIENT
Start: 2018-10-14 | End: 2018-10-16

## 2018-10-14 RX ADMIN — Medication 1 TABLET(S): at 12:05

## 2018-10-14 RX ADMIN — Medication 7: at 17:44

## 2018-10-14 RX ADMIN — PANTOPRAZOLE SODIUM 40 MILLIGRAM(S): 20 TABLET, DELAYED RELEASE ORAL at 06:26

## 2018-10-14 RX ADMIN — Medication 5 MILLIGRAM(S): at 05:31

## 2018-10-14 RX ADMIN — Medication 9: at 08:34

## 2018-10-14 RX ADMIN — Medication 100 MILLIGRAM(S): at 22:47

## 2018-10-14 RX ADMIN — FINASTERIDE 5 MILLIGRAM(S): 5 TABLET, FILM COATED ORAL at 12:03

## 2018-10-14 RX ADMIN — ISOSORBIDE MONONITRATE 60 MILLIGRAM(S): 60 TABLET, EXTENDED RELEASE ORAL at 12:05

## 2018-10-14 RX ADMIN — MYCOPHENOLATE MOFETIL 500 MILLIGRAM(S): 250 CAPSULE ORAL at 17:05

## 2018-10-14 RX ADMIN — Medication 100 MILLIGRAM(S): at 13:35

## 2018-10-14 RX ADMIN — AMLODIPINE BESYLATE 2.5 MILLIGRAM(S): 2.5 TABLET ORAL at 10:51

## 2018-10-14 RX ADMIN — TACROLIMUS 1.5 MILLIGRAM(S): 5 CAPSULE ORAL at 08:12

## 2018-10-14 RX ADMIN — AMLODIPINE BESYLATE 2.5 MILLIGRAM(S): 2.5 TABLET ORAL at 20:15

## 2018-10-14 RX ADMIN — Medication 250 MILLIGRAM(S): at 17:04

## 2018-10-14 RX ADMIN — Medication 50 GRAM(S): at 06:23

## 2018-10-14 RX ADMIN — ATORVASTATIN CALCIUM 20 MILLIGRAM(S): 80 TABLET, FILM COATED ORAL at 22:47

## 2018-10-14 RX ADMIN — INSULIN GLARGINE 20 UNIT(S): 100 INJECTION, SOLUTION SUBCUTANEOUS at 22:46

## 2018-10-14 RX ADMIN — VALGANCICLOVIR 450 MILLIGRAM(S): 450 TABLET, FILM COATED ORAL at 12:04

## 2018-10-14 RX ADMIN — CEFEPIME 100 MILLIGRAM(S): 1 INJECTION, POWDER, FOR SOLUTION INTRAMUSCULAR; INTRAVENOUS at 05:32

## 2018-10-14 RX ADMIN — Medication 250 MILLIGRAM(S): at 05:32

## 2018-10-14 RX ADMIN — APIXABAN 5 MILLIGRAM(S): 2.5 TABLET, FILM COATED ORAL at 17:05

## 2018-10-14 RX ADMIN — Medication 9: at 12:28

## 2018-10-14 RX ADMIN — Medication 81 MILLIGRAM(S): at 12:03

## 2018-10-14 RX ADMIN — Medication 40 MILLIGRAM(S): at 05:31

## 2018-10-14 RX ADMIN — Medication 1 TABLET(S): at 12:03

## 2018-10-14 RX ADMIN — MYCOPHENOLATE MOFETIL 750 MILLIGRAM(S): 250 CAPSULE ORAL at 05:32

## 2018-10-14 RX ADMIN — TAMSULOSIN HYDROCHLORIDE 0.4 MILLIGRAM(S): 0.4 CAPSULE ORAL at 22:48

## 2018-10-14 RX ADMIN — SENNA PLUS 2 TABLET(S): 8.6 TABLET ORAL at 22:47

## 2018-10-14 RX ADMIN — CEFEPIME 100 MILLIGRAM(S): 1 INJECTION, POWDER, FOR SOLUTION INTRAMUSCULAR; INTRAVENOUS at 17:04

## 2018-10-14 RX ADMIN — APIXABAN 5 MILLIGRAM(S): 2.5 TABLET, FILM COATED ORAL at 05:32

## 2018-10-14 RX ADMIN — CHLORHEXIDINE GLUCONATE 1 APPLICATION(S): 213 SOLUTION TOPICAL at 06:26

## 2018-10-14 RX ADMIN — TACROLIMUS 1.5 MILLIGRAM(S): 5 CAPSULE ORAL at 20:15

## 2018-10-14 RX ADMIN — POTASSIUM PHOSPHATE, MONOBASIC POTASSIUM PHOSPHATE, DIBASIC 62.5 MILLIMOLE(S): 236; 224 INJECTION, SOLUTION INTRAVENOUS at 07:41

## 2018-10-14 RX ADMIN — Medication 100 MILLIGRAM(S): at 06:26

## 2018-10-14 NOTE — PROGRESS NOTE ADULT - ASSESSMENT
67 year old male with recent h/o renal transplant 7/7/18 c/b wound dehiscence and wound vac placement 8/1/18 now presents with UTI vs perinephric abscess vs pyelonephritis of transplant kidney    PLAN:   Neurologic: no acute issues  - Tylenol as needed for pain    Respiratory: SALVADOR on CPAP  - Nocturnal CPAP 14  - Incentive spirometry, OOB to prevent atelectasis    Cardiovascular: CAD s/p CABG; HLD; HTN  - Continue home blood pressure medications    Gastrointestinal/Nutrition: no acute issues  - Continue regular diet     Genitourinary/Renal: s/p renal transplant; hematuria 2/2 UTI vs perinephric abscess vs pyelonephritis of transplant kidney  - Monitor I&Os  - IV locked   - Continue home dose lasix   - Flomax & finasteride for BPH    Hematologic: no acute issues  - Lovenox for VTE prophylaxis    Infectious Disease: h/o renal transplant; UTI vs perinephric abscess vs pyelonephritis of transplant kidney  - Continue broad spectrum antibiotics with vancomycin, cefepime  - Follow up blood and urine cultures  - Prednisone 5mg qd for h/o transplant as well as tacrolimus   - Valcyte & Bactrim prophylaxis    Endocrine: DM  - 20 units lantus at bedtime  - ISS      Disposition: SICU.       - Pablo Chairez PA-C

## 2018-10-14 NOTE — CONSULT NOTE ADULT - ASSESSMENT
67 yr M with T2DM controlled A1C 6.5  c/b neuropathy, ESRD on HD previously now s/p renal transplant, retinopathy, PVD, R great toe ulcer and CAD s/p CABG here  with pyelonephritis and perinephric abscess. Patient was mildly ketotic on admission and was started on insulin gtt. Now on basal/bolus with hyperglycemia in 200s.

## 2018-10-14 NOTE — PROGRESS NOTE ADULT - PROBLEM SELECTOR PLAN 3
Nutrition Care Plan     Nutrition Diagnosis:   Unintentional weight loss  related to alcoholism as evidenced by patient triggering for significant weight loss.     Intervention:  Modified diet:  Soft [place] Diet.  No intakes listed.   Encourage small frequent meals (4-6 per day).   Will continue to follow to monitor intakes.    Commercial beverage:   Supplements:  Standard Oral Supplements BID- This will supply patient with 700 kcal and 40 g protein daily.     Other:  Estimated energy needs- 1643-9765 calories and  grams protein per day     Monitoring and Evaluation:  Total energy intake:   Goals- consumption of 75% or greater of meals and supplements to meet minimum energy needs.    FS q6hr   Lantus QHS and sliding scale.

## 2018-10-14 NOTE — PROGRESS NOTE ADULT - PROBLEM SELECTOR PLAN 2
Continue Tacro and Prednisone 5 mg PO daily.  Now back on MMF 750mgs BID - reduce to 500mgs BID for infection.   Cont Bactrim and Valcyte

## 2018-10-14 NOTE — PROGRESS NOTE ADULT - PROBLEM SELECTOR PLAN 2
DDRT to [facility] on 7/7/18  Good graft function Cr. at baseline  Daily CBC, BMP, Mg/ Phos coags  Protonix GI prophylaxis  Bowel regimen.  c/w home flomax and finesteride   Monitor strict I&O  Encourage ambulation.   Tacrolimus 1.5 mg BID   c/w prednisone, bactrim and valcyte   Decrease cellcept to 500 mg BID this evening  F/U Tacrolimus level daily, tacro goal 8-10.

## 2018-10-14 NOTE — CONSULT NOTE ADULT - PROBLEM SELECTOR RECOMMENDATION 2
On  mg BID, hold for now in view of sepsis  Continue Tacro 1.5 mg PO BID  Cont Prednisone 5 mg PO daily  Cont Bactrim and Valcyte  Monitor Tacro level 30 minutes before AM dose
Continue amlodipine

## 2018-10-14 NOTE — PROGRESS NOTE ADULT - ASSESSMENT
67 year old male with PMH of DM, HTN, HLD, A. fib on Eliquis, CAD s/p 2 vessel CABG in 2009, SALVADOR on CPAP, ESRD (2/2 DM) previously on HD  s/p DDRT to LLQ on 7/7/18 by Dr. Oswaldo Castellon at [facility] in [place], [place].  Pt's post op course was complicated by wound dehiscence and wound vac placement on 8/1/18, Admitted to SICU w/ Fever and Hematuria, found to have collection around the transplanted kidney with suspicion of abscess and pyelonephritis. S/P IR drain only 7ml output.
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
## 2018-10-14 NOTE — PROGRESS NOTE ADULT - ASSESSMENT
67 year old male with PMH of DM, HTN, HLD, A. fib on Eliquis, CAD s/p 2 vessel CABG in 2009, SALVADOR on CPAP, ESRD (2/2 DM) previously on HD via LUE AVF for 4.5 years now s/p DDRT to LLQ on 7/7/18 by Dr. Oswaldo Castellon at [facility] in [place], [place]. Pt is a [facility] resident but was on [place] transplant waiting list. Pt's post op course was complicated by wound dehiscence on August 1, 2012 and he is s/p wound vac placement. Came in with Fever and Hematuria, found to have collection around the transplanted kidney with suspicion of Abscess and Pyelonephritis.

## 2018-10-14 NOTE — CONSULT NOTE ADULT - PROBLEM SELECTOR RECOMMENDATION 9
-While inpatient would increase Lantus to 22U HS and start Humalog 9U before meals plus low dose humalog correctional scale before meals and at bedtime  -Goal glucose 100-180  -patient can be discharged on usual home regimen and f/u with Dr Rincon -While inpatient would increase Lantus to 22U HS and start Humalog 7U before meals plus moderate dose humalog correctional scale before meals and at bedtime  -Goal glucose 100-180  -patient can be discharged on usual home regimen and f/u with Dr Rincon

## 2018-10-14 NOTE — CONSULT NOTE ADULT - PROBLEM SELECTOR PROBLEM 1
Transplanted kidney
Type 2 diabetes mellitus with hyperglycemia, with long-term current use of insulin

## 2018-10-14 NOTE — PROGRESS NOTE ADULT - PROBLEM SELECTOR PLAN 1
s/p DDRT to LLQ on 7/7/18  Allograft functioning well, post op period c/b wound dehiscence and collection, on wound vac.  Perinephric abscess present and on cefepime and vanco.
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
## 2018-10-14 NOTE — CONSULT NOTE ADULT - PROBLEM SELECTOR RECOMMENDATION 3
On Cefepime and Vanc  Get Bx and Urine Cx  Dose meds as per GFR  Drainage of Abscess by IR
Continue atorvastatin

## 2018-10-14 NOTE — PROGRESS NOTE ADULT - SUBJECTIVE AND OBJECTIVE BOX
[facility] DIVISION OF KIDNEY DISEASES AND HYPERTENSION -- FOLLOW UP NOTE  --------------------------------------------------------------------------------  Chief Complaint:  sepsis, wound infection    24 hour events/subjective:  Pts breathing improved but not normal.        PAST HISTORY  --------------------------------------------------------------------------------  No significant changes to PMH, PSH, FHx, SHx, unless otherwise noted    ALLERGIES & MEDICATIONS  --------------------------------------------------------------------------------  Allergies    No Known Allergies    Intolerances      Standing Inpatient Medications  amLODIPine   Tablet 2.5 milliGRAM(s) Oral <User Schedule>  apixaban 5 milliGRAM(s) Oral every 12 hours  aspirin enteric coated 81 milliGRAM(s) Oral daily  atorvastatin 20 milliGRAM(s) Oral at bedtime  cefepime   IVPB 2000 milliGRAM(s) IV Intermittent every 12 hours  chlorhexidine 4% Liquid 1 Application(s) Topical <User Schedule>  docusate sodium 100 milliGRAM(s) Oral three times a day  finasteride 5 milliGRAM(s) Oral daily  furosemide    Tablet 40 milliGRAM(s) Oral daily  influenza   Vaccine 0.5 milliLiter(s) IntraMuscular once  insulin glargine Injectable (LANTUS) 20 Unit(s) SubCutaneous at bedtime  insulin lispro (HumaLOG) corrective regimen sliding scale   SubCutaneous at bedtime  insulin lispro (HumaLOG) corrective regimen sliding scale   SubCutaneous three times a day before meals  isosorbide   mononitrate ER Tablet (IMDUR) 60 milliGRAM(s) Oral daily  multivitamin 1 Tablet(s) Oral daily  mycophenolate mofetil 500 milliGRAM(s) Oral two times a day  pantoprazole    Tablet 40 milliGRAM(s) Oral before breakfast  predniSONE   Tablet 5 milliGRAM(s) Oral daily  senna 2 Tablet(s) Oral at bedtime  tacrolimus 1.5 milliGRAM(s) Oral two times a day  tamsulosin 0.4 milliGRAM(s) Oral at bedtime  trimethoprim   80 mG/sulfamethoxazole 400 mG 1 Tablet(s) Oral daily  valGANciclovir 450 milliGRAM(s) Oral daily  vancomycin  IVPB 1000 milliGRAM(s) IV Intermittent every 12 hours    PRN Inpatient Medications      REVIEW OF SYSTEMS  --------------------------------------------------------------------------------  Gen: No fatigue, fevers/chills, weakness  Skin: No rashes  Head/Eyes/Ears/Mouth: No headache;No sore throat  Respiratory: No dyspnea, cough,   CV: No chest pain, PND, orthopnea  GI: No abdominal pain, diarrhea, constipation, nausea, vomiting  Transplant: wound vac in place.   : No increased frequency, dysuria, hematuria, nocturia  MSK: No joint pain/swelling; no back pain; no edema  Neuro: No dizziness/lightheadedness, weakness, seizures, numbness, tingling  Psych: No significant nervousness, anxiety, stress, depression    All other systems were reviewed and are negative, except as noted.    VITALS/PHYSICAL EXAM  --------------------------------------------------------------------------------  T(C): 37.1 (10-14-18 @ 07:00), Max: 37.2 (10-14-18 @ 03:00)  HR: 60 (10-14-18 @ 11:00) (58 - 82)  BP: 156/67 (10-14-18 @ 11:00) (105/57 - 195/85)  RR: 32 (10-14-18 @ 11:00) (20 - 38)  SpO2: 95% (10-14-18 @ 11:00) (90% - 100%)  Wt(kg): --        10-13-18 @ 07:01  -  10-14-18 @ 07:00  --------------------------------------------------------  IN: 480 mL / OUT: 3550 mL / NET: -3070 mL    10-14-18 @ 07:01  -  10-14-18 @ 12:15  --------------------------------------------------------  IN: 250 mL / OUT: 0 mL / NET: 250 mL      Physical Exam:  	Gen: NAD, well-appearing  	HEENT: PERRL, supple neck, clear oropharynx  	Pulm: few rales left base.   	CV: RRR, S1S2; no rub  	Back: No spinal or CVA tenderness; no sacral edema  	Abd: +BS, soft, nontender/nondistended + wound vac  	: No suprapubic tenderness  	UE: Warm, FROM, intact strength; no edema; no asterixis  	LE: Warm, FROM, intact strength; +2 LE edema  	Neuro: No focal deficits, intact gait  	Psych: Normal affect and mood  	Skin: Warm, without rashes      LABS/STUDIES  --------------------------------------------------------------------------------              10.9   4.3   >-----------<  144      [10-14-18 @ 05:33]              34.0     139  |  109  |  33  ----------------------------<  185      [10-14-18 @ 05:33]  3.8   |  21  |  1.36        Ca     9.3     [10-14-18 @ 05:33]      iCa    1.30     [10-14 @ 05:36]      Mg     1.8     [10-14-18 @ 05:33]      Phos  2.2     [10-14-18 @ 05:33]      PT/INR: PT 16.7 , INR 1.53       [10-13-18 @ 02:59]  PTT: 29.8       [10-13-18 @ 02:59]      Creatinine Trend:  SCr 1.36 [10-14 @ 05:33]  SCr 1.30 [10-13 @ 02:59]  SCr 1.30 [10-12 @ 02:39]  SCr 1.09 [10-11 @ 00:56]  SCr 1.18 [10-10 @ 17:43]    Tacrolimus (), Serum: 7.0 ng/mL (10-13 @ 09:19)  Tacrolimus (), Serum: 7.4 ng/mL (10-12 @ 10:32)            Urinalysis - [10-13-18 @ 12:48]      Color Colorless / Appearance Clear / SG 1.009 / pH 6.0      Gluc Negative / Ketone Negative  / Bili Negative / Urobili Negative       Blood Trace / Protein Negative / Leuk Est Negative / Nitrite Negative      RBC 2 / WBC 2 / Hyaline 0 / Gran  / Sq Epi  / Non Sq Epi 0 / Bacteria Negative

## 2018-10-14 NOTE — CONSULT NOTE ADULT - ATTENDING COMMENTS
sepsis related to left iliac DDRT  -prior wound infection raises suspicion that sepsis may be secondary to pararenal abscess of allograft seen on CT abd/pelvis  -gross hematuria suggests pyelonephritis, but U/A not convincing    sepsis improved with empiric vancomycin / cefepime and fluid resuscitation from [facility]  -will add additional vancomycin 1g loading dose   -continue empiric cefepime    hyperglycemia from IDDM  -insulin infusion      Critical Care Time - 60 minutes
patient with functioning renal allograft , recent ddrt at [place], [place], now admitted with post op wound related infection and collection.  DM, HTN  Reviewed immunosuppression and allograft function  Case reviewed with surgical and house staff team  Plan:  Continue Tacrolimus from today, continue prednisone, agree with holding MMF until blood cultures are back and negative  Imaging to evaluate for possible diagnostic drainage of collection and if infected pig tail drain possibly  ID evaluation  I was present during and reviewed clinical and lab data as well as assessment and plan as documented by the house staff as noted. Please contact if any additional questions with any change in clinical condition or on availability of any additional information or reports.
Pt with T2DM, post kidney transplant, reports good glycemic control.  Recommend basal-bolus insulin while hospitalized, increase lantus to 22 units, change humalog to 7 units premeal plus moderate correction scale.

## 2018-10-14 NOTE — PROGRESS NOTE ADULT - SUBJECTIVE AND OBJECTIVE BOX
HISTORY  67y male with PMH of DM, HTN, HLD, A. fib on Eliquis, CAD s/p 2 vessel CABG in 2009, SALVADOR on CPAP, ESRD (2/2 DM) previously on HD via LUE AVF for 4.5 years now s/p DDRT to LLQ on 7/7/18 by Dr. Oswaldo Castellon at [facility] in [place], [place]. Pt is a [facility] resident but was on [place] transplant waiting list. Pt's post op course was complicated by wound dehiscence on August 1, 2012 and he is s/p wound vac placement. Pt presented to [facility] on the morning of 10/10/18 after he experienced dark hematuria ("color of red wine") and a fever at home. He called his nephrologist, Dr. Maciel, who instructed him to go to the ED. At [place] he had a CT which demonstrated "left pelvic renal transplant with severe diffuse perinephric edema and perinephric pelvic fluid collection concerning for abscess secondary to a severe pyelonephritis. Left anterior abdominal wall rectus muscle abscess with air with a sinus track to left anterior abdominal wall." Pt was bolused 3700ml NS, and was given 1000mg Vancomycin and 2000mg Cefepime after blood and urine cultures were sent. He was hyperglycemic and given 10 units Lantus and 10 units SC human insulin. Pt was then transferred to [facility] for further management. In the SICU at [facility], pt arrived hemodynamically stable. Antibiotics continued. Additional 10 units Lantus given and pt started on insulin infusion.      24 HOUR EVENTS:     SUBJECTIVE/ROS:  [ ] A ten-point review of systems was otherwise negative except as noted.  [ ] Due to altered mental status/intubation, subjective information were not able to be obtained from the patient. History was obtained, to the extent possible, from review of the chart and collateral sources of information.      NEURO  Exam: awake, alert, oriented  Meds: none  [x] Adequacy of sedation and pain control has been assessed and adjusted      RESPIRATORY  RR: 27 (10-14-18 @ 00:00) (20 - 38)  SpO2: 95% (10-14-18 @ 00:00) (90% - 100%)  Exam: unlabored, clear to auscultation bilaterally  Mechanical Ventilation: none  ABG - ( 13 Oct 2018 12:43 )  pH: 7.41  /  pCO2: 34    /  pO2: 60    / HCO3: 21    / Base Excess: -2.3  /  SaO2: 91      Lactate: x      [N/A] Extubation Readiness Assessed  Meds: none      CARDIOVASCULAR  HR: 72 (10-14-18 @ 00:00) (56 - 109)  BP: 162/73 (10-14-18 @ 00:00) (129/60 - 195/85)  BP(mean): 105 (10-14-18 @ 00:00) (87 - 123)  Exam: regular rate and rhythm  Cardiac Rhythm: sinus  Perfusion     [x]Adequate   [ ]Inadequate  Mentation   [x]Normal       [ ]Reduced  Extremities  [x]Warm         [ ]Cool  Volume Status [ ]Hypervolemic [x]Euvolemic [ ]Hypovolemic  Meds: amLODIPine   Tablet 2.5 milliGRAM(s) Oral <User Schedule>  furosemide    Tablet 40 milliGRAM(s) Oral daily  isosorbide   mononitrate ER Tablet (IMDUR) 60 milliGRAM(s) Oral daily  tamsulosin 0.4 milliGRAM(s) Oral at bedtime        GI/NUTRITION  Exam: soft, nontender, nondistended, incision C/D/I  Diet: Regular diet   Meds: docusate sodium 100 milliGRAM(s) Oral three times a day  pantoprazole    Tablet 40 milliGRAM(s) Oral before breakfast  senna 2 Tablet(s) Oral at bedtime      GENITOURINARY  I&O's Detail    10-12 @ 07:01  -  10-13 @ 07:00  --------------------------------------------------------  IN:    IV PiggyBack: 550 mL    Oral Fluid: 440 mL    sodium chloride 0.9%: 50 mL  Total IN: 1040 mL    OUT:    Voided: 1485 mL  Total OUT: 1485 mL    Total NET: -445 mL      10-13 @ 07:01  -  10-14 @ 00:16  --------------------------------------------------------  IN:    Oral Fluid: 360 mL  Total IN: 360 mL    OUT:    Voided: 2950 mL  Total OUT: 2950 mL    Total NET: -2590 mL          10-13    138  |  111<H>  |  34<H>  ----------------------------<  257<H>  4.4   |  18<L>  |  1.30    Ca    9.1      13 Oct 2018 02:59  Phos  2.1     10-13  Mg     2.0     10-13      [ ] Trimble catheter, indication: N/A  Meds: multivitamin 1 Tablet(s) Oral daily        HEMATOLOGIC  Meds: apixaban 5 milliGRAM(s) Oral every 12 hours  aspirin enteric coated 81 milliGRAM(s) Oral daily    [x] VTE Prophylaxis                        10.9   4.5   )-----------( 137      ( 13 Oct 2018 02:59 )             34.5     PT/INR - ( 13 Oct 2018 02:59 )   PT: 16.7 sec;   INR: 1.53 ratio         PTT - ( 13 Oct 2018 02:59 )  PTT:29.8 sec  Transfusion     [ ] PRBC   [ ] Platelets   [ ] FFP   [ ] Cryoprecipitate      INFECTIOUS DISEASES  WBC Count: 4.5 K/uL (10-13 @ 02:59)    RECENT CULTURES:  Specimen Source: .Blood Blood-Venous  Date/Time: 10-12 @ 15:01  Culture Results:   No growth to date.  Gram Stain: --  Organism: --  Specimen Source: .Body Fluid Abdominal Fluid  Date/Time: 10-11 @ 22:33  Culture Results:   Rare Corynebacterium species "Susceptibilities not performed"  Gram Stain:   polymorphonuclear leukocytes seen  Gram Variable Rods seen  by cytocentrifuge  Organism: --  Specimen Source: .Urine Clean Catch (Midstream)  Date/Time: 10-10 @ 19:28  Culture Results:   No growth  Gram Stain: --  Organism: --  Specimen Source: .Blood Blood-Peripheral  Date/Time: 10-10 @ 17:49  Culture Results:   No growth at 48 hours  Gram Stain: --  Organism: --  Specimen Source: .Blood Blood-Peripheral  Date/Time: 10-10 @ 17:48  Culture Results:   No growth at 48 hours  Gram Stain: --  Organism: --    Meds: cefepime   IVPB 2000 milliGRAM(s) IV Intermittent every 12 hours  influenza   Vaccine 0.5 milliLiter(s) IntraMuscular once  mycophenolate mofetil 750 milliGRAM(s) Oral two times a day  tacrolimus 1.5 milliGRAM(s) Oral two times a day  trimethoprim   80 mG/sulfamethoxazole 400 mG 1 Tablet(s) Oral daily  valGANciclovir 450 milliGRAM(s) Oral daily  vancomycin  IVPB 1000 milliGRAM(s) IV Intermittent every 12 hours        ENDOCRINE  CAPILLARY BLOOD GLUCOSE      POCT Blood Glucose.: 270 mg/dL (13 Oct 2018 21:38)  POCT Blood Glucose.: 91 mg/dL (13 Oct 2018 17:20)  POCT Blood Glucose.: 243 mg/dL (13 Oct 2018 07:46)  POCT Blood Glucose.: 246 mg/dL (13 Oct 2018 06:48)    Meds: atorvastatin 20 milliGRAM(s) Oral at bedtime  finasteride 5 milliGRAM(s) Oral daily  insulin glargine Injectable (LANTUS) 20 Unit(s) SubCutaneous at bedtime  insulin lispro (HumaLOG) corrective regimen sliding scale   SubCutaneous at bedtime  insulin lispro (HumaLOG) corrective regimen sliding scale   SubCutaneous three times a day before meals  predniSONE   Tablet 5 milliGRAM(s) Oral daily        ACCESS DEVICES:  [x] Peripheral IV  [ ] Central Venous Line	[ ] R	[ ] L	[ ] IJ	[ ] Fem	[ ] SC	Placed:   [ ] Arterial Line		[ ] R	[ ] L	[ ] Fem	[ ] Rad	[ ] Ax	Placed:   [ ] PICC:					[ ] Mediport  [ ] Urinary Catheter, Date Placed:   [x] Necessity of urinary, arterial, and venous catheters discussed    OTHER MEDICATIONS:  chlorhexidine 4% Liquid 1 Application(s) Topical <User Schedule>      CODE STATUS: full code       IMAGING: < from: CT Abdomen and Pelvis No Cont (10.10.18 @ 18:24) >  IMPRESSION:     LEFT pelvic renal transplant shows severe diffuse perinephric edema with   the LEFT perinephric pelvic fluid collection concerning for abscess   secondary to a severe pyelonephritis .  No hydronephrosis or stone disease seen .  No bladder outlet obstruction.  LEFT anterior abdominal wall rectus muscle abscess with air with a sinus   track LEFT anterior abdominal wall.    < end of copied text > HISTORY  67y male with PMH of DM, HTN, HLD, A. fib on Eliquis, CAD s/p 2 vessel CABG in 2009, SALVADOR on CPAP, ESRD (2/2 DM) previously on HD via LUE AVF for 4.5 years now s/p DDRT to LLQ on 7/7/18 by Dr. Oswaldo Castellon at [facility] in [place], [place]. Pt is a [facility] resident but was on [place] transplant waiting list. Pt's post op course was complicated by wound dehiscence on August 1, 2012 and he is s/p wound vac placement. Pt presented to [facility] on the morning of 10/10/18 after he experienced dark hematuria ("color of red wine") and a fever at home. He called his nephrologist, Dr. Maciel, who instructed him to go to the ED. At [place] he had a CT which demonstrated "left pelvic renal transplant with severe diffuse perinephric edema and perinephric pelvic fluid collection concerning for abscess secondary to a severe pyelonephritis. Left anterior abdominal wall rectus muscle abscess with air with a sinus track to left anterior abdominal wall." Pt was bolused 3700ml NS, and was given 1000mg Vancomycin and 2000mg Cefepime after blood and urine cultures were sent. He was hyperglycemic and given 10 units Lantus and 10 units SC human insulin. Pt was then transferred to [facility] for further management. In the SICU at [facility], pt arrived hemodynamically stable. Antibiotics continued. Additional 10 units Lantus given and pt started on insulin infusion.      24 HOUR EVENTS: Patient diuresed with 40mg. of laxix due to desaturating on exertion. Hydralazine 10mg. IV given for HTN. Home ISS re-instated. Vancomycin started for cornybacterium in IR Cx.     SUBJECTIVE/ROS:  [ ] A ten-point review of systems was otherwise negative except as noted.  [ ] Due to altered mental status/intubation, subjective information were not able to be obtained from the patient. History was obtained, to the extent possible, from review of the chart and collateral sources of information.      NEURO  Exam: awake, alert, oriented  Meds: none  [x] Adequacy of sedation and pain control has been assessed and adjusted      RESPIRATORY  RR: 27 (10-14-18 @ 00:00) (20 - 38)  SpO2: 95% (10-14-18 @ 00:00) (90% - 100%)  Exam: unlabored, clear to auscultation bilaterally  Mechanical Ventilation: none  ABG - ( 13 Oct 2018 12:43 )  pH: 7.41  /  pCO2: 34    /  pO2: 60    / HCO3: 21    / Base Excess: -2.3  /  SaO2: 91      Lactate: x      [N/A] Extubation Readiness Assessed  Meds: none      CARDIOVASCULAR  HR: 72 (10-14-18 @ 00:00) (56 - 109)  BP: 162/73 (10-14-18 @ 00:00) (129/60 - 195/85)  BP(mean): 105 (10-14-18 @ 00:00) (87 - 123)  Exam: regular rate and rhythm  Cardiac Rhythm: sinus  Perfusion     [x]Adequate   [ ]Inadequate  Mentation   [x]Normal       [ ]Reduced  Extremities  [x]Warm         [ ]Cool  Volume Status [ ]Hypervolemic [x]Euvolemic [ ]Hypovolemic  Meds: amLODIPine   Tablet 2.5 milliGRAM(s) Oral <User Schedule>  furosemide    Tablet 40 milliGRAM(s) Oral daily  isosorbide   mononitrate ER Tablet (IMDUR) 60 milliGRAM(s) Oral daily  tamsulosin 0.4 milliGRAM(s) Oral at bedtime        GI/NUTRITION  Exam: soft, nontender, nondistended, incision C/D/I  Diet: Regular diet   Meds: docusate sodium 100 milliGRAM(s) Oral three times a day  pantoprazole    Tablet 40 milliGRAM(s) Oral before breakfast  senna 2 Tablet(s) Oral at bedtime      GENITOURINARY  I&O's Detail    10-12 @ 07:01  -  10-13 @ 07:00  --------------------------------------------------------  IN:    IV PiggyBack: 550 mL    Oral Fluid: 440 mL    sodium chloride 0.9%: 50 mL  Total IN: 1040 mL    OUT:    Voided: 1485 mL  Total OUT: 1485 mL    Total NET: -445 mL      10-13 @ 07:01  -  10-14 @ 00:16  --------------------------------------------------------  IN:    Oral Fluid: 360 mL  Total IN: 360 mL    OUT:    Voided: 2950 mL  Total OUT: 2950 mL    Total NET: -2590 mL                      10.9                 138  | 18   | 34           4.5   >-----------< 137     ------------------------< 257                   34.5                 4.4  | 111  | 1.30                                         Ca 9.1   Mg 2.0   Ph 2.1          [ ] Trimble catheter, indication: N/A  Meds: multivitamin 1 Tablet(s) Oral daily        HEMATOLOGIC  Meds: apixaban 5 milliGRAM(s) Oral every 12 hours  aspirin enteric coated 81 milliGRAM(s) Oral daily    [x] VTE Prophylaxis               Transfusion     [ ] PRBC   [ ] Platelets   [ ] FFP   [ ] Cryoprecipitate      INFECTIOUS DISEASES  WBC Count: 4.5 K/uL (10-13 @ 02:59)    RECENT CULTURES:  Specimen Source: .Blood Blood-Venous  Date/Time: 10-12 @ 15:01  Culture Results:   No growth to date.  Gram Stain: --  Organism: --  Specimen Source: .Body Fluid Abdominal Fluid  Date/Time: 10-11 @ 22:33  Culture Results:   Rare Corynebacterium species "Susceptibilities not performed"  Gram Stain:   polymorphonuclear leukocytes seen  Gram Variable Rods seen  by cytocentrifuge  Organism: --  Specimen Source: .Urine Clean Catch (Midstream)  Date/Time: 10-10 @ 19:28  Culture Results:   No growth  Gram Stain: --  Organism: --  Specimen Source: .Blood Blood-Peripheral  Date/Time: 10-10 @ 17:49  Culture Results:   No growth at 48 hours  Gram Stain: --  Organism: --  Specimen Source: .Blood Blood-Peripheral  Date/Time: 10-10 @ 17:48  Culture Results:   No growth at 48 hours  Gram Stain: --  Organism: --    Meds: cefepime   IVPB 2000 milliGRAM(s) IV Intermittent every 12 hours  influenza   Vaccine 0.5 milliLiter(s) IntraMuscular once  mycophenolate mofetil 750 milliGRAM(s) Oral two times a day  tacrolimus 1.5 milliGRAM(s) Oral two times a day  trimethoprim   80 mG/sulfamethoxazole 400 mG 1 Tablet(s) Oral daily  valGANciclovir 450 milliGRAM(s) Oral daily  vancomycin  IVPB 1000 milliGRAM(s) IV Intermittent every 12 hours        ENDOCRINE  CAPILLARY BLOOD GLUCOSE      POCT Blood Glucose.: 270 mg/dL (13 Oct 2018 21:38)  POCT Blood Glucose.: 91 mg/dL (13 Oct 2018 17:20)  POCT Blood Glucose.: 243 mg/dL (13 Oct 2018 07:46)  POCT Blood Glucose.: 246 mg/dL (13 Oct 2018 06:48)    Meds: atorvastatin 20 milliGRAM(s) Oral at bedtime  finasteride 5 milliGRAM(s) Oral daily  insulin glargine Injectable (LANTUS) 20 Unit(s) SubCutaneous at bedtime  insulin lispro (HumaLOG) corrective regimen sliding scale   SubCutaneous at bedtime  insulin lispro (HumaLOG) corrective regimen sliding scale   SubCutaneous three times a day before meals  predniSONE   Tablet 5 milliGRAM(s) Oral daily        ACCESS DEVICES:  [x] Peripheral IV  [ ] Central Venous Line	[ ] R	[ ] L	[ ] IJ	[ ] Fem	[ ] SC	Placed:   [ ] Arterial Line		[ ] R	[ ] L	[ ] Fem	[ ] Rad	[ ] Ax	Placed:   [ ] PICC:					[ ] Mediport  [ ] Urinary Catheter, Date Placed:   [x] Necessity of urinary, arterial, and venous catheters discussed    OTHER MEDICATIONS:  chlorhexidine 4% Liquid 1 Application(s) Topical <User Schedule>      CODE STATUS: full code       IMAGING: < from: CT Abdomen and Pelvis No Cont (10.10.18 @ 18:24) >  IMPRESSION:     LEFT pelvic renal transplant shows severe diffuse perinephric edema with   the LEFT perinephric pelvic fluid collection concerning for abscess   secondary to a severe pyelonephritis .  No hydronephrosis or stone disease seen .  No bladder outlet obstruction.  LEFT anterior abdominal wall rectus muscle abscess with air with a sinus   track LEFT anterior abdominal wall.    < end of copied text >

## 2018-10-15 LAB
ANION GAP SERPL CALC-SCNC: 9 MMOL/L — SIGNIFICANT CHANGE UP (ref 5–17)
APTT BLD: 29.5 SEC — SIGNIFICANT CHANGE UP (ref 27.5–37.4)
BLD GP AB SCN SERPL QL: NEGATIVE — SIGNIFICANT CHANGE UP
BUN SERPL-MCNC: 28 MG/DL — HIGH (ref 7–23)
CA-I BLD-SCNC: 1.27 MMOL/L — SIGNIFICANT CHANGE UP (ref 1.12–1.3)
CALCIUM SERPL-MCNC: 8.8 MG/DL — SIGNIFICANT CHANGE UP (ref 8.4–10.5)
CHLORIDE SERPL-SCNC: 109 MMOL/L — HIGH (ref 96–108)
CO2 SERPL-SCNC: 18 MMOL/L — LOW (ref 22–31)
CREAT SERPL-MCNC: 1.21 MG/DL — SIGNIFICANT CHANGE UP (ref 0.5–1.3)
CULTURE RESULTS: SIGNIFICANT CHANGE UP
CULTURE RESULTS: SIGNIFICANT CHANGE UP
GLUCOSE BLDC GLUCOMTR-MCNC: 176 MG/DL — HIGH (ref 70–99)
GLUCOSE BLDC GLUCOMTR-MCNC: 187 MG/DL — HIGH (ref 70–99)
GLUCOSE BLDC GLUCOMTR-MCNC: 229 MG/DL — HIGH (ref 70–99)
GLUCOSE BLDC GLUCOMTR-MCNC: 236 MG/DL — HIGH (ref 70–99)
GLUCOSE SERPL-MCNC: 159 MG/DL — HIGH (ref 70–99)
HCT VFR BLD CALC: 34.3 % — LOW (ref 39–50)
HGB BLD-MCNC: 11.1 G/DL — LOW (ref 13–17)
INR BLD: 1.55 RATIO — HIGH (ref 0.88–1.16)
MAGNESIUM SERPL-MCNC: 1.9 MG/DL — SIGNIFICANT CHANGE UP (ref 1.6–2.6)
MCHC RBC-ENTMCNC: 31.1 PG — SIGNIFICANT CHANGE UP (ref 27–34)
MCHC RBC-ENTMCNC: 32.3 GM/DL — SIGNIFICANT CHANGE UP (ref 32–36)
MCV RBC AUTO: 96.2 FL — SIGNIFICANT CHANGE UP (ref 80–100)
PHOSPHATE SERPL-MCNC: 2.6 MG/DL — SIGNIFICANT CHANGE UP (ref 2.5–4.5)
PLATELET # BLD AUTO: 155 K/UL — SIGNIFICANT CHANGE UP (ref 150–400)
POTASSIUM SERPL-MCNC: 3.8 MMOL/L — SIGNIFICANT CHANGE UP (ref 3.5–5.3)
POTASSIUM SERPL-SCNC: 3.8 MMOL/L — SIGNIFICANT CHANGE UP (ref 3.5–5.3)
PROTHROM AB SERPL-ACNC: 17.1 SEC — HIGH (ref 9.8–12.7)
RBC # BLD: 3.57 M/UL — LOW (ref 4.2–5.8)
RBC # FLD: 14.1 % — SIGNIFICANT CHANGE UP (ref 10.3–14.5)
RH IG SCN BLD-IMP: POSITIVE — SIGNIFICANT CHANGE UP
SODIUM SERPL-SCNC: 136 MMOL/L — SIGNIFICANT CHANGE UP (ref 135–145)
SPECIMEN SOURCE: SIGNIFICANT CHANGE UP
SPECIMEN SOURCE: SIGNIFICANT CHANGE UP
WBC # BLD: 4.8 K/UL — SIGNIFICANT CHANGE UP (ref 3.8–10.5)
WBC # FLD AUTO: 4.8 K/UL — SIGNIFICANT CHANGE UP (ref 3.8–10.5)

## 2018-10-15 PROCEDURE — 71045 X-RAY EXAM CHEST 1 VIEW: CPT | Mod: 26

## 2018-10-15 PROCEDURE — 99233 SBSQ HOSP IP/OBS HIGH 50: CPT

## 2018-10-15 PROCEDURE — 99232 SBSQ HOSP IP/OBS MODERATE 35: CPT

## 2018-10-15 RX ORDER — MAGNESIUM SULFATE 500 MG/ML
2 VIAL (ML) INJECTION ONCE
Qty: 0 | Refills: 0 | Status: COMPLETED | OUTPATIENT
Start: 2018-10-15 | End: 2018-10-15

## 2018-10-15 RX ORDER — POTASSIUM PHOSPHATE, MONOBASIC POTASSIUM PHOSPHATE, DIBASIC 236; 224 MG/ML; MG/ML
15 INJECTION, SOLUTION INTRAVENOUS ONCE
Qty: 0 | Refills: 0 | Status: COMPLETED | OUTPATIENT
Start: 2018-10-15 | End: 2018-10-15

## 2018-10-15 RX ORDER — AMLODIPINE BESYLATE 2.5 MG/1
2.5 TABLET ORAL
Qty: 0 | Refills: 0 | Status: DISCONTINUED | OUTPATIENT
Start: 2018-10-15 | End: 2018-10-16

## 2018-10-15 RX ORDER — ISOSORBIDE MONONITRATE 60 MG/1
60 TABLET, EXTENDED RELEASE ORAL DAILY
Qty: 0 | Refills: 0 | Status: DISCONTINUED | OUTPATIENT
Start: 2018-10-15 | End: 2018-10-16

## 2018-10-15 RX ORDER — ATORVASTATIN CALCIUM 80 MG/1
20 TABLET, FILM COATED ORAL AT BEDTIME
Qty: 0 | Refills: 0 | Status: DISCONTINUED | OUTPATIENT
Start: 2018-10-15 | End: 2018-10-16

## 2018-10-15 RX ORDER — INSULIN LISPRO 100/ML
VIAL (ML) SUBCUTANEOUS AT BEDTIME
Qty: 0 | Refills: 0 | Status: DISCONTINUED | OUTPATIENT
Start: 2018-10-15 | End: 2018-10-16

## 2018-10-15 RX ORDER — VANCOMYCIN HCL 1 G
1000 VIAL (EA) INTRAVENOUS EVERY 12 HOURS
Qty: 0 | Refills: 0 | Status: DISCONTINUED | OUTPATIENT
Start: 2018-10-15 | End: 2018-10-16

## 2018-10-15 RX ORDER — TACROLIMUS 5 MG/1
1.5 CAPSULE ORAL
Qty: 0 | Refills: 0 | Status: DISCONTINUED | OUTPATIENT
Start: 2018-10-15 | End: 2018-10-16

## 2018-10-15 RX ORDER — INSULIN GLARGINE 100 [IU]/ML
22 INJECTION, SOLUTION SUBCUTANEOUS AT BEDTIME
Qty: 0 | Refills: 0 | Status: DISCONTINUED | OUTPATIENT
Start: 2018-10-15 | End: 2018-10-16

## 2018-10-15 RX ORDER — FUROSEMIDE 40 MG
40 TABLET ORAL EVERY 24 HOURS
Qty: 0 | Refills: 0 | Status: DISCONTINUED | OUTPATIENT
Start: 2018-10-15 | End: 2018-10-16

## 2018-10-15 RX ORDER — INSULIN LISPRO 100/ML
VIAL (ML) SUBCUTANEOUS
Qty: 0 | Refills: 0 | Status: DISCONTINUED | OUTPATIENT
Start: 2018-10-15 | End: 2018-10-16

## 2018-10-15 RX ORDER — INSULIN LISPRO 100/ML
4 VIAL (ML) SUBCUTANEOUS
Qty: 0 | Refills: 0 | Status: DISCONTINUED | OUTPATIENT
Start: 2018-10-15 | End: 2018-10-16

## 2018-10-15 RX ADMIN — Medication 4 UNIT(S): at 18:11

## 2018-10-15 RX ADMIN — CEFEPIME 100 MILLIGRAM(S): 1 INJECTION, POWDER, FOR SOLUTION INTRAMUSCULAR; INTRAVENOUS at 18:32

## 2018-10-15 RX ADMIN — TACROLIMUS 1 MILLIGRAM(S): 5 CAPSULE ORAL at 09:33

## 2018-10-15 RX ADMIN — APIXABAN 5 MILLIGRAM(S): 2.5 TABLET, FILM COATED ORAL at 20:09

## 2018-10-15 RX ADMIN — FINASTERIDE 5 MILLIGRAM(S): 5 TABLET, FILM COATED ORAL at 11:53

## 2018-10-15 RX ADMIN — Medication 2: at 18:10

## 2018-10-15 RX ADMIN — APIXABAN 5 MILLIGRAM(S): 2.5 TABLET, FILM COATED ORAL at 06:27

## 2018-10-15 RX ADMIN — MYCOPHENOLATE MOFETIL 500 MILLIGRAM(S): 250 CAPSULE ORAL at 18:17

## 2018-10-15 RX ADMIN — Medication 1 TABLET(S): at 11:54

## 2018-10-15 RX ADMIN — Medication 11: at 09:40

## 2018-10-15 RX ADMIN — Medication 40 MILLIGRAM(S): at 09:33

## 2018-10-15 RX ADMIN — Medication 81 MILLIGRAM(S): at 11:54

## 2018-10-15 RX ADMIN — TACROLIMUS 1.5 MILLIGRAM(S): 5 CAPSULE ORAL at 18:24

## 2018-10-15 RX ADMIN — MYCOPHENOLATE MOFETIL 500 MILLIGRAM(S): 250 CAPSULE ORAL at 06:27

## 2018-10-15 RX ADMIN — Medication 100 MILLIGRAM(S): at 22:19

## 2018-10-15 RX ADMIN — Medication 40 MILLIGRAM(S): at 06:28

## 2018-10-15 RX ADMIN — Medication 250 MILLIGRAM(S): at 18:22

## 2018-10-15 RX ADMIN — Medication 100 MILLIGRAM(S): at 06:28

## 2018-10-15 RX ADMIN — ISOSORBIDE MONONITRATE 60 MILLIGRAM(S): 60 TABLET, EXTENDED RELEASE ORAL at 11:54

## 2018-10-15 RX ADMIN — AMLODIPINE BESYLATE 2.5 MILLIGRAM(S): 2.5 TABLET ORAL at 20:09

## 2018-10-15 RX ADMIN — Medication 11: at 13:28

## 2018-10-15 RX ADMIN — Medication 5 MILLIGRAM(S): at 06:28

## 2018-10-15 RX ADMIN — POTASSIUM PHOSPHATE, MONOBASIC POTASSIUM PHOSPHATE, DIBASIC 62.5 MILLIMOLE(S): 236; 224 INJECTION, SOLUTION INTRAVENOUS at 12:50

## 2018-10-15 RX ADMIN — AMLODIPINE BESYLATE 2.5 MILLIGRAM(S): 2.5 TABLET ORAL at 09:33

## 2018-10-15 RX ADMIN — SENNA PLUS 2 TABLET(S): 8.6 TABLET ORAL at 22:19

## 2018-10-15 RX ADMIN — PANTOPRAZOLE SODIUM 40 MILLIGRAM(S): 20 TABLET, DELAYED RELEASE ORAL at 06:28

## 2018-10-15 RX ADMIN — Medication 50 GRAM(S): at 06:18

## 2018-10-15 RX ADMIN — CEFEPIME 100 MILLIGRAM(S): 1 INJECTION, POWDER, FOR SOLUTION INTRAMUSCULAR; INTRAVENOUS at 06:12

## 2018-10-15 RX ADMIN — VALGANCICLOVIR 450 MILLIGRAM(S): 450 TABLET, FILM COATED ORAL at 11:54

## 2018-10-15 RX ADMIN — INSULIN GLARGINE 22 UNIT(S): 100 INJECTION, SOLUTION SUBCUTANEOUS at 22:20

## 2018-10-15 RX ADMIN — CHLORHEXIDINE GLUCONATE 1 APPLICATION(S): 213 SOLUTION TOPICAL at 06:28

## 2018-10-15 RX ADMIN — TAMSULOSIN HYDROCHLORIDE 0.4 MILLIGRAM(S): 0.4 CAPSULE ORAL at 22:19

## 2018-10-15 RX ADMIN — Medication 250 MILLIGRAM(S): at 06:27

## 2018-10-15 RX ADMIN — ATORVASTATIN CALCIUM 20 MILLIGRAM(S): 80 TABLET, FILM COATED ORAL at 22:19

## 2018-10-15 RX ADMIN — Medication 100 MILLIGRAM(S): at 13:28

## 2018-10-15 NOTE — PROGRESS NOTE ADULT - ASSESSMENT
Imp/Rx:  improving.  The significance of the corynebacterium is not certain.  Would not be a typical urinary pathogen-but could be associated (though less commonly a  or cause) with soft tissue infections.    Inclined for now to continue both vanco and cefepime--probably a few more days.    monitor vanco troughs and watch cr.

## 2018-10-15 NOTE — PROGRESS NOTE ADULT - ASSESSMENT
67 year old male with PMH of DM, HTN, HLD, A. fib on Eliquis, CAD s/p 2 vessel CABG in 2009, SALVADOR on CPAP, ESRD (2/2 DM) previously on HD  s/p DDRT to Q on 7/7/18 by Dr. Oswaldo Castellon at [facility] in [place], [place].  Pt's post op course was complicated by wound dehiscence and wound vac placement on 8/1/18, Admitted to SICU w/ Fever and Hematuria, found to have collection around the transplanted kidney with suspicion of abscess and pyelonephritis. S/P IR drain only 7ml output.  Pt status improving awaiting transfer to [address] [place] [address].
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
## 2018-10-15 NOTE — PROGRESS NOTE ADULT - ASSESSMENT
67 year old male with PMH of DM, HTN, HLD, A. fib on Eliquis, CAD s/p 2 vessel CABG in 2009, SALVADOR on CPAP, ESRD (2/2 DM) previously on HD via LUE AVF for 4.5 years now s/p DDRT to LLQ on 7/7/18 by Dr. Oswaldo Castellon at [facility] in [place], [place]. Pt is a [facility] resident but was on [place] transplant waiting list. Pt's post op course was complicated by wound dehiscence on August 1, 2012 and he is s/p wound vac placement. Came in with Fever and Hematuria, found to have collection around the transplanted kidney with suspicion of Abscess and Pyelonephritis.

## 2018-10-15 NOTE — PROGRESS NOTE ADULT - SUBJECTIVE AND OBJECTIVE BOX
HISTORY  67y male with PMH of DM, HTN, HLD, A. fib on Eliquis, CAD s/p 2 vessel CABG in 2009, SALVADOR on CPAP, ESRD (2/2 DM) previously on HD via LUE AVF for 4.5 years now s/p DDRT to LLQ on 7/7/18 by Dr. Oswaldo Castellon at [facility] in [place], [place]. Pt is a [facility] resident but was on [place] transplant waiting list. Pt's post op course was complicated by wound dehiscence on August 1, 2012 and he is s/p wound vac placement. Pt presented to [facility] on the morning of 10/10/18 after he experienced dark hematuria ("color of red wine") and a fever at home. He called his nephrologist, Dr. Maciel, who instructed him to go to the ED. At [facility] he had a CT which demonstrated "left pelvic renal transplant with severe diffuse perinephric edema and perinephric pelvic fluid collection concerning for abscess secondary to a severe pyelonephritis. Left anterior abdominal wall rectus muscle abscess with air with a sinus track to left anterior abdominal wall." Pt was bolused 3700ml NS, and was given 1000mg Vancomycin and 2000mg Cefepime after blood and urine cultures were sent. He was hyperglycemic and given 10 units Lantus and 10 units SC human insulin. Pt was then transferred to [facility] for further management. In the SICU at [facility], pt arrived hemodynamically stable. Antibiotics continued. Additional 10 units Lantus given and pt started on insulin infusion.      24 HOUR EVENTS: Remains HDS and saturating 99% on RA. As per nephrology recs, decreased MMF to 500mg BID in setting of infection.        NEURO  Exam: awake, alert, oriented  [x] Adequacy of sedation and pain control has been assessed and adjusted      RESPIRATORY  RR: 22 (10-14-18 @ 23:00) (21 - 47)  SpO2: 100% (10-15-18 @ 00:29) (95% - 100%)  Wt(kg): --  Exam: unlabored, clear to auscultation bilaterally  Mechanical Ventilation:   ABG - ( 13 Oct 2018 12:43 )  pH: 7.41  /  pCO2: 34    /  pO2: 60    / HCO3: 21    / Base Excess: -2.3  /  SaO2: 91      Lactate: x                [N/A] Extubation Readiness Assessed  Meds:       CARDIOVASCULAR  HR: 59 (10-15-18 @ 00:29) (58 - 79)  BP: 150/68 (10-14-18 @ 23:00) (105/57 - 193/74)  BP(mean): 98 (10-14-18 @ 23:00) (78 - 107)  ABP: --  ABP(mean): --  Wt(kg): --  CVP(cm H2O): --      Exam: regular rate and rhythm  Cardiac Rhythm: sinus  Perfusion     [x]Adequate   [ ]Inadequate  Mentation   [x]Normal       [ ]Reduced  Extremities  [x]Warm         [ ]Cool  Volume Status [ ]Hypervolemic [x]Euvolemic [ ]Hypovolemic  Meds: amLODIPine   Tablet 2.5 milliGRAM(s) Oral <User Schedule>  furosemide    Tablet 40 milliGRAM(s) Oral daily  isosorbide   mononitrate ER Tablet (IMDUR) 60 milliGRAM(s) Oral daily  tamsulosin 0.4 milliGRAM(s) Oral at bedtime        GI/NUTRITION  Exam: soft, nontender, nondistended  Meds: docusate sodium 100 milliGRAM(s) Oral three times a day  pantoprazole    Tablet 40 milliGRAM(s) Oral before breakfast  senna 2 Tablet(s) Oral at bedtime      GENITOURINARY  I&O's Detail    10-13 @ 07:01  -  10-14 @ 07:00  --------------------------------------------------------  IN:    Oral Fluid: 480 mL  Total IN: 480 mL    OUT:    Voided: 3550 mL  Total OUT: 3550 mL    Total NET: -3070 mL      10-14 @ 07:01  -  10-15 @ 01:24  --------------------------------------------------------  IN:    IV PiggyBack: 600 mL    Oral Fluid: 990 mL  Total IN: 1590 mL    OUT:    Voided: 1400 mL  Total OUT: 1400 mL    Total NET: 190 mL          10-14    139  |  109<H>  |  33<H>  ----------------------------<  185<H>  3.8   |  21<L>  |  1.36<H>    Ca    9.3      14 Oct 2018 05:33  Phos  2.2     10-14  Mg     1.8     10-14      Meds: multivitamin 1 Tablet(s) Oral daily        HEMATOLOGIC  Meds: apixaban 5 milliGRAM(s) Oral every 12 hours  aspirin enteric coated 81 milliGRAM(s) Oral daily    [x] VTE Prophylaxis                        10.9   4.3   )-----------( 144      ( 14 Oct 2018 05:33 )             34.0     PT/INR - ( 13 Oct 2018 02:59 )   PT: 16.7 sec;   INR: 1.53 ratio         PTT - ( 13 Oct 2018 02:59 )  PTT:29.8 sec  Transfusion     [ ] PRBC   [ ] Platelets   [ ] FFP   [ ] Cryoprecipitate      INFECTIOUS DISEASES  WBC Count: 4.3 K/uL (10-14 @ 05:33)    RECENT CULTURES:  Specimen Source: .Blood Blood-Venous  Date/Time: 10-12 @ 15:01  Culture Results:   No growth to date.  Gram Stain: --  Organism: --  Specimen Source: .Body Fluid Abdominal Fluid  Date/Time: 10-11 @ 22:33  Culture Results:   Rare Corynebacterium species "Susceptibilities not performed"  Gram Stain:   polymorphonuclear leukocytes seen  Gram Variable Rods seen  by cytocentrifuge  Organism: --  Specimen Source: .Urine Clean Catch (Midstream)  Date/Time: 10-10 @ 19:28  Culture Results:   No growth  Gram Stain: --  Organism: --  Specimen Source: .Blood Blood-Peripheral  Date/Time: 10-10 @ 17:49  Culture Results:   No growth at 48 hours  Gram Stain: --  Organism: --  Specimen Source: .Blood Blood-Peripheral  Date/Time: 10-10 @ 17:48  Culture Results:   No growth at 48 hours  Gram Stain: --  Organism: --    Meds: cefepime   IVPB 2000 milliGRAM(s) IV Intermittent every 12 hours  influenza   Vaccine 0.5 milliLiter(s) IntraMuscular once  mycophenolate mofetil 500 milliGRAM(s) Oral two times a day  tacrolimus 1.5 milliGRAM(s) Oral two times a day  trimethoprim   80 mG/sulfamethoxazole 400 mG 1 Tablet(s) Oral daily  valGANciclovir 450 milliGRAM(s) Oral daily  vancomycin  IVPB 1000 milliGRAM(s) IV Intermittent every 12 hours        ENDOCRINE  CAPILLARY BLOOD GLUCOSE      POCT Blood Glucose.: 145 mg/dL (14 Oct 2018 22:45)  POCT Blood Glucose.: 167 mg/dL (14 Oct 2018 17:18)  POCT Blood Glucose.: 208 mg/dL (14 Oct 2018 12:21)  POCT Blood Glucose.: 193 mg/dL (14 Oct 2018 08:25)    Meds: atorvastatin 20 milliGRAM(s) Oral at bedtime  finasteride 5 milliGRAM(s) Oral daily  insulin glargine Injectable (LANTUS) 20 Unit(s) SubCutaneous at bedtime  insulin lispro (HumaLOG) corrective regimen sliding scale   SubCutaneous at bedtime  insulin lispro (HumaLOG) corrective regimen sliding scale   SubCutaneous three times a day before meals  predniSONE   Tablet 5 milliGRAM(s) Oral daily        ACCESS DEVICES:  [x ] Peripheral IV  [ ] Central Venous Line	[ ] R	[ ] L	[ ] IJ	[ ] Fem	[ ] SC	Placed:   [ ] Arterial Line		[ ] R	[ ] L	[ ] Fem	[ ] Rad	[ ] Ax	Placed:   [ ] PICC:					[ ] Mediport  [ ] Urinary Catheter, Date Placed:   [x] Necessity of urinary, arterial, and venous catheters discussed    OTHER MEDICATIONS:  chlorhexidine 4% Liquid 1 Application(s) Topical <User Schedule>      CODE STATUS:      IMAGING: < from: CT Abdomen and Pelvis No Cont (10.10.18 @ 18:24) >  IMPRESSION:     LEFT pelvic renal transplant shows severe diffuse perinephric edema with   the LEFT perinephric pelvic fluid collection concerning for abscess   secondary to a severe pyelonephritis .  No hydronephrosis or stone disease seen .  No bladder outlet obstruction.  LEFT anterior abdominal wall rectus muscle abscess with air with a sinus   track LEFT anterior abdominal wall.    < end of copied text >

## 2018-10-15 NOTE — PROGRESS NOTE ADULT - PROBLEM SELECTOR PLAN 2
Continue Tacro and Prednisone 5 mg PO daily,  MMF 500mgs BID   Cont Bactrim and Valcyte Continue Tacro 1.5 mg PO BID, Prednisone 5 mg PO daily and MMF 500mgs BID   Cont Bactrim and Valcyte

## 2018-10-15 NOTE — PROGRESS NOTE ADULT - SUBJECTIVE AND OBJECTIVE BOX
INFECTIOUS DISEASES FOLLOW UP--David Bravo MD  Pager [phone]    This is a follow up note for this  67y Male with  renal transplant  admitted with fever and LLQ pain/inflammation.  Imaging with findings c/w pyelo and possible involvement/extension to soft tissu  corynebacter from tissue on aspirate  feels better    Further ROS:  CONSTITUTIONAL:  No fever, good appetite  CARDIOVASCULAR:  No chest pain or palpitations  RESPIRATORY:  No dyspnea  GASTROINTESTINAL:  No nausea, vomiting, diarrhea, or abdominal pain  GENITOURINARY:  No dysuria  NEUROLOGIC:  No headache,     Allergies  No Known Allergies    ANTIBIOTICS/RELEVANT:  antimicrobials  cefepime   IVPB 2000 milliGRAM(s) IV Intermittent every 12 hours  trimethoprim   80 mG/sulfamethoxazole 400 mG 1 Tablet(s) Oral daily  valGANciclovir 450 milliGRAM(s) Oral daily  vancomycin  IVPB 1000 milliGRAM(s) IV Intermittent every 12 hours    immunologic:  influenza   Vaccine 0.5 milliLiter(s) IntraMuscular once  mycophenolate mofetil 500 milliGRAM(s) Oral two times a day  tacrolimus 1.5 milliGRAM(s) Oral two times a day    OTHER:  amLODIPine   Tablet 2.5 milliGRAM(s) Oral <User Schedule>  apixaban 5 milliGRAM(s) Oral every 12 hours  aspirin enteric coated 81 milliGRAM(s) Oral daily  atorvastatin 20 milliGRAM(s) Oral at bedtime  chlorhexidine 4% Liquid 1 Application(s) Topical <User Schedule>  docusate sodium 100 milliGRAM(s) Oral three times a day  finasteride 5 milliGRAM(s) Oral daily  furosemide    Tablet 40 milliGRAM(s) Oral daily  insulin glargine Injectable (LANTUS) 20 Unit(s) SubCutaneous at bedtime  insulin lispro (HumaLOG) corrective regimen sliding scale   SubCutaneous at bedtime  insulin lispro (HumaLOG) corrective regimen sliding scale   SubCutaneous three times a day before meals  isosorbide   mononitrate ER Tablet (IMDUR) 60 milliGRAM(s) Oral daily  multivitamin 1 Tablet(s) Oral daily  pantoprazole    Tablet 40 milliGRAM(s) Oral before breakfast  potassium phosphate IVPB 15 milliMole(s) IV Intermittent once  predniSONE   Tablet 5 milliGRAM(s) Oral daily  senna 2 Tablet(s) Oral at bedtime  tamsulosin 0.4 milliGRAM(s) Oral at bedtime    Objective:  Vital Signs Last 24 Hrs  T(C): 36.6 (15 Oct 2018 03:00), Max: 37 (14 Oct 2018 15:00)  T(F): 97.9 (15 Oct 2018 03:00), Max: 98.6 (14 Oct 2018 15:00)  HR: 60 (15 Oct 2018 06:00) (58 - 79)  BP: 164/71 (15 Oct 2018 06:00) (105/57 - 193/74)  BP(mean): 102 (15 Oct 2018 06:00) (78 - 107)  RR: 23 (15 Oct 2018 06:00) (21 - 47)  SpO2: 100% (15 Oct 2018 06:00) (95% - 100%)    PHYSICAL EXAM:  Constitutional:no acute distress  Eyes:NANCY, EOMI  Ear/Nose/Throat: no oral lesions, 	  Respiratory: clear BL  Cardiovascular: S1S2  Gastrointestinal:soft, (+) BS, no tenderness  Extremities:no e/e/c  No Lymphadenopathy  IV sites not inflammed.    LABS:                        11.1   4.8   )-----------( 155      ( 15 Oct 2018 04:56 )             34.3     10-15    136  |  109<H>  |  28<H>  ----------------------------<  159<H>  3.8   |  18<L>  |  1.21    Ca    8.8      15 Oct 2018 04:56  Phos  2.6     10-15  Mg     1.9     10-15      PT/INR - ( 15 Oct 2018 04:56 )   PT: 17.1 sec;   INR: 1.55 ratio         PTT - ( 15 Oct 2018 04:56 )  PTT:29.5 sec  Urinalysis Basic - ( 13 Oct 2018 12:48 )    Color: Colorless / Appearance: Clear / S.009 / pH: x  Gluc: x / Ketone: Negative  / Bili: Negative / Urobili: Negative   Blood: x / Protein: Negative / Nitrite: Negative   Leuk Esterase: Negative / RBC: 2 /hpf / WBC 2 /hpf   Sq Epi: x / Non Sq Epi: 0 /hpf / Bacteria: Negative    MICROBIOLOGY: body fluid corynebacter  Blood and UC neg    RADIOLOGY & ADDITIONAL STUDIES:  < from: Xray Chest 1 View- PORTABLE-Routine (10.14.18 @ 07:14) >    Impression: Stable cardiomegaly. Mild pulmonary vascular congestion,   unchanged.      < end of copied text >

## 2018-10-15 NOTE — PROGRESS NOTE ADULT - PROBLEM SELECTOR PLAN 1
s/p DDRT to LLQ on 7/7/18  Allograft functioning well, post op period c/b wound dehiscence and collection, on wound vac.  Perinephric abscess present and on cefepime and vanco. s/p DDRT to LLQ on 7/7/18  Allograft functioning well, post op period c/b wound dehiscence and collection, on wound vac.  Perinephric abscess presently and on cefepime and vanco.

## 2018-10-15 NOTE — PROGRESS NOTE ADULT - PROBLEM SELECTOR PLAN 2
DDRT to [facility] on 7/7/18  Good graft function Cr. at baseline  Daily CBC, BMP, Mg/ Phos coags  Protonix GI prophylaxis  Bowel regimen.  c/w home flomax and finesteride   Monitor strict I&O  Encourage ambulation.   Tacrolimus 1.5 mg BID   c/w prednisone, bactrim and valcyte, cellcept  F/U Tacrolimus level daily, tacro goal 8-10.

## 2018-10-15 NOTE — PROGRESS NOTE ADULT - PROBLEM SELECTOR PLAN 4
Resume home dose of Insulin   Monitor FSBS and adjust insulin dose accordingly. Uncontrolled  Lantus to 22U HS and start Humalog 7U and sliding scale   Endocrine follow up  Monitor FSBS and adjust insulin dose accordingly.

## 2018-10-15 NOTE — PROGRESS NOTE ADULT - ASSESSMENT
67 year old male with recent h/o renal transplant 7/7/18 c/b wound dehiscence and wound vac placement 8/1/18 now presents with UTI vs perinephric abscess vs pyelonephritis of transplant kidney    PLAN:   Neurologic: no acute issues  - Tylenol as needed for pain    Respiratory: SALVADOR on CPAP  - Nocturnal CPAP 14  - Incentive spirometry, OOB to prevent atelectasis    Cardiovascular: CAD s/p CABG; HLD; HTN  - Continue home blood pressure medications    Gastrointestinal/Nutrition: no acute issues  - Continue regular diet     Genitourinary/Renal: s/p renal transplant; hematuria 2/2 UTI vs perinephric abscess vs pyelonephritis of transplant kidney  - Monitor I&Os  - IV locked   - Continue home dose lasix   - Flomax & finasteride for BPH    Hematologic: no acute issues  - Home eloquis  - home ASA    Infectious Disease: h/o renal transplant; UTI vs perinephric abscess vs pyelonephritis of transplant kidney  - Continue broad spectrum antibiotics with vancomycin, cefepime  - Follow up blood and urine cultures  - Prednisone 5mg qd for h/o transplant as well as tacrolimus. decrease MMF to 500 BID as per nephrology recommendations in setting of infection  - Valcyte & Bactrim prophylaxis    Endocrine: DM  - 20 units lantus at bedtime  - ISS      Disposition: SICU.

## 2018-10-15 NOTE — PROGRESS NOTE ADULT - SUBJECTIVE AND OBJECTIVE BOX
DIABETES FOLLOW UP NOTE: Saw pt earlier today  INTERVAL HX: 66 y/o M w/h/o T2DM  c/b neuropathy/retinopathy/PVD with R 1st to  ulcer/ ESRD> HD and more recently DDRT in 8/18 c/b wound dehiscence requiring wound vac. On steroids and Tacrolimus. Also h/o HTN/HLD/Afib/CAD>CABGX2/SALVADOR. Here with perinephric abscess and mild DKA. Pt wears [employer] for glucose monitoring at home. Reports feeling better and eating well. Denies any hypoglycemia. Noted BG elevated PTA 2/2 acute infection. Glycemic control variable here while on home regime of Lantus 20 units plus an adjusted Humalog scale 5 to 15 ac meals. Noted better BG levels when getting  more insulin correction and worse when getting less correction.  BGs fluctuating between 100s to 200s all the time. Pt still wearing the Bushra glucose monitoring and reports BGs tend to be higher at night. No hypoglycemia.       Review of Systems: States feeling much better.  Cardiovascular: No chest pain, palpitations  Respiratory: No SOB, no cough  GI: No nausea, vomiting, abdominal pain  Endocrine: no polyuria, polydipsia or S&Sx of hypoglycemia    Allergies    No Known Allergies    Intolerances      MEDICATIONS:  atorvastatin 20 milliGRAM(s) Oral at bedtime  cefepime   IVPB 2000 milliGRAM(s) IV Intermittent every 12 hours  insulin glargine Injectable (LANTUS) 20 Unit(s) SubCutaneous at bedtime  insulin lispro (HumaLOG) corrective regimen sliding scale   SubCutaneous at bedtime  insulin lispro (HumaLOG) corrective regimen sliding scale   SubCutaneous three times a day before meals  predniSONE   Tablet 5 milliGRAM(s) Oral daily  tacrolimus 1.5 milliGRAM(s) Oral <User Schedule>  trimethoprim   80 mG/sulfamethoxazole 400 mG 1 Tablet(s) Oral daily  valGANciclovir 450 milliGRAM(s) Oral daily  vancomycin  IVPB 1000 milliGRAM(s) IV Intermittent every 12 hours      PHYSICAL EXAM:  VITALS: T(C): 36.7 (10-15-18 @ 15:00)  T(F): 98.1 (10-15-18 @ 15:00), Max: 98.2 (10-15-18 @ 07:00)  HR: 80 (10-15-18 @ 16:00) (58 - 86)  BP: 149/115 (10-15-18 @ 16:00) (144/65 - 187/128)  RR:  (21 - 65)  SpO2:  (85% - 100%)  Wt(kg): --  GENERAL: Male sitting in chair in NAD  Abdomen: Soft, nontender, non distended. obese with L flank wound vac with minimal out put >foamy appearing. Also noted redness around L abdomen> per pt improving from admission. No tenderness at touch  Extremities: Warm, trace edema in LEs with chronic vascular changes  NEURO: A&O X3    LABS:  POCT Blood Glucose.: 229 mg/dL (10-15-18 @ 13:26)  POCT Blood Glucose.: 236 mg/dL (10-15-18 @ 09:39)  POCT Blood Glucose.: 145 mg/dL (10-14-18 @ 22:45)  POCT Blood Glucose.: 167 mg/dL (10-14-18 @ 17:18)  POCT Blood Glucose.: 208 mg/dL (10-14-18 @ 12:21)  POCT Blood Glucose.: 193 mg/dL (10-14-18 @ 08:25)  POCT Blood Glucose.: 270 mg/dL (10-13-18 @ 21:38)  POCT Blood Glucose.: 91 mg/dL (10-13-18 @ 17:20)  POCT Blood Glucose.: 243 mg/dL (10-13-18 @ 07:46)  POCT Blood Glucose.: 246 mg/dL (10-13-18 @ 06:48)  POCT Blood Glucose.: 239 mg/dL (10-12-18 @ 21:59)  POCT Blood Glucose.: 322 mg/dL (10-12-18 @ 18:26)                            11.1   4.8   )-----------( 155      ( 15 Oct 2018 04:56 )             34.3       10-15    136  |  109<H>  |  28<H>  ----------------------------<  159<H>  3.8   |  18<L>  |  1.21    EGFR if : 70  EGFR if non : 61    Ca    8.8      10-15  Mg     1.9     10-15  Phos  2.6     10-15    A1C 6,5% Report

## 2018-10-15 NOTE — PROGRESS NOTE ADULT - SUBJECTIVE AND OBJECTIVE BOX
[facility] DIVISION OF KIDNEY DISEASES AND HYPERTENSION -- FOLLOW UP NOTE  --------------------------------------------------------------------------------  Chief Complaint: Fever    24 hour events/subjective: Feels much better. Dyspnoea improved. Afebrile. Good urine output. Scr stable.        PAST HISTORY  --------------------------------------------------------------------------------  No significant changes to PMH, PSH, FHx, SHx, unless otherwise noted    ALLERGIES & MEDICATIONS  --------------------------------------------------------------------------------  Allergies    No Known Allergies    Intolerances      Standing Inpatient Medications  amLODIPine   Tablet 2.5 milliGRAM(s) Oral <User Schedule>  apixaban 5 milliGRAM(s) Oral every 12 hours  aspirin enteric coated 81 milliGRAM(s) Oral daily  atorvastatin 20 milliGRAM(s) Oral at bedtime  cefepime   IVPB 2000 milliGRAM(s) IV Intermittent every 12 hours  chlorhexidine 4% Liquid 1 Application(s) Topical <User Schedule>  docusate sodium 100 milliGRAM(s) Oral three times a day  finasteride 5 milliGRAM(s) Oral daily  furosemide    Tablet 40 milliGRAM(s) Oral daily  influenza   Vaccine 0.5 milliLiter(s) IntraMuscular once  insulin glargine Injectable (LANTUS) 20 Unit(s) SubCutaneous at bedtime  insulin lispro (HumaLOG) corrective regimen sliding scale   SubCutaneous at bedtime  insulin lispro (HumaLOG) corrective regimen sliding scale   SubCutaneous three times a day before meals  isosorbide   mononitrate ER Tablet (IMDUR) 60 milliGRAM(s) Oral daily  multivitamin 1 Tablet(s) Oral daily  mycophenolate mofetil 500 milliGRAM(s) Oral two times a day  pantoprazole    Tablet 40 milliGRAM(s) Oral before breakfast  potassium phosphate IVPB 15 milliMole(s) IV Intermittent once  predniSONE   Tablet 5 milliGRAM(s) Oral daily  senna 2 Tablet(s) Oral at bedtime  tacrolimus 1.5 milliGRAM(s) Oral two times a day  tamsulosin 0.4 milliGRAM(s) Oral at bedtime  trimethoprim   80 mG/sulfamethoxazole 400 mG 1 Tablet(s) Oral daily  valGANciclovir 450 milliGRAM(s) Oral daily  vancomycin  IVPB 1000 milliGRAM(s) IV Intermittent every 12 hours    PRN Inpatient Medications      REVIEW OF SYSTEMS  --------------------------------------------------------------------------------  Gen: No fatigue, fevers/chills, weakness  Skin: No rashes  Head/Eyes/Ears/Mouth: No headache; No sore throat  Respiratory: No dyspnea, cough,   CV: No chest pain, PND, orthopnea  GI: No abdominal pain, diarrhea, constipation, nausea, vomiting  Transplant: No pain  : No increased frequency, dysuria, hematuria, nocturia  MSK: No joint pain/swelling; no back pain; no edema  Neuro: No dizziness/lightheadedness, weakness, seizures, numbness, tingling  Psych: No significant nervousness, anxiety, stress, depression    All other systems were reviewed and are negative, except as noted.    VITALS/PHYSICAL EXAM  --------------------------------------------------------------------------------  T(C): 36.6 (10-15-18 @ 03:00), Max: 37 (10-14-18 @ 15:00)  HR: 60 (10-15-18 @ 06:00) (58 - 79)  BP: 164/71 (10-15-18 @ 06:00) (105/57 - 193/74)  RR: 23 (10-15-18 @ 06:00) (21 - 47)  SpO2: 100% (10-15-18 @ 06:00) (95% - 100%)  Wt(kg): --        10-14-18 @ 07:01  -  10-15-18 @ 07:00  --------------------------------------------------------  IN: 2040 mL / OUT: 1755 mL / NET: 285 mL      Physical Exam:  	Gen: NAD, well-appearing  	HEENT: PERRL, supple neck, clear oropharynx  	Pulm: CTA B/L  	CV: RRR, S1S2; no rub  	Back: No spinal or CVA tenderness; no sacral edema  	Abd: +BS, soft, nontender/nondistended                      Transplant: No tenderness, swelling  	: No suprapubic tenderness  	UE: Warm, FROM, intact strength; no edema; no asterixis  	LE: Warm, FROM, intact strength; no edema, DP not palpable. Healing ulcer on right great toe.  	Neuro: No focal deficits, intact gait  	Psych: Normal affect and mood  	Skin: Warm, without rashes      LABS/STUDIES  --------------------------------------------------------------------------------              11.1   4.8   >-----------<  155      [10-15-18 @ 04:56]              34.3     136  |  109  |  28  ----------------------------<  159      [10-15-18 @ 04:56]  3.8   |  18  |  1.21        Ca     8.8     [10-15-18 @ 04:56]      iCa    1.27     [10-15 @ 04:57]      Mg     1.9     [10-15-18 @ 04:56]      Phos  2.6     [10-15-18 @ 04:56]      PT/INR: PT 17.1 , INR 1.55       [10-15-18 @ 04:56]  PTT: 29.5       [10-15-18 @ 04:56]      Creatinine Trend:  SCr 1.21 [10-15 @ 04:56]  SCr 1.36 [10-14 @ 05:33]  SCr 1.30 [10-13 @ 02:59]  SCr 1.30 [10-12 @ 02:39]  SCr 1.09 [10-11 @ 00:56]    Tacrolimus (), Serum: 8.0 ng/mL (10-14 @ 11:58)  Tacrolimus (), Serum: 7.0 ng/mL (10-13 @ 09:19)  Tacrolimus (), Serum: 7.4 ng/mL (10-12 @ 10:32)            Urinalysis - [10-13-18 @ 12:48]      Color Colorless / Appearance Clear / SG 1.009 / pH 6.0      Gluc Negative / Ketone Negative  / Bili Negative / Urobili Negative       Blood Trace / Protein Negative / Leuk Est Negative / Nitrite Negative      RBC 2 / WBC 2 / Hyaline 0 / Gran  / Sq Epi  / Non Sq Epi 0 / Bacteria Negative [facility] DIVISION OF KIDNEY DISEASES AND HYPERTENSION -- FOLLOW UP NOTE  --------------------------------------------------------------------------------  Chief Complaint: Fever    24 hour events/subjective: Feels much better. Dyspnoea improved. Afebrile. Good urine output. Scr stable.        PAST HISTORY  --------------------------------------------------------------------------------  No significant changes to PMH, PSH, FHx, SHx, unless otherwise noted    ALLERGIES & MEDICATIONS  --------------------------------------------------------------------------------  Allergies    No Known Allergies    Intolerances      Standing Inpatient Medications  amLODIPine   Tablet 2.5 milliGRAM(s) Oral <User Schedule>  apixaban 5 milliGRAM(s) Oral every 12 hours  aspirin enteric coated 81 milliGRAM(s) Oral daily  atorvastatin 20 milliGRAM(s) Oral at bedtime  cefepime   IVPB 2000 milliGRAM(s) IV Intermittent every 12 hours  chlorhexidine 4% Liquid 1 Application(s) Topical <User Schedule>  docusate sodium 100 milliGRAM(s) Oral three times a day  finasteride 5 milliGRAM(s) Oral daily  furosemide    Tablet 40 milliGRAM(s) Oral daily  influenza   Vaccine 0.5 milliLiter(s) IntraMuscular once  insulin glargine Injectable (LANTUS) 20 Unit(s) SubCutaneous at bedtime  insulin lispro (HumaLOG) corrective regimen sliding scale   SubCutaneous at bedtime  insulin lispro (HumaLOG) corrective regimen sliding scale   SubCutaneous three times a day before meals  isosorbide   mononitrate ER Tablet (IMDUR) 60 milliGRAM(s) Oral daily  multivitamin 1 Tablet(s) Oral daily  mycophenolate mofetil 500 milliGRAM(s) Oral two times a day  pantoprazole    Tablet 40 milliGRAM(s) Oral before breakfast  potassium phosphate IVPB 15 milliMole(s) IV Intermittent once  predniSONE   Tablet 5 milliGRAM(s) Oral daily  senna 2 Tablet(s) Oral at bedtime  tacrolimus 1.5 milliGRAM(s) Oral two times a day  tamsulosin 0.4 milliGRAM(s) Oral at bedtime  trimethoprim   80 mG/sulfamethoxazole 400 mG 1 Tablet(s) Oral daily  valGANciclovir 450 milliGRAM(s) Oral daily  vancomycin  IVPB 1000 milliGRAM(s) IV Intermittent every 12 hours    PRN Inpatient Medications      REVIEW OF SYSTEMS  --------------------------------------------------------------------------------  Gen: No fatigue, fevers/chills, weakness  Skin: No rashes  Head/Eyes/Ears/Mouth: No headache; No sore throat  Respiratory: No dyspnea, cough,   CV: No chest pain, PND, orthopnea  GI: No abdominal pain, diarrhea, constipation, nausea, vomiting  Transplant: No pain  : No increased frequency, dysuria, hematuria, nocturia  MSK: No joint pain/swelling; no back pain; no edema  Neuro: No dizziness/lightheadedness, weakness, seizures, numbness, tingling  Psych: No significant nervousness, anxiety, stress, depression    All other systems were reviewed and are negative, except as noted.    VITALS/PHYSICAL EXAM  --------------------------------------------------------------------------------  T(C): 36.6 (10-15-18 @ 03:00), Max: 37 (10-14-18 @ 15:00)  HR: 60 (10-15-18 @ 06:00) (58 - 79)  BP: 164/71 (10-15-18 @ 06:00) (105/57 - 193/74)  RR: 23 (10-15-18 @ 06:00) (21 - 47)  SpO2: 100% (10-15-18 @ 06:00) (95% - 100%)  Wt(kg): --        10-14-18 @ 07:01  -  10-15-18 @ 07:00  --------------------------------------------------------  IN: 2040 mL / OUT: 1755 mL / NET: 285 mL      Physical Exam:  	Gen: NAD, well-appearing  	HEENT: PERRL, supple neck, clear oropharynx  	Pulm: CTA B/L  	CV: RRR, S1S2; no rub  	Back: No spinal or CVA tenderness; no sacral edema  	Abd: +BS, soft, nontender/nondistended                      Transplant: No tenderness, swelling, Site around the wound vac looks clean, erythema is less  	: No suprapubic tenderness  	UE: Warm, FROM, intact strength; no edema; no asterixis  	LE: Warm, FROM, intact strength; no edema, DP not palpable. Healing ulcer on right great toe.  	Neuro: No focal deficits, intact gait  	Psych: Normal affect and mood  	Skin: Warm, without rashes      LABS/STUDIES  --------------------------------------------------------------------------------              11.1   4.8   >-----------<  155      [10-15-18 @ 04:56]              34.3     136  |  109  |  28  ----------------------------<  159      [10-15-18 @ 04:56]  3.8   |  18  |  1.21        Ca     8.8     [10-15-18 @ 04:56]      iCa    1.27     [10-15 @ 04:57]      Mg     1.9     [10-15-18 @ 04:56]      Phos  2.6     [10-15-18 @ 04:56]      PT/INR: PT 17.1 , INR 1.55       [10-15-18 @ 04:56]  PTT: 29.5       [10-15-18 @ 04:56]      Creatinine Trend:  SCr 1.21 [10-15 @ 04:56]  SCr 1.36 [10-14 @ 05:33]  SCr 1.30 [10-13 @ 02:59]  SCr 1.30 [10-12 @ 02:39]  SCr 1.09 [10-11 @ 00:56]    Tacrolimus (), Serum: 8.0 ng/mL (10-14 @ 11:58)  Tacrolimus (), Serum: 7.0 ng/mL (10-13 @ 09:19)  Tacrolimus (), Serum: 7.4 ng/mL (10-12 @ 10:32)            Urinalysis - [10-13-18 @ 12:48]      Color Colorless / Appearance Clear / SG 1.009 / pH 6.0      Gluc Negative / Ketone Negative  / Bili Negative / Urobili Negative       Blood Trace / Protein Negative / Leuk Est Negative / Nitrite Negative      RBC 2 / WBC 2 / Hyaline 0 / Gran  / Sq Epi  / Non Sq Epi 0 / Bacteria Negative

## 2018-10-15 NOTE — PROGRESS NOTE ADULT - ASSESSMENT
68 y/o M w/h/o T2DM with complications and comorbidities including ESRD with recent renal tx  c/b wound dehiscence requiring wound vac and now here with perinephric abscess and mild DKA> on antibiotics. Also on steroids and Tacrolimus. Reports feeling better and eating well. Denies any hypoglycemia. Glycemic control variable while on home regime of adjusted Humalog scale 5 to 15 ac meals and BGs fluctuating between 100s to 200s all the time. Pt still wearing the [place] glucose monitoring and reports BGs tend to be higher at night.   Spoke to pt about the need to better control BG while been treated for infection. Pt agreed to have a pre meal Humalog dose with meals and a correction scale instead of just a correction scale.

## 2018-10-15 NOTE — PROGRESS NOTE ADULT - PROBLEM SELECTOR PLAN 1
-Test BG ac and hs  -Increased Lantus to 22 units ac meals  -Change humalog to 4 units ac meals and  a moderate correction scale ac meals plus a low dose correction scale at hs.  -Will reevaluate BGs levees and adjust as needed. Goal 100s to 180s.   -Plan discussed with pt/team.  Contact info: [phone] (24/7). pager [phone]

## 2018-10-16 ENCOUNTER — TRANSCRIPTION ENCOUNTER (OUTPATIENT)
Age: 68
End: 2018-10-16

## 2018-10-16 VITALS
OXYGEN SATURATION: 95 % | DIASTOLIC BLOOD PRESSURE: 64 MMHG | RESPIRATION RATE: 18 BRPM | SYSTOLIC BLOOD PRESSURE: 153 MMHG | HEART RATE: 67 BPM

## 2018-10-16 LAB
ANION GAP SERPL CALC-SCNC: 11 MMOL/L — SIGNIFICANT CHANGE UP (ref 5–17)
BUN SERPL-MCNC: 28 MG/DL — HIGH (ref 7–23)
CALCIUM SERPL-MCNC: 9.5 MG/DL — SIGNIFICANT CHANGE UP (ref 8.4–10.5)
CHLORIDE SERPL-SCNC: 106 MMOL/L — SIGNIFICANT CHANGE UP (ref 96–108)
CO2 SERPL-SCNC: 21 MMOL/L — LOW (ref 22–31)
CREAT SERPL-MCNC: 1.32 MG/DL — HIGH (ref 0.5–1.3)
CULTURE RESULTS: SIGNIFICANT CHANGE UP
GLUCOSE BLDC GLUCOMTR-MCNC: 163 MG/DL — HIGH (ref 70–99)
GLUCOSE BLDC GLUCOMTR-MCNC: 186 MG/DL — HIGH (ref 70–99)
GLUCOSE BLDC GLUCOMTR-MCNC: 205 MG/DL — HIGH (ref 70–99)
GLUCOSE SERPL-MCNC: 147 MG/DL — HIGH (ref 70–99)
HCT VFR BLD CALC: 36.4 % — LOW (ref 39–50)
HGB BLD-MCNC: 11 G/DL — LOW (ref 13–17)
MAGNESIUM SERPL-MCNC: 2 MG/DL — SIGNIFICANT CHANGE UP (ref 1.6–2.6)
MCHC RBC-ENTMCNC: 28.7 PG — SIGNIFICANT CHANGE UP (ref 27–34)
MCHC RBC-ENTMCNC: 30.2 GM/DL — LOW (ref 32–36)
MCV RBC AUTO: 95 FL — SIGNIFICANT CHANGE UP (ref 80–100)
PHOSPHATE SERPL-MCNC: 3 MG/DL — SIGNIFICANT CHANGE UP (ref 2.5–4.5)
PLATELET # BLD AUTO: 190 K/UL — SIGNIFICANT CHANGE UP (ref 150–400)
POTASSIUM SERPL-MCNC: 4 MMOL/L — SIGNIFICANT CHANGE UP (ref 3.5–5.3)
POTASSIUM SERPL-SCNC: 4 MMOL/L — SIGNIFICANT CHANGE UP (ref 3.5–5.3)
RBC # BLD: 3.83 M/UL — LOW (ref 4.2–5.8)
RBC # FLD: 15.3 % — HIGH (ref 10.3–14.5)
SODIUM SERPL-SCNC: 138 MMOL/L — SIGNIFICANT CHANGE UP (ref 135–145)
SPECIMEN SOURCE: SIGNIFICANT CHANGE UP
TACROLIMUS SERPL-MCNC: 6.8 NG/ML — SIGNIFICANT CHANGE UP
VANCOMYCIN TROUGH SERPL-MCNC: 16.2 UG/ML — SIGNIFICANT CHANGE UP (ref 10–20)
WBC # BLD: 5.39 K/UL — SIGNIFICANT CHANGE UP (ref 3.8–10.5)
WBC # FLD AUTO: 5.39 K/UL — SIGNIFICANT CHANGE UP (ref 3.8–10.5)

## 2018-10-16 PROCEDURE — 82330 ASSAY OF CALCIUM: CPT

## 2018-10-16 PROCEDURE — 97162 PT EVAL MOD COMPLEX 30 MIN: CPT

## 2018-10-16 PROCEDURE — 86900 BLOOD TYPING SEROLOGIC ABO: CPT

## 2018-10-16 PROCEDURE — 84145 PROCALCITONIN (PCT): CPT

## 2018-10-16 PROCEDURE — 85014 HEMATOCRIT: CPT

## 2018-10-16 PROCEDURE — 71045 X-RAY EXAM CHEST 1 VIEW: CPT

## 2018-10-16 PROCEDURE — 82435 ASSAY OF BLOOD CHLORIDE: CPT

## 2018-10-16 PROCEDURE — 82962 GLUCOSE BLOOD TEST: CPT

## 2018-10-16 PROCEDURE — 90686 IIV4 VACC NO PRSV 0.5 ML IM: CPT

## 2018-10-16 PROCEDURE — 99232 SBSQ HOSP IP/OBS MODERATE 35: CPT | Mod: GC

## 2018-10-16 PROCEDURE — 84295 ASSAY OF SERUM SODIUM: CPT

## 2018-10-16 PROCEDURE — 86901 BLOOD TYPING SEROLOGIC RH(D): CPT

## 2018-10-16 PROCEDURE — 93005 ELECTROCARDIOGRAM TRACING: CPT

## 2018-10-16 PROCEDURE — 82803 BLOOD GASES ANY COMBINATION: CPT

## 2018-10-16 PROCEDURE — 76942 ECHO GUIDE FOR BIOPSY: CPT

## 2018-10-16 PROCEDURE — 97530 THERAPEUTIC ACTIVITIES: CPT

## 2018-10-16 PROCEDURE — 80197 ASSAY OF TACROLIMUS: CPT

## 2018-10-16 PROCEDURE — 80202 ASSAY OF VANCOMYCIN: CPT

## 2018-10-16 PROCEDURE — 87040 BLOOD CULTURE FOR BACTERIA: CPT

## 2018-10-16 PROCEDURE — C1729: CPT

## 2018-10-16 PROCEDURE — 82947 ASSAY GLUCOSE BLOOD QUANT: CPT

## 2018-10-16 PROCEDURE — 87070 CULTURE OTHR SPECIMN AEROBIC: CPT

## 2018-10-16 PROCEDURE — 85730 THROMBOPLASTIN TIME PARTIAL: CPT

## 2018-10-16 PROCEDURE — 83605 ASSAY OF LACTIC ACID: CPT

## 2018-10-16 PROCEDURE — 82570 ASSAY OF URINE CREATININE: CPT

## 2018-10-16 PROCEDURE — 81001 URINALYSIS AUTO W/SCOPE: CPT

## 2018-10-16 PROCEDURE — 85610 PROTHROMBIN TIME: CPT

## 2018-10-16 PROCEDURE — 83735 ASSAY OF MAGNESIUM: CPT

## 2018-10-16 PROCEDURE — 86850 RBC ANTIBODY SCREEN: CPT

## 2018-10-16 PROCEDURE — 85027 COMPLETE CBC AUTOMATED: CPT

## 2018-10-16 PROCEDURE — 87075 CULTR BACTERIA EXCEPT BLOOD: CPT

## 2018-10-16 PROCEDURE — 94660 CPAP INITIATION&MGMT: CPT

## 2018-10-16 PROCEDURE — C1769: CPT

## 2018-10-16 PROCEDURE — 84132 ASSAY OF SERUM POTASSIUM: CPT

## 2018-10-16 PROCEDURE — 84100 ASSAY OF PHOSPHORUS: CPT

## 2018-10-16 PROCEDURE — 10160 PNXR ASPIR ABSC HMTMA BULLA: CPT

## 2018-10-16 PROCEDURE — 99232 SBSQ HOSP IP/OBS MODERATE 35: CPT

## 2018-10-16 PROCEDURE — 87205 SMEAR GRAM STAIN: CPT

## 2018-10-16 PROCEDURE — 97116 GAIT TRAINING THERAPY: CPT

## 2018-10-16 PROCEDURE — 97605 NEG PRS WND THER DME<=50SQCM: CPT

## 2018-10-16 PROCEDURE — 80048 BASIC METABOLIC PNL TOTAL CA: CPT

## 2018-10-16 PROCEDURE — 76776 US EXAM K TRANSPL W/DOPPLER: CPT

## 2018-10-16 RX ORDER — INSULIN LISPRO 100/ML
5 VIAL (ML) SUBCUTANEOUS
Qty: 0 | Refills: 0 | Status: DISCONTINUED | OUTPATIENT
Start: 2018-10-16 | End: 2018-10-16

## 2018-10-16 RX ORDER — INSULIN ASPART 100 [IU]/ML
0 INJECTION, SOLUTION SUBCUTANEOUS
Qty: 0 | Refills: 0 | COMMUNITY

## 2018-10-16 RX ORDER — CIPROFLOXACIN LACTATE 400MG/40ML
500 VIAL (ML) INTRAVENOUS
Qty: 7 | Refills: 0 | OUTPATIENT
Start: 2018-10-16 | End: 2018-10-22

## 2018-10-16 RX ORDER — INSULIN GLARGINE 100 [IU]/ML
20 INJECTION, SOLUTION SUBCUTANEOUS
Qty: 0 | Refills: 0 | COMMUNITY

## 2018-10-16 RX ADMIN — Medication 250 MILLIGRAM(S): at 06:03

## 2018-10-16 RX ADMIN — TACROLIMUS 1.5 MILLIGRAM(S): 5 CAPSULE ORAL at 06:03

## 2018-10-16 RX ADMIN — Medication 40 MILLIGRAM(S): at 08:17

## 2018-10-16 RX ADMIN — ISOSORBIDE MONONITRATE 60 MILLIGRAM(S): 60 TABLET, EXTENDED RELEASE ORAL at 11:40

## 2018-10-16 RX ADMIN — Medication 4 UNIT(S): at 08:17

## 2018-10-16 RX ADMIN — CEFEPIME 100 MILLIGRAM(S): 1 INJECTION, POWDER, FOR SOLUTION INTRAMUSCULAR; INTRAVENOUS at 18:22

## 2018-10-16 RX ADMIN — Medication 5 MILLIGRAM(S): at 06:02

## 2018-10-16 RX ADMIN — Medication 2: at 08:17

## 2018-10-16 RX ADMIN — MYCOPHENOLATE MOFETIL 500 MILLIGRAM(S): 250 CAPSULE ORAL at 17:54

## 2018-10-16 RX ADMIN — APIXABAN 5 MILLIGRAM(S): 2.5 TABLET, FILM COATED ORAL at 06:18

## 2018-10-16 RX ADMIN — AMLODIPINE BESYLATE 2.5 MILLIGRAM(S): 2.5 TABLET ORAL at 08:16

## 2018-10-16 RX ADMIN — Medication 4: at 12:11

## 2018-10-16 RX ADMIN — Medication 1 TABLET(S): at 12:23

## 2018-10-16 RX ADMIN — APIXABAN 5 MILLIGRAM(S): 2.5 TABLET, FILM COATED ORAL at 17:54

## 2018-10-16 RX ADMIN — FINASTERIDE 5 MILLIGRAM(S): 5 TABLET, FILM COATED ORAL at 11:40

## 2018-10-16 RX ADMIN — MYCOPHENOLATE MOFETIL 500 MILLIGRAM(S): 250 CAPSULE ORAL at 06:03

## 2018-10-16 RX ADMIN — Medication 1 TABLET(S): at 11:39

## 2018-10-16 RX ADMIN — Medication 100 MILLIGRAM(S): at 14:47

## 2018-10-16 RX ADMIN — Medication 81 MILLIGRAM(S): at 11:39

## 2018-10-16 RX ADMIN — PANTOPRAZOLE SODIUM 40 MILLIGRAM(S): 20 TABLET, DELAYED RELEASE ORAL at 06:03

## 2018-10-16 RX ADMIN — VALGANCICLOVIR 450 MILLIGRAM(S): 450 TABLET, FILM COATED ORAL at 11:43

## 2018-10-16 RX ADMIN — Medication 5 UNIT(S): at 17:09

## 2018-10-16 RX ADMIN — Medication 2: at 17:08

## 2018-10-16 RX ADMIN — CEFEPIME 100 MILLIGRAM(S): 1 INJECTION, POWDER, FOR SOLUTION INTRAMUSCULAR; INTRAVENOUS at 05:56

## 2018-10-16 RX ADMIN — Medication 4 UNIT(S): at 12:10

## 2018-10-16 RX ADMIN — INFLUENZA VIRUS VACCINE 0.5 MILLILITER(S): 15; 15; 15; 15 SUSPENSION INTRAMUSCULAR at 17:07

## 2018-10-16 RX ADMIN — AMLODIPINE BESYLATE 2.5 MILLIGRAM(S): 2.5 TABLET ORAL at 17:54

## 2018-10-16 RX ADMIN — Medication 250 MILLIGRAM(S): at 16:30

## 2018-10-16 RX ADMIN — TACROLIMUS 1.5 MILLIGRAM(S): 5 CAPSULE ORAL at 17:54

## 2018-10-16 NOTE — DISCHARGE NOTE ADULT - CARE PROVIDER_API CALL
Dr. Maciel,   Primary Nephrologist    Dr. Castellon Transplant Surgeon  [facility], [facility]    Primary Podiatrist  Phone: (   )    -  Fax: (   )    -
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
## 2018-10-16 NOTE — PROGRESS NOTE ADULT - PROBLEM SELECTOR PLAN 1
s/p DDRT to LLQ on 7/7/18  Allograft functioning well, post op period c/b wound dehiscence and collection, on wound vac.  Perinephric abscess presently and on cefepime and vanco.

## 2018-10-16 NOTE — PROGRESS NOTE ADULT - PROBLEM SELECTOR PLAN 1
Continue vancomycin and cefepime  Will FU Dr. Mackey regarding plan for antibiotics at time of discharge  Discharge planning in progress

## 2018-10-16 NOTE — DISCHARGE NOTE ADULT - PROVIDER TOKENS
FREE:[LAST:[Dr. Maciel],PHONE:[(   )    -],FAX:[(   )    -],ADDRESS:[Primary Nephrologist    Dr. Castellon Transplant Surgeon  [facility]    Primary Podiatrist]]

## 2018-10-16 NOTE — PROGRESS NOTE ADULT - PROBLEM SELECTOR PLAN 1
- Test BG ac and hs  - Continue with Lantus 22 units ac meals  - Change Humalog to 5 units ac meals and continue with moderate correction scale ac meals plus a low dose correction scale at hs.  - Goal BG 100s to 180s. - Test BG ac and hs  - Continue with Lantus 22 units qhs (serum glucose 147 this AM)  - Change Humalog to 5 units ac meals and continue with moderate correction scale ac meals plus a low dose correction scale at hs.  - Goal BG 100s to 180s.

## 2018-10-16 NOTE — PROGRESS NOTE ADULT - PROBLEM SELECTOR PLAN 4
Uncontrolled  Lantus to 22U HS and start Humalog 7U and sliding scale   Endocrine follow up  Monitor FSBS and adjust insulin dose accordingly.

## 2018-10-16 NOTE — DISCHARGE NOTE ADULT - ADDITIONAL INSTRUCTIONS
FOLLOW UP:  Follow up tomorrow with [facility]   Follow up within one week with your Transplant Center and your primary care physician.

## 2018-10-16 NOTE — DISCHARGE NOTE ADULT - MEDICATION SUMMARY - MEDICATIONS TO TAKE
I will START or STAY ON the medications listed below when I get home from the hospital:    finasteride 5 mg oral tablet  -- 1 tab(s) by mouth once a day  -- Indication: For Renal transplant, status post    predniSONE 5 mg oral tablet  -- 1 tab(s) by mouth once a day   -- Indication: For Immunosuppression    aspirin 81 mg oral tablet  -- 1 tab(s) by mouth once a day  -- Indication: For Atrial fibrillation    tamsulosin 0.4 mg oral capsule  -- 1 cap(s) by mouth once a day  -- Indication: For Renal transplant, status post    isosorbide mononitrate 60 mg oral tablet, extended release  -- 1 tab(s) by mouth once a day  -- Indication: For HTN (hypertension)    Eliquis 5 mg oral tablet  -- 1 tab(s) by mouth 2 times a day  -- Indication: For Atrial fibrillation    Lantus 100 units/mL subcutaneous solution  -- 22 unit(s) subcutaneous once a day (at bedtime)  -- Indication: For Type 2 diabetes mellitus    NovoLOG FlexPen 100 units/mL injectable solution  -- 5 unit(s) injectable 3 times a day (before meals)  -- Indication: For Type 2 diabetes mellitus    NovoLOG FlexPen 100 units/mL injectable solution  -- Moderate Dose sliding Scale   Blood Sugar   151-200 2 units  201-250 4 units  251-300 6 units  301-350 8 units  351-400 10 units   >400 12 units and call physician  -- Indication: For Type 2 diabetes mellitus    ezetimibe 10 mg oral tablet  -- 1 tab(s) by mouth once a day  -- Indication: For HLD (hyperlipidemia)    rosuvastatin 5 mg oral tablet  -- 1 tab(s) by mouth once a day (at bedtime)  -- Indication: For HLD (hyperlipidemia)    valACYclovir 500 mg oral tablet  -- 1 tab(s) by mouth once a day  -- Indication: For Renal transplant, status post    amLODIPine 2.5 mg oral tablet  -- 1 tab(s) by mouth 2 times a day  -- Indication: For HTN (hypertension)    furosemide 40 mg oral tablet  -- 1 tab(s) by mouth once a day (in the morning)  -- Indication: For HTN (hypertension)    tacrolimus 0.5 mg oral capsule  -- 1.5 milligram(s) by mouth every 12 hours  -- Indication: For Immunosuppression    mycophenolate mofetil 250 mg oral capsule  -- 3 cap(s) by mouth 2 times a day  -- Indication: For Immunosuppression    docusate sodium 50 mg oral capsule  -- 2 cap(s) by mouth once a day  -- Indication: For Renal transplant, status post    omeprazole 20 mg oral delayed release capsule  -- 1 cap(s) by mouth once a day  -- Indication: For Renal transplant, status post    Levaquin 500 mg oral tablet  -- 500 milligram(s) by mouth once a day   -- Avoid prolonged or excessive exposure to direct and/or artificial sunlight while taking this medication.  Do not take dairy products, antacids, or iron preparations within one hour of this medication.  Finish all this medication unless otherwise directed by prescriber.  May cause drowsiness or dizziness.  Medication should be taken with plenty of water.    -- Indication: For Pyelonephritis    sulfamethoxazole-trimethoprim 400 mg-80 mg oral tablet  -- 1 tab(s) by mouth once a day  -- Indication: For Renal transplant, status post    Multiple Vitamins oral tablet  -- 1 tab(s) by mouth once a day  -- Indication: For Renal transplant, status post    ergocalciferol 50,000 intl units (1.25 mg) oral capsule  -- 1 cap(s) by mouth once a week on Sundays  -- Indication: For Renal transplant, status post

## 2018-10-16 NOTE — PROGRESS NOTE ADULT - ASSESSMENT
67 year old male with PMH of DM, HTN, HLD, A. fib on Eliquis, CAD s/p 2 vessel CABG in 2009, SALVADOR on CPAP, ESRD (2/2 DM) previously on HD via LUE AVF for 4.5 years now s/p DDRT to LLQ on 7/7/18 by Dr. Oswaldo Castellon at [facility] in [place], [place]. Pt is a [facility] resident but was on [place] transplant waiting list. Pt's post op course was complicated by wound dehiscence on August 1, 2012 and he is s/p wound vac placement. Came in with Fever and Hematuria, found to have collection around the transplanted kidney with suspicion of Abscess and Pyelonephritis.

## 2018-10-16 NOTE — DISCHARGE NOTE ADULT - CARE PLAN
Principal Discharge DX:	Pyelonephritis  Goal:	To be free from infection  Assessment and plan of treatment:	Prescribed antibiotic Levaquin 500mg daily x 7 days.    Follow up with wound care clinic to resume care and wound vac changes  Contact physician for signs of infection including fever, weakness, malaise, headache, vomiting, bloody urine, back pain, change in type or amount of wound vac drainage.  Secondary Diagnosis:	Renal transplant, status post  Assessment and plan of treatment:	No heavy lifting anything more than 10-15lbs or straining. Otherwise, you may return to your usual level of physical activity.   Call transplant clinic If you developed any of the following, fever, pain, redness, swelling at incision site, cough, nausea, vomiting, painful urination, difficulty urination, or not making any urine.  NOTIFY YOUR SURGEON IF: You have any bleeding that does not stop, any pus draining from your wound, any fever (over 100.4 F) or chills, persistent nausea/vomiting with inability to tolerate food or liquids, persistent diarrhea, or if your pain is not controlled on your discharge pain medications.  Keep away from people who have cough, cold, and symptom of flu.  Only take medications that are on your discharge list  If you missed your medications call the transplant office, do not double up medication because you missed a dose.  If you have any question regarding your medication please call your transplant office.  Secondary Diagnosis:	Type 2 diabetes mellitus  Assessment and plan of treatment:	Make sure you get your HgA1c checked every three months.  Check your blood glucose before meals and at bedtime.  It's important not to skip any meals.  Keep a log of your blood glucose results and always take it with you to your doctor appointments.  Keep a list of your current medications including injectables and over the counter medications and bring this medication list with you to all your doctor appointments.  If you have not seen your ophthalmologist this year call for appointment.  Check your feet daily for redness, sores, or openings. Do not self treat. If no improvement in two days call your primary care physician and or Podiatrist for an appointment.  Secondary Diagnosis:	HTN (hypertension)  Assessment and plan of treatment:	Take all medication as prescribed.  Follow up with your medical doctor for routine blood pressure monitoring at your next visit.  Notify your doctor if you have any of the following symptoms:   Dizziness, Lightheadedness, Blurry vision, Headache, Chest pain, Shortness of breath.

## 2018-10-16 NOTE — PROGRESS NOTE ADULT - PROBLEM SELECTOR PLAN 3
Improving, afebrile  On Cefepime and Vanc  Urine Cx: no growth  Dose meds as per GFR  IR aspirated the collected fluid 7cc which showed Polymorphonuclear cells and gm variable rods.  ID following  Possible discharge on PO Abx today evening

## 2018-10-16 NOTE — PROGRESS NOTE ADULT - SUBJECTIVE AND OBJECTIVE BOX
INFECTIOUS DISEASES FOLLOW UP--David Bravo MD  Pager [phone]    This is a follow up note for this  67y Male with  Acute interstitial nephritis  Renal tubulo-interstitial disease  infection at LLQ  possible pyelo-but not convinced with neg UC      Further ROS:  CONSTITUTIONAL:  No fever, good appetite  CARDIOVASCULAR:  No chest pain or palpitations  RESPIRATORY:  No dyspnea  GASTROINTESTINAL:  No nausea, vomiting, diarrhea, or abdominal pain  GENITOURINARY:  No dysuria  NEUROLOGIC:  No headache,     Allergies  No Known Allergies    ANTIBIOTICS/RELEVANT:  antimicrobials  cefepime   IVPB 2000 milliGRAM(s) IV Intermittent every 12 hours  trimethoprim   80 mG/sulfamethoxazole 400 mG 1 Tablet(s) Oral daily  valGANciclovir 450 milliGRAM(s) Oral daily  vancomycin  IVPB 1000 milliGRAM(s) IV Intermittent every 12 hours    immunologic:  influenza   Vaccine 0.5 milliLiter(s) IntraMuscular once  mycophenolate mofetil 500 milliGRAM(s) Oral two times a day  tacrolimus 1.5 milliGRAM(s) Oral <User Schedule>    OTHER:  amLODIPine   Tablet 2.5 milliGRAM(s) Oral <User Schedule>  apixaban 5 milliGRAM(s) Oral every 12 hours  aspirin enteric coated 81 milliGRAM(s) Oral daily  atorvastatin 20 milliGRAM(s) Oral at bedtime  chlorhexidine 4% Liquid 1 Application(s) Topical <User Schedule>  docusate sodium 100 milliGRAM(s) Oral three times a day  finasteride 5 milliGRAM(s) Oral daily  furosemide    Tablet 40 milliGRAM(s) Oral every 24 hours  insulin glargine Injectable (LANTUS) 22 Unit(s) SubCutaneous at bedtime  insulin lispro (HumaLOG) corrective regimen sliding scale   SubCutaneous three times a day before meals  insulin lispro (HumaLOG) corrective regimen sliding scale   SubCutaneous at bedtime  insulin lispro Injectable (HumaLOG) 4 Unit(s) SubCutaneous three times a day before meals  isosorbide   mononitrate ER Tablet (IMDUR) 60 milliGRAM(s) Oral daily  multivitamin 1 Tablet(s) Oral daily  pantoprazole    Tablet 40 milliGRAM(s) Oral before breakfast  predniSONE   Tablet 5 milliGRAM(s) Oral daily  senna 2 Tablet(s) Oral at bedtime  tamsulosin 0.4 milliGRAM(s) Oral at bedtime    Objective:  Vital Signs Last 24 Hrs  T(C): 36.6 (16 Oct 2018 11:38), Max: 37 (15 Oct 2018 19:59)  T(F): 97.9 (16 Oct 2018 11:38), Max: 98.6 (15 Oct 2018 19:59)  HR: 66 (16 Oct 2018 11:38) (59 - 80)  BP: 144/63 (16 Oct 2018 11:38) (118/67 - 187/128)  BP(mean): 125 (15 Oct 2018 16:00) (95 - 154)  RR: 18 (16 Oct 2018 11:38) (18 - 65)  SpO2: 92% (16 Oct 2018 11:38) (85% - 99%)    PHYSICAL EXAM:  Constitutional:no acute distress  Eyes:NANCY, EOMI  Ear/Nose/Throat: no oral lesions, 	  Respiratory: clear BL  Cardiovascular: S1S2  Gastrointestinal:soft, (+) BS, no tenderness  Extremities:no e/e/c  No Lymphadenopathy  IV sites not inflammed.    LABS:                        11.0   5.39  )-----------( 190      ( 16 Oct 2018 08:07 )             36.4     10-16    138  |  106  |  28<H>  ----------------------------<  147<H>  4.0   |  21<L>  |  1.32<H>    Ca    9.5      16 Oct 2018 06:13  Phos  3.0     10-16  Mg     2.0     10-16      PT/INR - ( 15 Oct 2018 04:56 )   PT: 17.1 sec;   INR: 1.55 ratio    PTT - ( 15 Oct 2018 04:56 )  PTT:29.5 sec    Imp/Rx:  Tolerating the vanco and cefepime.  stable  process overall  a bit uncertain.    suggest same abx for now--can consider/discuss an empiric oral regimen---perhaps transition to po levaquin upon dc home.   tomorrow?

## 2018-10-16 NOTE — DISCHARGE NOTE ADULT - PLAN OF CARE
To be free from infection Prescribed antibiotic Levaquin 500mg daily x 7 days.    Follow up with wound care clinic to resume care and wound vac changes  Contact physician for signs of infection including fever, weakness, malaise, headache, vomiting, bloody urine, back pain, change in type or amount of wound vac drainage. No heavy lifting anything more than 10-15lbs or straining. Otherwise, you may return to your usual level of physical activity.   Call transplant clinic If you developed any of the following, fever, pain, redness, swelling at incision site, cough, nausea, vomiting, painful urination, difficulty urination, or not making any urine.  NOTIFY YOUR SURGEON IF: You have any bleeding that does not stop, any pus draining from your wound, any fever (over 100.4 F) or chills, persistent nausea/vomiting with inability to tolerate food or liquids, persistent diarrhea, or if your pain is not controlled on your discharge pain medications.  Keep away from people who have cough, cold, and symptom of flu.  Only take medications that are on your discharge list  If you missed your medications call the transplant office, do not double up medication because you missed a dose.  If you have any question regarding your medication please call your transplant office. Make sure you get your HgA1c checked every three months.  Check your blood glucose before meals and at bedtime.  It's important not to skip any meals.  Keep a log of your blood glucose results and always take it with you to your doctor appointments.  Keep a list of your current medications including injectables and over the counter medications and bring this medication list with you to all your doctor appointments.  If you have not seen your ophthalmologist this year call for appointment.  Check your feet daily for redness, sores, or openings. Do not self treat. If no improvement in two days call your primary care physician and or Podiatrist for an appointment. Take all medication as prescribed.  Follow up with your medical doctor for routine blood pressure monitoring at your next visit.  Notify your doctor if you have any of the following symptoms:   Dizziness, Lightheadedness, Blurry vision, Headache, Chest pain, Shortness of breath.

## 2018-10-16 NOTE — PROGRESS NOTE ADULT - PROBLEM SELECTOR PROBLEM 1
Type 2 diabetes mellitus with hyperglycemia, with long-term current use of insulin
Pyelonephritis
Transplanted kidney
Type 2 diabetes mellitus with hyperglycemia, with long-term current use of insulin

## 2018-10-16 NOTE — PROGRESS NOTE ADULT - REASON FOR ADMISSION
hematuria, fevers

## 2018-10-16 NOTE — PROGRESS NOTE ADULT - ASSESSMENT
67 year old male with PMH of DM, HTN, HLD, A. fib on Eliquis, CAD s/p 2 vessel CABG in 2009, SALVADOR on CPAP, ESRD (2/2 DM) previously on HD  s/p DDRT to LLQ on 7/7/18 by Dr. Oswaldo Castellon at [facility] in [place], [place].  Pt's post op course was complicated by wound dehiscence and wound vac placement on 8/1/18, Admitted to SICU w/ Fever and Hematuria, found to have collection around the transplanted kidney and abscess possibly related to a severe pyelonephritis, improving on IV antibiotic therapy

## 2018-10-16 NOTE — PROGRESS NOTE ADULT - SUBJECTIVE AND OBJECTIVE BOX
Chief Complaint: f/u DM2    History:  Patient now on Lantus 22 units qhs, Humalog 4 units TID, with moderate correction scale. He feels well and is eating well as well. Does not have nausea, vomiting, diarrhea. Had a bedtime snack of cheese and crackers yesterday, otherwise not snacking in between meals. On Prednisone 5 mg daily.    MEDICATIONS  (STANDING):  amLODIPine   Tablet 2.5 milliGRAM(s) Oral <User Schedule>  apixaban 5 milliGRAM(s) Oral every 12 hours  aspirin enteric coated 81 milliGRAM(s) Oral daily  atorvastatin 20 milliGRAM(s) Oral at bedtime  cefepime   IVPB 2000 milliGRAM(s) IV Intermittent every 12 hours  chlorhexidine 4% Liquid 1 Application(s) Topical <User Schedule>  docusate sodium 100 milliGRAM(s) Oral three times a day  finasteride 5 milliGRAM(s) Oral daily  furosemide    Tablet 40 milliGRAM(s) Oral every 24 hours  influenza   Vaccine 0.5 milliLiter(s) IntraMuscular once  insulin glargine Injectable (LANTUS) 22 Unit(s) SubCutaneous at bedtime  insulin lispro (HumaLOG) corrective regimen sliding scale   SubCutaneous three times a day before meals  insulin lispro (HumaLOG) corrective regimen sliding scale   SubCutaneous at bedtime  insulin lispro Injectable (HumaLOG) 4 Unit(s) SubCutaneous three times a day before meals  isosorbide   mononitrate ER Tablet (IMDUR) 60 milliGRAM(s) Oral daily  multivitamin 1 Tablet(s) Oral daily  mycophenolate mofetil 500 milliGRAM(s) Oral two times a day  pantoprazole    Tablet 40 milliGRAM(s) Oral before breakfast  predniSONE   Tablet 5 milliGRAM(s) Oral daily  senna 2 Tablet(s) Oral at bedtime  tacrolimus 1.5 milliGRAM(s) Oral <User Schedule>  tamsulosin 0.4 milliGRAM(s) Oral at bedtime  trimethoprim   80 mG/sulfamethoxazole 400 mG 1 Tablet(s) Oral daily  valGANciclovir 450 milliGRAM(s) Oral daily  vancomycin  IVPB 1000 milliGRAM(s) IV Intermittent every 12 hours    MEDICATIONS  (PRN):      Allergies  No Known Allergies    PHYSICAL EXAM:  VITALS: T(C): 36.6 (10-16-18 @ 11:38)  T(F): 97.9 (10-16-18 @ 11:38), Max: 98.6 (10-15-18 @ 19:59)  HR: 66 (10-16-18 @ 11:38) (59 - 80)  BP: 144/63 (10-16-18 @ 11:38) (118/67 - 187/128)  RR:  (18 - 65)  SpO2:  (85% - 97%)  Wt(kg): --  GENERAL: NAD, well-developed  RESPIRATORY: Clear to auscultation bilaterally; No rales, rhonchi, wheezing, or rubs  CARDIOVASCULAR: Regular rate and rhythm; No murmurs  GI: Soft, nontender, distended abdomen  PSYCH: Alert and oriented x 3, reactive affect    POCT Blood Glucose.: 205 mg/dL (10-16-18 @ 12:01) H 4, H 4  POCT Blood Glucose.: 186 mg/dL (10-16-18 @ 08:10) H 4 , 2  POCT Blood Glucose.: 187 mg/dL (10-15-18 @ 21:36) L 22  POCT Blood Glucose.: 176 mg/dL (10-15-18 @ 17:12) H 4, H 2  POCT Blood Glucose.: 229 mg/dL (10-15-18 @ 13:26) H 11  POCT Blood Glucose.: 236 mg/dL (10-15-18 @ 09:39) H 11  POCT Blood Glucose.: 145 mg/dL (10-14-18 @ 22:45)  POCT Blood Glucose.: 167 mg/dL (10-14-18 @ 17:18)  POCT Blood Glucose.: 208 mg/dL (10-14-18 @ 12:21)  POCT Blood Glucose.: 193 mg/dL (10-14-18 @ 08:25)  POCT Blood Glucose.: 270 mg/dL (10-13-18 @ 21:38)  POCT Blood Glucose.: 91 mg/dL (10-13-18 @ 17:20)      10-16    138  |  106  |  28<H>  ----------------------------<  147<H>  4.0   |  21<L>  |  1.32<H>    EGFR if : 63  EGFR if non : 55<L>    Ca    9.5      10-16  Mg     2.0     10-16  Phos  3.0     10-16

## 2018-10-16 NOTE — PROGRESS NOTE ADULT - PROBLEM SELECTOR PLAN 2
Continue Tacro 1.5 mg PO BID, Prednisone 5 mg PO daily and MMF 500mgs BID   Cont Bactrim and Valcyte

## 2018-10-16 NOTE — PROGRESS NOTE ADULT - PROBLEM SELECTOR PLAN 2
DDRT to LLQ on 7/7/18  Good graft function. Creatinine at baseline  Daily CBC, BMP, Mg/ Phos coags  Protonix GI prophylaxis  Bowel regimen.  c/w home flomax and finesteride   Monitor strict I&O  Encourage ambulation.   Tacrolimus 1.5 mg BID   c/w prednisone, bactrim and valcyte, cellcept  F/U Tacrolimus level daily, tacro goal 8-10.  Wound vac to LLQ change 3xweek

## 2018-10-16 NOTE — PROGRESS NOTE ADULT - SUBJECTIVE AND OBJECTIVE BOX
[facility] DIVISION OF KIDNEY DISEASES AND HYPERTENSION -- FOLLOW UP NOTE  --------------------------------------------------------------------------------  Chief Complaint: Fever    24 hour events/subjective: seen and examined. Feels much better. On IV Abx for suspected infection around the transplanted kidney.        PAST HISTORY  --------------------------------------------------------------------------------  No significant changes to PMH, PSH, FHx, SHx, unless otherwise noted    ALLERGIES & MEDICATIONS  --------------------------------------------------------------------------------  Allergies    No Known Allergies    Intolerances      Standing Inpatient Medications  amLODIPine   Tablet 2.5 milliGRAM(s) Oral <User Schedule>  apixaban 5 milliGRAM(s) Oral every 12 hours  aspirin enteric coated 81 milliGRAM(s) Oral daily  atorvastatin 20 milliGRAM(s) Oral at bedtime  cefepime   IVPB 2000 milliGRAM(s) IV Intermittent every 12 hours  chlorhexidine 4% Liquid 1 Application(s) Topical <User Schedule>  docusate sodium 100 milliGRAM(s) Oral three times a day  finasteride 5 milliGRAM(s) Oral daily  furosemide    Tablet 40 milliGRAM(s) Oral every 24 hours  influenza   Vaccine 0.5 milliLiter(s) IntraMuscular once  insulin glargine Injectable (LANTUS) 22 Unit(s) SubCutaneous at bedtime  insulin lispro (HumaLOG) corrective regimen sliding scale   SubCutaneous three times a day before meals  insulin lispro (HumaLOG) corrective regimen sliding scale   SubCutaneous at bedtime  insulin lispro Injectable (HumaLOG) 4 Unit(s) SubCutaneous three times a day before meals  isosorbide   mononitrate ER Tablet (IMDUR) 60 milliGRAM(s) Oral daily  multivitamin 1 Tablet(s) Oral daily  mycophenolate mofetil 500 milliGRAM(s) Oral two times a day  pantoprazole    Tablet 40 milliGRAM(s) Oral before breakfast  predniSONE   Tablet 5 milliGRAM(s) Oral daily  senna 2 Tablet(s) Oral at bedtime  tacrolimus 1.5 milliGRAM(s) Oral <User Schedule>  tamsulosin 0.4 milliGRAM(s) Oral at bedtime  trimethoprim   80 mG/sulfamethoxazole 400 mG 1 Tablet(s) Oral daily  valGANciclovir 450 milliGRAM(s) Oral daily  vancomycin  IVPB 1000 milliGRAM(s) IV Intermittent every 12 hours    PRN Inpatient Medications      REVIEW OF SYSTEMS  --------------------------------------------------------------------------------  Gen: No fatigue, fevers/chills, weakness  Skin: No rashes  Head/Eyes/Ears/Mouth: No headache;No sore throat  Respiratory: No dyspnea, cough,   CV: No chest pain, PND, orthopnea  GI: No abdominal pain, diarrhea, constipation, nausea, vomiting  Transplant: No pain  : No increased frequency, dysuria, hematuria, nocturia  MSK: No joint pain/swelling; no back pain; no edema  Neuro: No dizziness/lightheadedness, weakness, seizures, numbness, tingling  Psych: No significant nervousness, anxiety, stress, depression    All other systems were reviewed and are negative, except as noted.    VITALS/PHYSICAL EXAM  --------------------------------------------------------------------------------  T(C): 36.6 (10-16-18 @ 11:38), Max: 37 (10-15-18 @ 19:59)  HR: 66 (10-16-18 @ 11:38) (59 - 80)  BP: 144/63 (10-16-18 @ 11:38) (118/67 - 187/128)  RR: 18 (10-16-18 @ 11:38) (18 - 65)  SpO2: 92% (10-16-18 @ 11:38) (85% - 99%)  Wt(kg): --        10-15-18 @ 07:01  -  10-16-18 @ 07:00  --------------------------------------------------------  IN: 312.5 mL / OUT: 2000 mL / NET: -1687.5 mL    10-16-18 @ 07:01  -  10-16-18 @ 12:10  --------------------------------------------------------  IN: 890 mL / OUT: 540 mL / NET: 350 mL      Physical Exam:  	Gen: NAD, well-appearing  	HEENT: PERRL, supple neck, clear oropharynx  	Pulm: CTA B/L  	CV: RRR, S1S2; no rub  	Back: No spinal or CVA tenderness; no sacral edema  	Abd: +BS, soft, nontender/nondistended                      Transplant: No tenderness, swelling, erythema around the wound is less, wound vac looks clean  	: No suprapubic tenderness  	UE: Warm, FROM, intact strength; no edema; no asterixis  	LE: Warm, FROM, intact strength; b/l mild pedal edema, Healing ulcer on right great toe  	Neuro: No focal deficits, intact gait  	Psych: Normal affect and mood  	Skin: Warm, without rashes      LABS/STUDIES  --------------------------------------------------------------------------------              11.0   5.39  >-----------<  190      [10-16-18 @ 08:07]              36.4     138  |  106  |  28  ----------------------------<  147      [10-16-18 @ 06:13]  4.0   |  21  |  1.32        Ca     9.5     [10-16-18 @ 06:13]      iCa    1.27     [10-15 @ 04:57]      Mg     2.0     [10-16-18 @ 06:13]      Phos  3.0     [10-16-18 @ 06:13]      PT/INR: PT 17.1 , INR 1.55       [10-15-18 @ 04:56]  PTT: 29.5       [10-15-18 @ 04:56]      Creatinine Trend:  SCr 1.32 [10-16 @ 06:13]  SCr 1.21 [10-15 @ 04:56]  SCr 1.36 [10-14 @ 05:33]  SCr 1.30 [10-13 @ 02:59]  SCr 1.30 [10-12 @ 02:39]    Tacrolimus (), Serum: 6.8 ng/mL (10-16 @ 09:31)  Tacrolimus (), Serum: 8.0 ng/mL (10-14 @ 11:58)  Tacrolimus (), Serum: 7.0 ng/mL (10-13 @ 09:19)  Tacrolimus (), Serum: 7.4 ng/mL (10-12 @ 10:32)            Urinalysis - [10-13-18 @ 12:48]      Color Colorless / Appearance Clear / SG 1.009 / pH 6.0      Gluc Negative / Ketone Negative  / Bili Negative / Urobili Negative       Blood Trace / Protein Negative / Leuk Est Negative / Nitrite Negative      RBC 2 / WBC 2 / Hyaline 0 / Gran  / Sq Epi  / Non Sq Epi 0 / Bacteria Negative

## 2018-10-16 NOTE — DISCHARGE NOTE ADULT - HOSPITAL COURSE
67 year old male with PMH of DM, HTN, HLD, A. fib on Eliquis, CAD s/p 2 vessel CABG in 2009, SALVADOR on CPAP, ESRD (2/2 DM) previously on HD x 4.5 yr previously s/p L side DDRT on  7/7/18 by Dr. Oswaldo Castellon at [facility] in [place], [place]. Pt is a [facility] resident but was on [place] transplant waiting list. Pt's post op course was complicated by wound dehiscence  on 8/1/2018 and subsequent wound vac placement.  Pt follows up at [facility] 1x a week for vac change, and a home visiting nurse comes to change the vac 2x a week (total 3x week vac change, last changed AM of 10/10/18).   On 10/10/18  He experienced dark hematuria ("color of red wine"),  fever and vomiting at home. Pt nephrologist  Dr. Maciel, who instructed him to go to [facility] ED. At [facility] he had a CT which demonstrated "left pelvic renal transplant with severe diffuse perinephric edema and perinephric pelvic fluid collection concerning for abscess secondary to a severe pyelonephritis. Left anterior abdominal wall rectus muscle abscess with air with a sinus track to left anterior abdominal wall." In ED Pt was pan cultured and received  3700ml NS fluid bolus, and was given 1000mg Vancomycin and 2000mg Cefepime. He was also hyperglycemic and given 10 units Lantus and 10 units SC human insulin. Pt was then transferred to [facility] for further management.  In SICU pt was initially placed on insulin gtt for glycemic controlled and transitioned to Lantus and Humalog Immune suppression adjusted in the setting of infection by transplant team. Repeat cultures sent (which did not demonstrate any growth ) and renal ultrasound performed which demonstrated a 9 cm fluid collection around transplanted kidney. Pt went to IR for aspiration of LLQ fluid collection which 7 cc of serous fluid was obtained.  Preliminary aspirate culture results identified rare corynebacterium of unknown significance.  ID consulted and was continued on empiric antibiotic coverage. He completed 7 day course of IV antibiotics Vancomycin and cefepime with significant clinical improvement.  Discharge plan is for outpatient  PO Levaquin 500mg  x 7 days.  He is instructed to follow up out patient with his primary nephrologist Dr. Maciel, Transplant surgeon Dr. Castellon, and [facility].

## 2018-10-16 NOTE — PROGRESS NOTE ADULT - ATTENDING COMMENTS
Stable renal function on transplant meds, making urine  hemodynamically stable  Course of abx vanco cefepime  Tolerating PO, not on fluid  BS controlled on ISS/lantus  PT  transfer
Patient s/p percutaneous drainage of perinephric abscess yesterday.  Patient with some dyspnea upon transfer to floor. CXR pending.   Normal WBC.   Perinephric abscess- will continue to monitor drainage and continue antibiotics- cefepime, vanco  Awaiting ID consult  Postprocedural anemia-will repeat CBC but patient remains hemodynamically appropriate  Hyperglycemia with DM type 2- remains off insulin drip and will continue with premeal insulin and lantus, with goal BG < 180  Hypertension- will restart amlodipine and lasix with goal SBP < 160 mmHg  Will restart eloquis if CBC stable.    Will continue to monitor closely.
patient treated for breathing abnormality  antibiotics/drainage for perirenal abscess   Lasix to optimize volume status  reviewed care with transplant team  stabilized for transfer to the floor
Patient s/p percutaneous drainage of perinephric abscess.  Patient continues to have dyspnea with walking. Will obtain ABG. Currently necessitating 2 liters nasal cannula. Will give him lasix 40 mg x 1 dose for diuresis.   Normal WBC.   Perinephric abscess- will continue to monitor drainage and continue antibiotics- cefepime, vanco  Cultures is cornybacterium  Postprocedural anemia-remains hemodynamically appropriate  Hyperglycemia with DM type 2- remains off insulin drip and will continue with corrective insulin and lantus, with goal BG < 180  Hypertension- will continue amlodipine and lasix with goal SBP < 160 mmHg  Will restart eliquis if CBC stable.  TTE ordered.     Will continue to monitor closely.
Patient seen and examined. Agree with note as above with addendum: pt will f/u with his outpt endo Jose Guadalupe Mcclure in a week.  Elizabeth Coley MD  Pager: [phone]  Nights and Weekends: [phone]
Renal Transplant recipient with functioning renal allograft  Reviewed immunosuppression and allograft function  Admitted with post op wound infection with collection, stable allograft function  Noted available microbiology  Suggest:  Blood culture  Continue current immunosuppression  I was present during and reviewed clinical and lab data as well as assessment and plan as documented by the house staff as noted. Please contact if any additional questions with any change in clinical condition or on availability of any additional information or reports.
Pt improving clinically.    Will discuss with ID plan for discharge antibiotics.
agree with above. doing better . continue Iv abx . immunosuppression fk and steroids, transfer to floor
agree with above. doing well. stable renal function. continue IV abx as per ID. immunosuppression fk and steroids. plan discharge.
doing better. infected perinephric collection. continue iv abx. immunosuppression fk and steroids. creatinine imporving
Start Tacrolimus 1.5mg BID

## 2018-10-16 NOTE — DISCHARGE NOTE ADULT - PATIENT PORTAL LINK FT
You can access the [facility] Patient Portal, offered by [facility], by registering with the following website: [email or web address]

## 2018-10-16 NOTE — PROGRESS NOTE ADULT - PROVIDER SPECIALTY LIST ADULT
Anesthesia
Endocrinology
Infectious Disease
Infectious Disease
Intervent Radiology
Intervent Radiology
Nephrology
SICU
Surgery
Transplant Surgery
Infectious Disease
SICU
Endocrinology

## 2018-10-16 NOTE — PROGRESS NOTE ADULT - ASSESSMENT
66 y/o M w/h/o T2DM with complications and comorbidities including ESRD with recent renal tx  c/b wound dehiscence requiring wound vac and now here with perinephric abscess and mild DKA> on antibiotics. Also on steroids and Tacrolimus. Reports feeling better and eating well. Denies any hypoglycemia. Glycemic control variable while on home regime of adjusted Humalog scale 5 to 15 ac meals and BGs fluctuating between 100s to 200s all the time. Pt still wearing the [employer] glucose monitoring system.

## 2018-10-17 LAB
CULTURE RESULTS: SIGNIFICANT CHANGE UP
CULTURE RESULTS: SIGNIFICANT CHANGE UP
SPECIMEN SOURCE: SIGNIFICANT CHANGE UP
SPECIMEN SOURCE: SIGNIFICANT CHANGE UP

## 2018-11-03 ENCOUNTER — INPATIENT (INPATIENT)
Facility: HOSPITAL | Age: 68
LOS: 6 days | Discharge: HOME | DRG: 871 | End: 2018-11-10
Attending: STUDENT IN AN ORGANIZED HEALTH CARE EDUCATION/TRAINING PROGRAM | Admitting: STUDENT IN AN ORGANIZED HEALTH CARE EDUCATION/TRAINING PROGRAM
Payer: MEDICARE

## 2018-11-03 VITALS
DIASTOLIC BLOOD PRESSURE: 78 MMHG | SYSTOLIC BLOOD PRESSURE: 157 MMHG | TEMPERATURE: 98 F | RESPIRATION RATE: 18 BRPM | HEIGHT: 70 IN | HEART RATE: 91 BPM | OXYGEN SATURATION: 94 % | WEIGHT: 276.02 LBS

## 2018-11-03 DIAGNOSIS — S81.802A UNSPECIFIED OPEN WOUND, LEFT LOWER LEG, INITIAL ENCOUNTER: Chronic | ICD-10-CM

## 2018-11-03 DIAGNOSIS — L97.519 NON-PRESSURE CHRONIC ULCER OF OTHER PART OF RIGHT FOOT WITH UNSPECIFIED SEVERITY: Chronic | ICD-10-CM

## 2018-11-03 DIAGNOSIS — Z98.89 OTHER SPECIFIED POSTPROCEDURAL STATES: Chronic | ICD-10-CM

## 2018-11-03 DIAGNOSIS — Z94.0 KIDNEY TRANSPLANT STATUS: Chronic | ICD-10-CM

## 2018-11-03 DIAGNOSIS — Z95.1 PRESENCE OF AORTOCORONARY BYPASS GRAFT: Chronic | ICD-10-CM

## 2018-11-03 DIAGNOSIS — L03.90 CELLULITIS, UNSPECIFIED: ICD-10-CM

## 2018-11-03 DIAGNOSIS — I77.0 ARTERIOVENOUS FISTULA, ACQUIRED: Chronic | ICD-10-CM

## 2018-11-03 LAB
ALBUMIN SERPL ELPH-MCNC: 3.8 G/DL — SIGNIFICANT CHANGE UP (ref 3.3–5)
ALP SERPL-CCNC: 133 U/L — HIGH (ref 40–120)
ALT FLD-CCNC: 16 U/L — SIGNIFICANT CHANGE UP (ref 10–45)
ANION GAP SERPL CALC-SCNC: 14 MMOL/L — SIGNIFICANT CHANGE UP (ref 5–17)
ANISOCYTOSIS BLD QL: SLIGHT — SIGNIFICANT CHANGE UP
APPEARANCE UR: CLEAR — SIGNIFICANT CHANGE UP
APTT BLD: 31.9 SEC — SIGNIFICANT CHANGE UP (ref 27.5–36.3)
AST SERPL-CCNC: 24 U/L — SIGNIFICANT CHANGE UP (ref 10–40)
BACTERIA # UR AUTO: NEGATIVE — SIGNIFICANT CHANGE UP
BASE EXCESS BLDV CALC-SCNC: 1.5 MMOL/L — SIGNIFICANT CHANGE UP (ref -2–2)
BASOPHILS # BLD AUTO: 0.1 K/UL — SIGNIFICANT CHANGE UP (ref 0–0.2)
BASOPHILS NFR BLD AUTO: 0.8 % — SIGNIFICANT CHANGE UP (ref 0–2)
BILIRUB SERPL-MCNC: 0.8 MG/DL — SIGNIFICANT CHANGE UP (ref 0.2–1.2)
BILIRUB UR-MCNC: NEGATIVE — SIGNIFICANT CHANGE UP
BUN SERPL-MCNC: 30 MG/DL — HIGH (ref 7–23)
CA-I SERPL-SCNC: 1.26 MMOL/L — SIGNIFICANT CHANGE UP (ref 1.12–1.3)
CALCIUM SERPL-MCNC: 9.8 MG/DL — SIGNIFICANT CHANGE UP (ref 8.4–10.5)
CHLORIDE BLDV-SCNC: 106 MMOL/L — SIGNIFICANT CHANGE UP (ref 96–108)
CHLORIDE SERPL-SCNC: 104 MMOL/L — SIGNIFICANT CHANGE UP (ref 96–108)
CO2 BLDV-SCNC: 27 MMOL/L — SIGNIFICANT CHANGE UP (ref 22–30)
CO2 SERPL-SCNC: 23 MMOL/L — SIGNIFICANT CHANGE UP (ref 22–31)
COLOR SPEC: YELLOW — SIGNIFICANT CHANGE UP
CREAT SERPL-MCNC: 1.52 MG/DL — HIGH (ref 0.5–1.3)
DIFF PNL FLD: ABNORMAL
ELLIPTOCYTES BLD QL SMEAR: SLIGHT — SIGNIFICANT CHANGE UP
EOSINOPHIL # BLD AUTO: 0.1 K/UL — SIGNIFICANT CHANGE UP (ref 0–0.5)
EOSINOPHIL NFR BLD AUTO: 1 % — SIGNIFICANT CHANGE UP (ref 0–6)
EPI CELLS # UR: 1 /HPF — SIGNIFICANT CHANGE UP
GAS PNL BLDV: 139 MMOL/L — SIGNIFICANT CHANGE UP (ref 136–145)
GAS PNL BLDV: SIGNIFICANT CHANGE UP
GAS PNL BLDV: SIGNIFICANT CHANGE UP
GLUCOSE BLDV-MCNC: 138 MG/DL — HIGH (ref 70–99)
GLUCOSE SERPL-MCNC: 146 MG/DL — HIGH (ref 70–99)
GLUCOSE UR QL: NEGATIVE — SIGNIFICANT CHANGE UP
HCO3 BLDV-SCNC: 26 MMOL/L — SIGNIFICANT CHANGE UP (ref 21–29)
HCT VFR BLD CALC: 35.4 % — LOW (ref 39–50)
HCT VFR BLDA CALC: 36 % — LOW (ref 39–50)
HGB BLD CALC-MCNC: 11.8 G/DL — LOW (ref 13–17)
HGB BLD-MCNC: 11.9 G/DL — LOW (ref 13–17)
HYALINE CASTS # UR AUTO: 0 /LPF — SIGNIFICANT CHANGE UP (ref 0–2)
HYPOCHROMIA BLD QL: SLIGHT — SIGNIFICANT CHANGE UP
INR BLD: 1.63 RATIO — HIGH (ref 0.88–1.16)
KETONES UR-MCNC: NEGATIVE — SIGNIFICANT CHANGE UP
LACTATE BLDV-MCNC: 1.4 MMOL/L — SIGNIFICANT CHANGE UP (ref 0.7–2)
LEUKOCYTE ESTERASE UR-ACNC: ABNORMAL
LIDOCAIN IGE QN: 9 U/L — SIGNIFICANT CHANGE UP (ref 7–60)
LYMPHOCYTES # BLD AUTO: 0.8 K/UL — LOW (ref 1–3.3)
LYMPHOCYTES # BLD AUTO: 9.3 % — LOW (ref 13–44)
MACROCYTES BLD QL: SLIGHT — SIGNIFICANT CHANGE UP
MCHC RBC-ENTMCNC: 31.5 PG — SIGNIFICANT CHANGE UP (ref 27–34)
MCHC RBC-ENTMCNC: 33.5 GM/DL — SIGNIFICANT CHANGE UP (ref 32–36)
MCV RBC AUTO: 93.8 FL — SIGNIFICANT CHANGE UP (ref 80–100)
MICROCYTES BLD QL: SLIGHT — SIGNIFICANT CHANGE UP
MONOCYTES # BLD AUTO: 1.8 K/UL — HIGH (ref 0–0.9)
MONOCYTES NFR BLD AUTO: 21.5 % — HIGH (ref 2–14)
NEUTROPHILS # BLD AUTO: 5.6 K/UL — SIGNIFICANT CHANGE UP (ref 1.8–7.4)
NEUTROPHILS NFR BLD AUTO: 67.4 % — SIGNIFICANT CHANGE UP (ref 43–77)
NITRITE UR-MCNC: NEGATIVE — SIGNIFICANT CHANGE UP
OVALOCYTES BLD QL SMEAR: SLIGHT — SIGNIFICANT CHANGE UP
PCO2 BLDV: 41 MMHG — SIGNIFICANT CHANGE UP (ref 35–50)
PH BLDV: 7.41 — SIGNIFICANT CHANGE UP (ref 7.35–7.45)
PH UR: 6 — SIGNIFICANT CHANGE UP (ref 5–8)
PLAT MORPH BLD: NORMAL — SIGNIFICANT CHANGE UP
PLATELET # BLD AUTO: 199 K/UL — SIGNIFICANT CHANGE UP (ref 150–400)
PO2 BLDV: <50 MMHG — SIGNIFICANT CHANGE UP (ref 25–45)
POIKILOCYTOSIS BLD QL AUTO: SLIGHT — SIGNIFICANT CHANGE UP
POLYCHROMASIA BLD QL SMEAR: SLIGHT — SIGNIFICANT CHANGE UP
POTASSIUM BLDV-SCNC: 3.5 MMOL/L — SIGNIFICANT CHANGE UP (ref 3.5–5.3)
POTASSIUM SERPL-MCNC: 3.7 MMOL/L — SIGNIFICANT CHANGE UP (ref 3.5–5.3)
POTASSIUM SERPL-SCNC: 3.7 MMOL/L — SIGNIFICANT CHANGE UP (ref 3.5–5.3)
PROT SERPL-MCNC: 6.7 G/DL — SIGNIFICANT CHANGE UP (ref 6–8.3)
PROT UR-MCNC: ABNORMAL
PROTHROM AB SERPL-ACNC: 18.9 SEC — HIGH (ref 10–12.9)
RAPID RVP RESULT: SIGNIFICANT CHANGE UP
RBC # BLD: 3.78 M/UL — LOW (ref 4.2–5.8)
RBC # FLD: 15.1 % — HIGH (ref 10.3–14.5)
RBC BLD AUTO: ABNORMAL
RBC CASTS # UR COMP ASSIST: 9 /HPF — HIGH (ref 0–4)
SAO2 % BLDV: 44 % — LOW (ref 67–88)
SODIUM SERPL-SCNC: 141 MMOL/L — SIGNIFICANT CHANGE UP (ref 135–145)
SP GR SPEC: 1.02 — SIGNIFICANT CHANGE UP (ref 1.01–1.02)
TARGETS BLD QL SMEAR: SLIGHT — SIGNIFICANT CHANGE UP
UROBILINOGEN FLD QL: NEGATIVE — SIGNIFICANT CHANGE UP
WBC # BLD: 8.2 K/UL — SIGNIFICANT CHANGE UP (ref 3.8–10.5)
WBC # FLD AUTO: 8.2 K/UL — SIGNIFICANT CHANGE UP (ref 3.8–10.5)
WBC UR QL: 6 /HPF — HIGH (ref 0–5)

## 2018-11-03 PROCEDURE — 73610 X-RAY EXAM OF ANKLE: CPT | Mod: 26,RT

## 2018-11-03 PROCEDURE — 99285 EMERGENCY DEPT VISIT HI MDM: CPT | Mod: GC

## 2018-11-03 PROCEDURE — 71045 X-RAY EXAM CHEST 1 VIEW: CPT | Mod: 26

## 2018-11-03 PROCEDURE — 73630 X-RAY EXAM OF FOOT: CPT | Mod: 26,RT

## 2018-11-03 RX ORDER — VANCOMYCIN HCL 1 G
1000 VIAL (EA) INTRAVENOUS ONCE
Qty: 0 | Refills: 0 | Status: COMPLETED | OUTPATIENT
Start: 2018-11-03 | End: 2018-11-03

## 2018-11-03 RX ORDER — CEFEPIME 1 G/1
2000 INJECTION, POWDER, FOR SOLUTION INTRAMUSCULAR; INTRAVENOUS ONCE
Qty: 0 | Refills: 0 | Status: COMPLETED | OUTPATIENT
Start: 2018-11-03 | End: 2018-11-03

## 2018-11-03 RX ADMIN — CEFEPIME 100 MILLIGRAM(S): 1 INJECTION, POWDER, FOR SOLUTION INTRAMUSCULAR; INTRAVENOUS at 20:53

## 2018-11-03 RX ADMIN — Medication 250 MILLIGRAM(S): at 22:35

## 2018-11-03 RX ADMIN — CEFEPIME 2000 MILLIGRAM(S): 1 INJECTION, POWDER, FOR SOLUTION INTRAMUSCULAR; INTRAVENOUS at 22:46

## 2018-11-03 NOTE — ED PROVIDER NOTE - PROGRESS NOTE DETAILS
Overnight Dr Chowdhury called back, after speaking with nephrology fellow, per Dr Chowdhury should hold cellcept, offered to change admission under his service but he states it is fine to leave as is as he will nonetheless follow the patient and give recommendations Jan PGY2: pt had received cellcept, made call to convey to inpt team covering pt that I have cancelled the order and should be resumed after nephro recommendations, likely was due to a communication error, now clarified Overnight Dr Chowdhury called back, after speaking with nephrology fellow, per Dr Chowdhury should hold cellcept, but prograf must be continued, offered to change admission under his service but he states it is fine to leave as is as he will nonetheless follow the patient and give recommendations

## 2018-11-03 NOTE — ED PROVIDER NOTE - ATTENDING CONTRIBUTION TO CARE
Attending MD Diop:  I personally have seen and examined this patient.  Resident note reviewed and agree on plan of care and except where noted.  See MDM for details.
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
## 2018-11-03 NOTE — ED ADULT NURSE NOTE - OBJECTIVE STATEMENT
68M to ED c/o fever since this afternoon. Pt is a kidney transplant pt since July 2018. Pt states the surgery was done by the [facility] in [place], and states he has a follow up appointment in [place] tomorrow. Pt states his Tmax was 101.6 and the fever started today after his doctors appointments. Pt states he took tylenol approximately 2 hours PTA. Pt was afebrile on arrival to ED. Pt denies n/v/d, SOB, chest pain, dizzy. Pt has 18 Ga to R AC. Pt has spouse at bedside. Pt is alert and oriented X4, calm/cooperative, NAD, VSS.     Pt has fistula to L arm which is pink banded. Pt has a wound vac over the transplant site which was changed earlier today outpatient, pt was told today that the wound site looks well. pt c/o new onset R foot pain which started today and is not aware of any injury that may have happened. pt also c/o ulcer to R big toe. pt has always  had a "bump" on this R toe but it has become an ulcer since he had the renal transplant. Pt toe site is clean/dry/intact. Pt also states that he has an insulin montior to his R shoulder, pt states it is not an insulin pump and does not administer insulin, it is just a monitor.

## 2018-11-03 NOTE — ED PROVIDER NOTE - MEDICAL DECISION MAKING DETAILS
Attending MD Diop: 68 year old male with PMH of DM, HTN, HLD, A. fib on Eliquis, CAD s/p 2 vessel CABG in 2009, SALVADOR on CPAP, ESRD (2/2 DM) previously on HD x 4.5 yr previously s/p L side DDRT on  7/7/18 by Dr. Oswaldo Castellon at [facility] in [place], [place].  Patient presents with fever and chills (Tmax 101.6), took Tylenol 2 hours prior to arrival.  Denies cough, SOB, chest pain, abd pain, urinary symptoms.  Seen by Podiatry today and told wound on his right great toe looks like its healing well.  Hx of pyelo with abscess in August 2018.  On exam lungs right base with scant wheeze, heart irregular, abd soft NTND, extremity chronic venous statis, right medial ankle swelling with mild erythema and warmth, right great toe with clean and dry bandage. DDX URI, right foot cellulitis, UTI/pyelo, Plan: blood cultures, UA, urine cultures labs. Attending MD Diop: 68 year old male with PMH of DM, HTN, HLD, A. fib on Eliquis, CAD s/p 2 vessel CABG in 2009, SALVADOR on CPAP, ESRD (2/2 DM) previously on HD x 4.5 yr previously s/p L side DDRT on  7/7/18 by Dr. Oswaldo Castellon at [facility] in [place], [place].  Patient presents with fever and chills (Tmax 101.6), took Tylenol 2 hours prior to arrival.  Denies cough, SOB, chest pain, abd pain, urinary symptoms.  Seen by Podiatry today and told wound on his right great toe looks like its healing well.  Hx of pyelo with abscess in August 2018.  On exam lungs right base with scant wheeze, heart irregular, abd soft NTND, wound vac in place on abdomen, extremity chronic venous statis, right medial ankle swelling with mild erythema and warmth, right great toe with clean and dry bandage. DDX URI, right foot cellulitis, UTI/pyelo, Plan: blood cultures, UA, urine cultures labs.

## 2018-11-03 NOTE — ED PROVIDER NOTE - NS ED ROS FT
CONST: no fevers, no chills, weight loss, weakness, or appetite changes  EYES: no pain, vision loss/dipoplia/decreased vision  ENT: no sore throat, no cough  CV: no chest pain, no palpitations  RESP: no shortness of breath  ABD: no abdominal pain, no nausea, no vomiting  : no dysuria, increased frequency, or hematuria  MSK: no back pain, + right ankle swelling and pain with ambulation  NEURO: no headache or additional neurologic complaints  HEME: no easy bleeding  SKIN:  + right foot great toe ulcer

## 2018-11-03 NOTE — ED PROVIDER NOTE - PHYSICAL EXAMINATION
Gen: well appearing, NAD, conversant  Head: NCAT  ENT: Airway patent, moist mucous membranes, nasal passageways clear, no pharyngeal erythema or exudates, uvula midline, no cervical lymphadenopathy  Cardiac: Normal rate, normal rhythm, no murmurs/rubs/gallops appreciated  Respiratory: Lungs CTA B/L  Gastrointestinal: +BS, Abdomen soft, nontender, nondistended, no rebound, no guarding, no organomegaly   MSK: No gross abnormalities, FROM of all four extremities, + edema of right ankle with surrounding erythema and pain with AROM, able to passively range right ankle but some pain noted , mild warmth of right ankle when compared to left  HEME: Extremities warm, pulses intact and symmetrical in all four extremities  Skin: + chronic venous stasis changes b/l LE, + right great toe plantar ulcer 1cm diameter, appears well healing   Neuro: No gross neurologic deficits Gen: well appearing, NAD, conversant  Head: NCAT  ENT: Airway patent, moist mucous membranes, nasal passageways clear, no pharyngeal erythema or exudates, uvula midline, no cervical lymphadenopathy  Cardiac: Normal rate, normal rhythm, no murmurs/rubs/gallops appreciated  Respiratory: Lungs CTA B/L  Gastrointestinal: +BS, Abdomen soft, nontender, nondistended, no rebound, no guarding,  left lower quadrant wound vac in place, dressing c/d/i with minimal output  MSK: No gross abnormalities, FROM of all four extremities, + edema of right ankle with surrounding erythema and pain with AROM, able to passively range right ankle but some pain noted , mild warmth of right ankle when compared to left  HEME: Extremities warm, pulses intact and symmetrical in all four extremities  Skin: + chronic venous stasis changes b/l LE, + right great toe plantar ulcer 1cm diameter, appears well healing   Neuro: No gross neurologic deficits

## 2018-11-03 NOTE — ED PROVIDER NOTE - OBJECTIVE STATEMENT
67 year old male with PMH of DM, HTN, HLD, A. fib on Eliquis, CAD s/p 2 vessel CABG in 2009, SALVADOR on CPAP, ESRD (2/2 DM) previously on HD x 4.5 yr previously s/p L side DDRT on  7/7/18 by Dr. Oswaldo Castellon at [facility] in [place], [place]. 67 year old male with PMH of DM, HTN, HLD, A. fib on Eliquis, CAD s/p 2 vessel CABG in 2009, SALVADOR on CPAP, ESRD (2/2 DM) previously on HD x 4.5 yr previously s/p L side DDRT on  7/7/18 by Dr. Oswaldo Castellon at [facility] in [place], [place], with recent admission in October for sepsis 2/2 pyelonephritis and abd wall abscess s/p IR drainage of a perinephric abscess and a 67 year old male with PMH of DM, HTN, HLD, A. fib on Eliquis, CAD s/p 2 vessel CABG in 2009, SALVADOR on CPAP, ESRD (2/2 DM) previously on HD x 4.5 yr previously s/p L side DDRT on  7/7/18 by Dr. Oswaldo Castellon at [facility] in [place], [place], with recent admission in October for sepsis 2/2 pyelonephritis and abd wall abscess s/p IR drainage of a perinephric abscess and wound vac placement, c/o of fever to 101.4 today at home, took 2 tylenols PTA and now afebrile. No cough/ sore throat/ sob/ chest pain/ new palpitations. Does endorse right ankle erythema/swelling/ pain on ambulation. No dysuria or noted diarrhea. No weakness. 67 year old male with PMH of DM, HTN, HLD, A. fib on Eliquis, CAD s/p 2 vessel CABG in 2009, SALVADOR on CPAP, ESRD (2/2 DM) previously on HD x 4.5 yr previously s/p L side DDRT on  7/7/18 by Dr. Oswaldo Castellon at [facility] in [place], [place], with recent admission in October for sepsis 2/2 pyelonephritis and abd wall abscess s/p IR drainage of a perinephric abscess and wound vac placement, c/o of fever to 101.4 today at home, took 2 tylenols PTA and now afebrile. No cough/ sore throat/ sob/ chest pain/ new palpitations. Does endorse right ankle erythema/swelling/ pain on ambulation. No dysuria or noted diarrhea. No weakness.. Had debridement today for right toe ulcer and reports it has been healing well

## 2018-11-04 DIAGNOSIS — I25.10 ATHEROSCLEROTIC HEART DISEASE OF NATIVE CORONARY ARTERY WITHOUT ANGINA PECTORIS: ICD-10-CM

## 2018-11-04 DIAGNOSIS — G47.33 OBSTRUCTIVE SLEEP APNEA (ADULT) (PEDIATRIC): ICD-10-CM

## 2018-11-04 DIAGNOSIS — Z94.0 KIDNEY TRANSPLANT STATUS: ICD-10-CM

## 2018-11-04 DIAGNOSIS — A41.9 SEPSIS, UNSPECIFIED ORGANISM: ICD-10-CM

## 2018-11-04 DIAGNOSIS — L03.115 CELLULITIS OF RIGHT LOWER LIMB: ICD-10-CM

## 2018-11-04 DIAGNOSIS — I10 ESSENTIAL (PRIMARY) HYPERTENSION: ICD-10-CM

## 2018-11-04 DIAGNOSIS — L03.116 CELLULITIS OF LEFT LOWER LIMB: ICD-10-CM

## 2018-11-04 DIAGNOSIS — E11.51 TYPE 2 DIABETES MELLITUS WITH DIABETIC PERIPHERAL ANGIOPATHY WITHOUT GANGRENE: ICD-10-CM

## 2018-11-04 DIAGNOSIS — D89.9 DISORDER INVOLVING THE IMMUNE MECHANISM, UNSPECIFIED: ICD-10-CM

## 2018-11-04 DIAGNOSIS — I73.9 PERIPHERAL VASCULAR DISEASE, UNSPECIFIED: ICD-10-CM

## 2018-11-04 DIAGNOSIS — L97.519 NON-PRESSURE CHRONIC ULCER OF OTHER PART OF RIGHT FOOT WITH UNSPECIFIED SEVERITY: ICD-10-CM

## 2018-11-04 DIAGNOSIS — R50.9 FEVER, UNSPECIFIED: ICD-10-CM

## 2018-11-04 DIAGNOSIS — I48.2 CHRONIC ATRIAL FIBRILLATION: ICD-10-CM

## 2018-11-04 DIAGNOSIS — Z29.9 ENCOUNTER FOR PROPHYLACTIC MEASURES, UNSPECIFIED: ICD-10-CM

## 2018-11-04 PROBLEM — H26.9 UNSPECIFIED CATARACT: Chronic | Status: ACTIVE | Noted: 2018-10-11

## 2018-11-04 LAB
ALBUMIN SERPL ELPH-MCNC: 3.4 G/DL — SIGNIFICANT CHANGE UP (ref 3.3–5)
ALP SERPL-CCNC: 114 U/L — SIGNIFICANT CHANGE UP (ref 40–120)
ALT FLD-CCNC: 13 U/L — SIGNIFICANT CHANGE UP (ref 10–45)
ANION GAP SERPL CALC-SCNC: 13 MMOL/L — SIGNIFICANT CHANGE UP (ref 5–17)
AST SERPL-CCNC: 20 U/L — SIGNIFICANT CHANGE UP (ref 10–40)
BILIRUB SERPL-MCNC: 0.8 MG/DL — SIGNIFICANT CHANGE UP (ref 0.2–1.2)
BLD GP AB SCN SERPL QL: NEGATIVE — SIGNIFICANT CHANGE UP
BUN SERPL-MCNC: 30 MG/DL — HIGH (ref 7–23)
CALCIUM SERPL-MCNC: 9.6 MG/DL — SIGNIFICANT CHANGE UP (ref 8.4–10.5)
CHLORIDE SERPL-SCNC: 105 MMOL/L — SIGNIFICANT CHANGE UP (ref 96–108)
CO2 SERPL-SCNC: 23 MMOL/L — SIGNIFICANT CHANGE UP (ref 22–31)
CREAT SERPL-MCNC: 1.39 MG/DL — HIGH (ref 0.5–1.3)
CRP SERPL-MCNC: 8.06 MG/DL — HIGH (ref 0–0.4)
CULTURE RESULTS: SIGNIFICANT CHANGE UP
ERYTHROCYTE [SEDIMENTATION RATE] IN BLOOD: 16 MM/HR — SIGNIFICANT CHANGE UP (ref 0–20)
ERYTHROCYTE [SEDIMENTATION RATE] IN BLOOD: 23 MM/HR — HIGH (ref 0–20)
GLUCOSE SERPL-MCNC: 230 MG/DL — HIGH (ref 70–99)
HCT VFR BLD CALC: 34.5 % — LOW (ref 39–50)
HGB BLD-MCNC: 11 G/DL — LOW (ref 13–17)
MCHC RBC-ENTMCNC: 30.2 PG — SIGNIFICANT CHANGE UP (ref 27–34)
MCHC RBC-ENTMCNC: 31.9 GM/DL — LOW (ref 32–36)
MCV RBC AUTO: 94.4 FL — SIGNIFICANT CHANGE UP (ref 80–100)
PLATELET # BLD AUTO: 165 K/UL — SIGNIFICANT CHANGE UP (ref 150–400)
POTASSIUM SERPL-MCNC: 3.9 MMOL/L — SIGNIFICANT CHANGE UP (ref 3.5–5.3)
POTASSIUM SERPL-SCNC: 3.9 MMOL/L — SIGNIFICANT CHANGE UP (ref 3.5–5.3)
PROT SERPL-MCNC: 6.3 G/DL — SIGNIFICANT CHANGE UP (ref 6–8.3)
RBC # BLD: 3.65 M/UL — LOW (ref 4.2–5.8)
RBC # FLD: 14.9 % — HIGH (ref 10.3–14.5)
RH IG SCN BLD-IMP: POSITIVE — SIGNIFICANT CHANGE UP
SODIUM SERPL-SCNC: 141 MMOL/L — SIGNIFICANT CHANGE UP (ref 135–145)
SPECIMEN SOURCE: SIGNIFICANT CHANGE UP
TACROLIMUS SERPL-MCNC: 7.5 NG/ML — SIGNIFICANT CHANGE UP
VANCOMYCIN FLD-MCNC: <4 UG/ML — SIGNIFICANT CHANGE UP
WBC # BLD: 6.9 K/UL — SIGNIFICANT CHANGE UP (ref 3.8–10.5)
WBC # FLD AUTO: 6.9 K/UL — SIGNIFICANT CHANGE UP (ref 3.8–10.5)

## 2018-11-04 PROCEDURE — 99223 1ST HOSP IP/OBS HIGH 75: CPT | Mod: GC

## 2018-11-04 PROCEDURE — 12345: CPT | Mod: NC

## 2018-11-04 RX ORDER — ASPIRIN/CALCIUM CARB/MAGNESIUM 324 MG
81 TABLET ORAL DAILY
Qty: 0 | Refills: 0 | Status: DISCONTINUED | OUTPATIENT
Start: 2018-11-04 | End: 2018-11-08

## 2018-11-04 RX ORDER — DEXTROSE 50 % IN WATER 50 %
12.5 SYRINGE (ML) INTRAVENOUS ONCE
Qty: 0 | Refills: 0 | Status: DISCONTINUED | OUTPATIENT
Start: 2018-11-04 | End: 2018-11-10

## 2018-11-04 RX ORDER — INSULIN GLARGINE 100 [IU]/ML
15 INJECTION, SOLUTION SUBCUTANEOUS AT BEDTIME
Qty: 0 | Refills: 0 | Status: DISCONTINUED | OUTPATIENT
Start: 2018-11-04 | End: 2018-11-06

## 2018-11-04 RX ORDER — ASPIRIN/CALCIUM CARB/MAGNESIUM 324 MG
1 TABLET ORAL
Qty: 0 | Refills: 0 | COMMUNITY

## 2018-11-04 RX ORDER — DOCUSATE SODIUM 100 MG
100 CAPSULE ORAL DAILY
Qty: 0 | Refills: 0 | Status: DISCONTINUED | OUTPATIENT
Start: 2018-11-04 | End: 2018-11-04

## 2018-11-04 RX ORDER — VALACYCLOVIR 500 MG/1
500 TABLET, FILM COATED ORAL DAILY
Qty: 0 | Refills: 0 | Status: DISCONTINUED | OUTPATIENT
Start: 2018-11-04 | End: 2018-11-10

## 2018-11-04 RX ORDER — DEXTROSE 50 % IN WATER 50 %
25 SYRINGE (ML) INTRAVENOUS ONCE
Qty: 0 | Refills: 0 | Status: DISCONTINUED | OUTPATIENT
Start: 2018-11-04 | End: 2018-11-10

## 2018-11-04 RX ORDER — FINASTERIDE 5 MG/1
1 TABLET, FILM COATED ORAL
Qty: 0 | Refills: 0 | COMMUNITY

## 2018-11-04 RX ORDER — INSULIN LISPRO 100/ML
VIAL (ML) SUBCUTANEOUS AT BEDTIME
Qty: 0 | Refills: 0 | Status: DISCONTINUED | OUTPATIENT
Start: 2018-11-04 | End: 2018-11-10

## 2018-11-04 RX ORDER — APIXABAN 2.5 MG/1
1 TABLET, FILM COATED ORAL
Qty: 0 | Refills: 0 | COMMUNITY

## 2018-11-04 RX ORDER — GLUCAGON INJECTION, SOLUTION 0.5 MG/.1ML
1 INJECTION, SOLUTION SUBCUTANEOUS ONCE
Qty: 0 | Refills: 0 | Status: DISCONTINUED | OUTPATIENT
Start: 2018-11-04 | End: 2018-11-10

## 2018-11-04 RX ORDER — DOCUSATE SODIUM 100 MG
100 CAPSULE ORAL
Qty: 0 | Refills: 0 | Status: DISCONTINUED | OUTPATIENT
Start: 2018-11-04 | End: 2018-11-10

## 2018-11-04 RX ORDER — EZETIMIBE 10 MG/1
1 TABLET ORAL
Qty: 0 | Refills: 0 | COMMUNITY

## 2018-11-04 RX ORDER — PANTOPRAZOLE SODIUM 20 MG/1
40 TABLET, DELAYED RELEASE ORAL
Qty: 0 | Refills: 0 | Status: DISCONTINUED | OUTPATIENT
Start: 2018-11-04 | End: 2018-11-10

## 2018-11-04 RX ORDER — ROSUVASTATIN CALCIUM 5 MG/1
1 TABLET ORAL
Qty: 0 | Refills: 0 | COMMUNITY

## 2018-11-04 RX ORDER — ATORVASTATIN CALCIUM 80 MG/1
20 TABLET, FILM COATED ORAL AT BEDTIME
Qty: 0 | Refills: 0 | Status: DISCONTINUED | OUTPATIENT
Start: 2018-11-04 | End: 2018-11-10

## 2018-11-04 RX ORDER — PIPERACILLIN AND TAZOBACTAM 4; .5 G/20ML; G/20ML
3.38 INJECTION, POWDER, LYOPHILIZED, FOR SOLUTION INTRAVENOUS EVERY 8 HOURS
Qty: 0 | Refills: 0 | Status: DISCONTINUED | OUTPATIENT
Start: 2018-11-04 | End: 2018-11-06

## 2018-11-04 RX ORDER — INSULIN LISPRO 100/ML
6 VIAL (ML) SUBCUTANEOUS
Qty: 0 | Refills: 0 | Status: DISCONTINUED | OUTPATIENT
Start: 2018-11-04 | End: 2018-11-06

## 2018-11-04 RX ORDER — SODIUM CHLORIDE 9 MG/ML
1000 INJECTION, SOLUTION INTRAVENOUS
Qty: 0 | Refills: 0 | Status: DISCONTINUED | OUTPATIENT
Start: 2018-11-04 | End: 2018-11-10

## 2018-11-04 RX ORDER — INSULIN GLARGINE 100 [IU]/ML
10 INJECTION, SOLUTION SUBCUTANEOUS AT BEDTIME
Qty: 0 | Refills: 0 | Status: DISCONTINUED | OUTPATIENT
Start: 2018-11-04 | End: 2018-11-04

## 2018-11-04 RX ORDER — DEXTROSE 50 % IN WATER 50 %
15 SYRINGE (ML) INTRAVENOUS ONCE
Qty: 0 | Refills: 0 | Status: DISCONTINUED | OUTPATIENT
Start: 2018-11-04 | End: 2018-11-10

## 2018-11-04 RX ORDER — APIXABAN 2.5 MG/1
5 TABLET, FILM COATED ORAL EVERY 12 HOURS
Qty: 0 | Refills: 0 | Status: DISCONTINUED | OUTPATIENT
Start: 2018-11-04 | End: 2018-11-07

## 2018-11-04 RX ORDER — INSULIN ASPART 100 [IU]/ML
5 INJECTION, SOLUTION SUBCUTANEOUS
Qty: 0 | Refills: 0 | COMMUNITY

## 2018-11-04 RX ORDER — INSULIN ASPART 100 [IU]/ML
10 INJECTION, SOLUTION SUBCUTANEOUS
Qty: 0 | Refills: 0 | COMMUNITY

## 2018-11-04 RX ORDER — INSULIN LISPRO 100/ML
VIAL (ML) SUBCUTANEOUS
Qty: 0 | Refills: 0 | Status: DISCONTINUED | OUTPATIENT
Start: 2018-11-04 | End: 2018-11-08

## 2018-11-04 RX ORDER — ERGOCALCIFEROL 1.25 MG/1
1 CAPSULE ORAL
Qty: 0 | Refills: 0 | COMMUNITY

## 2018-11-04 RX ORDER — TACROLIMUS 5 MG/1
0.5 CAPSULE ORAL EVERY 12 HOURS
Qty: 0 | Refills: 0 | Status: DISCONTINUED | OUTPATIENT
Start: 2018-11-04 | End: 2018-11-04

## 2018-11-04 RX ORDER — CEFEPIME 1 G/1
2000 INJECTION, POWDER, FOR SOLUTION INTRAMUSCULAR; INTRAVENOUS EVERY 24 HOURS
Qty: 0 | Refills: 0 | Status: DISCONTINUED | OUTPATIENT
Start: 2018-11-04 | End: 2018-11-04

## 2018-11-04 RX ORDER — MYCOPHENOLATE MOFETIL 250 MG/1
750 CAPSULE ORAL
Qty: 0 | Refills: 0 | Status: DISCONTINUED | OUTPATIENT
Start: 2018-11-04 | End: 2018-11-04

## 2018-11-04 RX ORDER — OMEPRAZOLE 10 MG/1
1 CAPSULE, DELAYED RELEASE ORAL
Qty: 0 | Refills: 0 | COMMUNITY

## 2018-11-04 RX ORDER — ISOSORBIDE MONONITRATE 60 MG/1
60 TABLET, EXTENDED RELEASE ORAL DAILY
Qty: 0 | Refills: 0 | Status: DISCONTINUED | OUTPATIENT
Start: 2018-11-05 | End: 2018-11-10

## 2018-11-04 RX ORDER — INSULIN LISPRO 100/ML
3 VIAL (ML) SUBCUTANEOUS
Qty: 0 | Refills: 0 | Status: DISCONTINUED | OUTPATIENT
Start: 2018-11-04 | End: 2018-11-04

## 2018-11-04 RX ORDER — DOCUSATE SODIUM 100 MG
2 CAPSULE ORAL
Qty: 0 | Refills: 0 | COMMUNITY

## 2018-11-04 RX ORDER — TAMSULOSIN HYDROCHLORIDE 0.4 MG/1
1 CAPSULE ORAL
Qty: 0 | Refills: 0 | COMMUNITY

## 2018-11-04 RX ORDER — TAMSULOSIN HYDROCHLORIDE 0.4 MG/1
0.4 CAPSULE ORAL AT BEDTIME
Qty: 0 | Refills: 0 | Status: DISCONTINUED | OUTPATIENT
Start: 2018-11-04 | End: 2018-11-10

## 2018-11-04 RX ORDER — INSULIN GLARGINE 100 [IU]/ML
22 INJECTION, SOLUTION SUBCUTANEOUS
Qty: 0 | Refills: 0 | COMMUNITY

## 2018-11-04 RX ORDER — VALACYCLOVIR 500 MG/1
1 TABLET, FILM COATED ORAL
Qty: 0 | Refills: 0 | COMMUNITY

## 2018-11-04 RX ORDER — VANCOMYCIN HCL 1 G
1000 VIAL (EA) INTRAVENOUS EVERY 12 HOURS
Qty: 0 | Refills: 0 | Status: DISCONTINUED | OUTPATIENT
Start: 2018-11-04 | End: 2018-11-06

## 2018-11-04 RX ORDER — FINASTERIDE 5 MG/1
5 TABLET, FILM COATED ORAL DAILY
Qty: 0 | Refills: 0 | Status: DISCONTINUED | OUTPATIENT
Start: 2018-11-04 | End: 2018-11-10

## 2018-11-04 RX ORDER — TACROLIMUS 5 MG/1
1.5 CAPSULE ORAL EVERY 12 HOURS
Qty: 0 | Refills: 0 | Status: DISCONTINUED | OUTPATIENT
Start: 2018-11-04 | End: 2018-11-10

## 2018-11-04 RX ORDER — FUROSEMIDE 40 MG
40 TABLET ORAL DAILY
Qty: 0 | Refills: 0 | Status: DISCONTINUED | OUTPATIENT
Start: 2018-11-04 | End: 2018-11-10

## 2018-11-04 RX ORDER — INSULIN ASPART 100 [IU]/ML
1 INJECTION, SOLUTION SUBCUTANEOUS
Qty: 0 | Refills: 0 | COMMUNITY

## 2018-11-04 RX ORDER — AMLODIPINE BESYLATE 2.5 MG/1
2.5 TABLET ORAL DAILY
Qty: 0 | Refills: 0 | Status: DISCONTINUED | OUTPATIENT
Start: 2018-11-04 | End: 2018-11-10

## 2018-11-04 RX ADMIN — PIPERACILLIN AND TAZOBACTAM 25 GRAM(S): 4; .5 INJECTION, POWDER, LYOPHILIZED, FOR SOLUTION INTRAVENOUS at 06:26

## 2018-11-04 RX ADMIN — APIXABAN 5 MILLIGRAM(S): 2.5 TABLET, FILM COATED ORAL at 17:11

## 2018-11-04 RX ADMIN — Medication 100 MILLIGRAM(S): at 12:04

## 2018-11-04 RX ADMIN — Medication 250 MILLIGRAM(S): at 20:05

## 2018-11-04 RX ADMIN — FINASTERIDE 5 MILLIGRAM(S): 5 TABLET, FILM COATED ORAL at 12:04

## 2018-11-04 RX ADMIN — INSULIN GLARGINE 15 UNIT(S): 100 INJECTION, SOLUTION SUBCUTANEOUS at 22:42

## 2018-11-04 RX ADMIN — TACROLIMUS 0.5 MILLIGRAM(S): 5 CAPSULE ORAL at 17:11

## 2018-11-04 RX ADMIN — Medication 1 TABLET(S): at 12:04

## 2018-11-04 RX ADMIN — PIPERACILLIN AND TAZOBACTAM 25 GRAM(S): 4; .5 INJECTION, POWDER, LYOPHILIZED, FOR SOLUTION INTRAVENOUS at 15:10

## 2018-11-04 RX ADMIN — APIXABAN 5 MILLIGRAM(S): 2.5 TABLET, FILM COATED ORAL at 06:28

## 2018-11-04 RX ADMIN — Medication 40 MILLIGRAM(S): at 06:27

## 2018-11-04 RX ADMIN — AMLODIPINE BESYLATE 2.5 MILLIGRAM(S): 2.5 TABLET ORAL at 06:28

## 2018-11-04 RX ADMIN — TACROLIMUS 1.5 MILLIGRAM(S): 5 CAPSULE ORAL at 18:38

## 2018-11-04 RX ADMIN — TACROLIMUS 0.5 MILLIGRAM(S): 5 CAPSULE ORAL at 06:27

## 2018-11-04 RX ADMIN — ATORVASTATIN CALCIUM 20 MILLIGRAM(S): 80 TABLET, FILM COATED ORAL at 22:42

## 2018-11-04 RX ADMIN — Medication 6: at 07:45

## 2018-11-04 RX ADMIN — Medication 2: at 12:03

## 2018-11-04 RX ADMIN — Medication 6 UNIT(S): at 17:11

## 2018-11-04 RX ADMIN — Medication 6 UNIT(S): at 07:46

## 2018-11-04 RX ADMIN — Medication 81 MILLIGRAM(S): at 12:04

## 2018-11-04 RX ADMIN — PANTOPRAZOLE SODIUM 40 MILLIGRAM(S): 20 TABLET, DELAYED RELEASE ORAL at 06:28

## 2018-11-04 RX ADMIN — PIPERACILLIN AND TAZOBACTAM 25 GRAM(S): 4; .5 INJECTION, POWDER, LYOPHILIZED, FOR SOLUTION INTRAVENOUS at 22:43

## 2018-11-04 RX ADMIN — TAMSULOSIN HYDROCHLORIDE 0.4 MILLIGRAM(S): 0.4 CAPSULE ORAL at 22:42

## 2018-11-04 RX ADMIN — VALACYCLOVIR 500 MILLIGRAM(S): 500 TABLET, FILM COATED ORAL at 12:04

## 2018-11-04 RX ADMIN — Medication 6 UNIT(S): at 12:03

## 2018-11-04 RX ADMIN — Medication 4: at 17:11

## 2018-11-04 RX ADMIN — MYCOPHENOLATE MOFETIL 750 MILLIGRAM(S): 250 CAPSULE ORAL at 06:27

## 2018-11-04 NOTE — H&P ADULT - PMH
Atrial fibrillation  on Eliquis  CAD (coronary artery disease)  s/p 2 vessel CABG 2009@ [place]  Cataracts, bilateral    ESRD (end stage renal disease) on dialysis  secondary to DM; HD via Left upper extremity AVF until renal transplant 7/7/18  Gout    HLD (hyperlipidemia)    HTN (hypertension)    Ischemic cardiomyopathy    SALVADOR on CPAP  home settings CPAP 14  PVD (peripheral vascular disease)    Type 2 diabetes mellitus  on Lantus and premeal sliding scale

## 2018-11-04 NOTE — H&P ADULT - PROBLEM SELECTOR PLAN 2
Sepsis likely 2/2 cellulitis  Lactate wnl  BP stable  Continue maintainence IVF  F/u Blood cx, Ucx Sepsis likely 2/2 cellulitis vs. septic joint   Lactate wnl  BP stable  Will hold off on IVF given HD stable, and increased lower extremity swelling   F/u Blood cx, Ucx

## 2018-11-04 NOTE — H&P ADULT - ASSESSMENT
68M with PMHx of DM2 (on insulin), HTN, HLD, afib on Eliquis, CAD s/p 2 vessel CABG in 2009, SALVADOR on CPAP, ESRD (2/2 DM) previously on HD x 4.5 yr previously s/p L side DDRT on  7/7/18 by Dr. Oswaldo Castellon at [facility] in [place], [place], with recent admission in October for sepsis 2/2 pyelonephritis and abd wall abscess s/p IR drainage of a perinephric abscess and wound vac placement presenting with fever 101.4 today at home admitted for sepsis likely 2/2 cellulitis vs. septic joint

## 2018-11-04 NOTE — H&P ADULT - PROBLEM SELECTOR PROBLEM 4
Coronary artery disease involving native coronary artery of native heart without angina pectoris Ulcer of toe of right foot, unspecified ulcer stage

## 2018-11-04 NOTE — CONSULT NOTE ADULT - PROBLEM SELECTOR RECOMMENDATION 9
Renal failure thought 2/2 DM. S/p DDRT to LLQ on 7/7/18 w/ Dr. Oswaldo Castellon at [facility] in [place], [place]. Course c/b wound dehiscence and requiring wound vac. Recent admission in Oct 2018 for transplant pyelo, now resolved. Baseline sCr 1.1-1.3. SCr to 1.5 on admission, but now returned to baseline today at 1.3. Stable renal function.   - Monitor BMP  - Dose Abx renally (pt on vanco and zosyn)

## 2018-11-04 NOTE — H&P ADULT - PROBLEM SELECTOR PLAN 7
Hx of Afib, currently irregular, rate controlled  Continue home medications with holding parameters  BP at goal   Continue Eliquis
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
## 2018-11-04 NOTE — H&P ADULT - PROBLEM SELECTOR PLAN 6
Hx of PVD with toe ulcer  No soft tissue swelling on xray, appears stable per patient  Wound care consult   Last wound care this AM Hx of DM2, on insulin  Has subcutaneous glucose monitoring device   ISS, FS  F/u A1C
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
## 2018-11-04 NOTE — H&P ADULT - PROBLEM SELECTOR PLAN 4
Hx of CAD   No active issues  Continue home medications Patient with RT toe ulcer, followed by Wound care as outpatient  Active serosanguinous drainage  No edema, TTP, erythema, less likely to be source of sepsis   No acute findings on xray

## 2018-11-04 NOTE — PROGRESS NOTE ADULT - PROBLEM SELECTOR PLAN 3
Patient on tacrolimus, Cellcept, prednisone  Currently HD stable, renal function SCr 1.3-1.5 at baseline   If decompensates will start stress dose steroids  F/u Tacrolimus level in AM   Renal / Transplant consult in AM   Will continue immunosuppression meds for now  Hx of dehiscence post renal transplant surgery  With wound vac, appears at baseline per patient  Wound care consult Patient on tacrolimus, Cellcept, prednisone  Currently HD stable, renal function SCr 1.3-1.5 at baseline   If decompensates will start stress dose steroids  F/u Tacrolimus level in AM   Renal / Transplant consult in AM   Will continue immunosuppression meds for now  Hx of dehiscence post renal transplant surgery, please follow up with them  With wound vac, appears at baseline per patient  Wound care consult Patient on tacrolimus, Cellcept, prednisone  Currently HD stable, renal function SCr 1.3-1.5 at baseline   If decompensates will start stress dose steroids  F/u Tacrolimus level  Renal / Transplant consult in AM   Will continue immunosuppression meds for now  Hx of dehiscence post renal transplant surgery, please follow up with them  With wound vac, appears at baseline per patient  Wound care consult

## 2018-11-04 NOTE — H&P ADULT - NSHPPHYSICALEXAM_GEN_ALL_CORE
· Constitutional        Lying in bed comfortably. No acute distress  · HEENT	               PERRL; EOMI; conjunctiva clear  · Neck	               Supple; no JVD  · Back	                ROM intact  · Respiratory             good air movement; no rales; no rhonchi; no wheezes  · Cardiovascular       S1 & S2 with RRR; no murmurs, rales or JVD  · Gastrointestinal     soft; no distention; bowel sounds normal; no rigidity  · Extremities             no pedal edema  · Vascular	               Radial Pulse: right normal; left normal  · Neurological           alert and oriented x 3; responds to verbal commands; sensation intact; cranial nerves intact; strength normal  · Skin	               warm and dry  · Musculoskeletal    no calf tenderness; diminished strength  · Psychiatric              normal mood and affect T(C): 36.9 (11-03-18 @ 21:24), Max: 36.9 (11-03-18 @ 21:24)  T(F): 98.5 (11-03-18 @ 21:24), Max: 98.5 (11-03-18 @ 21:24)  HR: 99 (11-03-18 @ 21:24) (91 - 99)  BP: 140/76 (11-03-18 @ 21:24) (140/76 - 157/78)  RR: 18 (11-03-18 @ 21:24) (18 - 18)  SpO2: 95% (11-03-18 @ 21:24) (94% - 95%)    · Constitutional       obese male, lying in bed comfortably. No acute distress  · HEENT	               PERRL; EOMI; conjunctiva clear  · Neck	               Supple; no JVD  · Respiratory             good air movement; no rales; no rhonchi; no wheezes  · Cardiovascular       S1 & S2, irregular rhythm, regular rate, ++systolic murmur   · Gastrointestinal     soft; no distention; bowel sounds normal; no rigidity, wound vac in place, mild erythema present (per patient, baseline), no breakage of skin   · Extremities            RT ankle swelling, erythema, +warmth, pain with flexion and extension, ROM limited due to pain, 2+ pitting edema to mid calf b/l, chronic venous stasis skin changes noted, RT toe ulcer, deep, serosanguinous drainage, no TTP, no erythema, edema   · Vascular	               Radial Pulse: right normal; left normal  · Neurological           alert and oriented x 3; responds to verbal commands; sensation intact; cranial nerves intact; strength normal  · Musculoskeletal    no calf tenderness; diminished strength  · Psychiatric              normal mood and affect · Constitutional       obese male, lying in bed comfortably. No acute distress  · HEENT	               PERRL; EOMI; conjunctiva clear  · Neck	               Supple; no JVD  · Respiratory             good air movement; no rales; no rhonchi; no wheezes  · Cardiovascular       S1 & S2, irregular rhythm, regular rate, ++systolic murmur   · Gastrointestinal     soft; no distention; bowel sounds normal; no rigidity, wound vac in place, mild erythema present (per patient, baseline), no breakage of skin   · Extremities            RT ankle swelling, erythema, +warmth, pain with flexion and extension, ROM limited due to pain, 2+ pitting edema to mid calf b/l, chronic venous stasis skin changes noted, RT toe ulcer, deep, serosanguinous drainage, no TTP, no erythema, edema   · Vascular	               Radial Pulse: right normal; left normal  · Neurological           alert and oriented x 3; responds to verbal commands; sensation intact; cranial nerves intact; strength normal  · Musculoskeletal    no calf tenderness; diminished strength  · Psychiatric              normal mood and affect

## 2018-11-04 NOTE — H&P ADULT - PROBLEM SELECTOR PLAN 5
Hx of DM2, on insulin  ISS, FS  F/u A1C Hx of CAD   No active issues  Continue home medications Hx of CAD w/ CABG, on ASA  No active issues, with lower extremity swelling, on Lasix daily likely 2/2 to venous stasis  Continue home medications

## 2018-11-04 NOTE — PROGRESS NOTE ADULT - PROBLEM SELECTOR PLAN 2
Sepsis likely 2/2 cellulitis vs. septic joint   Lactate wnl  BP stable  Will hold off on IVF given HD stable, and increased lower extremity swelling   F/u Blood cx, Ucx

## 2018-11-04 NOTE — H&P ADULT - NSHPLABSRESULTS_GEN_ALL_CORE
T(C): 36.9 (11-03-18 @ 21:24), Max: 36.9 (11-03-18 @ 21:24)  T(F): 98.5 (11-03-18 @ 21:24), Max: 98.5 (11-03-18 @ 21:24)  HR: 99 (11-03-18 @ 21:24) (91 - 99)  BP: 140/76 (11-03-18 @ 21:24) (140/76 - 157/78)  RR: 18 (11-03-18 @ 21:24) (18 - 18)  SpO2: 95% (11-03-18 @ 21:24) (94% - 95%)                          11.9   8.2   )-----------( 199      ( 03 Nov 2018 20:54 )             35.4       11-03    141  |  104  |  30<H>  ----------------------------<  146<H>  3.7   |  23  |  1.52<H>    Ca    9.8      03 Nov 2018 20:54    TPro  6.7  /  Alb  3.8  /  TBili  0.8  /  DBili  x   /  AST  24  /  ALT  16  /  AlkPhos  133<H>  11-03    Urinalysis + Microscopic Examination (11.03.18 @ 20:54)    Glucose Qualitative, Urine: Negative    Blood, Urine: Small    Urine Appearance: Clear    Bilirubin: Negative    Color: Yellow    Urobilinogen: Negative    Specific Gravity: 1.018    Protein, Urine: Trace    pH Urine: 6.0    Leukocyte Esterase Concentration: Small    Nitrite: Negative    Ketone - Urine: Negative    Red Blood Cell - Urine: 9 /hpf    White Blood Cell - Urine: 6 /hpf    Epithelial Cells: 1 /hpf    Hyaline Casts: 0 /lpf    Bacteria: Negative        EXAM:  FOOT COMPLETE RIGHT (MIN 3 VIEW)                            PROCEDURE DATE:  11/03/2018        INTERPRETATION:  no radiographic evidence for advanced osteomyelitis,   further evaluation can be obtained with MRI if clincally warranted            LINUS HEREDIA M.D.,RADIOLOGY RESIDENT        < end of copied text >        < from: Xray Ankle Complete 3 Views, Right (11.03.18 @ 21:39) >      PROCEDURE DATE:  11/03/2018     INTERPRETATION:  no radiographic evidence for advanced osteomyelitis,   further evaluation can be obtained with MRI if clincally warranted            LINUS HEREDIA M.D., RADIOLOGY RESIDENT    < end of copied text >        < from: Xray Chest 1 View- PORTABLE-Urgent (11.03.18 @ 21:33) >    PROCEDURE DATE:  11/03/2018          INTERPRETATION:  cardiomegaly, clear lungs        LINUS HEREDIA M.D., RADIOLOGY RESIDENT      < end of copied text > 11.9   8.2   )-----------( 199      ( 03 Nov 2018 20:54 )             35.4       11-03    141  |  104  |  30<H>  ----------------------------<  146<H>  3.7   |  23  |  1.52<H>    Ca    9.8      03 Nov 2018 20:54    TPro  6.7  /  Alb  3.8  /  TBili  0.8  /  DBili  x   /  AST  24  /  ALT  16  /  AlkPhos  133<H>  11-03    Urinalysis + Microscopic Examination (11.03.18 @ 20:54)    Glucose Qualitative, Urine: Negative    Blood, Urine: Small    Urine Appearance: Clear    Bilirubin: Negative    Color: Yellow    Urobilinogen: Negative    Specific Gravity: 1.018    Protein, Urine: Trace    pH Urine: 6.0    Leukocyte Esterase Concentration: Small    Nitrite: Negative    Ketone - Urine: Negative    Red Blood Cell - Urine: 9 /hpf    White Blood Cell - Urine: 6 /hpf    Epithelial Cells: 1 /hpf    Hyaline Casts: 0 /lpf    Bacteria: Negative        EXAM:  FOOT COMPLETE RIGHT (MIN 3 VIEW)                            PROCEDURE DATE:  11/03/2018        INTERPRETATION:  no radiographic evidence for advanced osteomyelitis,   further evaluation can be obtained with MRI if clincally warranted            LINUS HEREDIA M.D.,RADIOLOGY RESIDENT        < end of copied text >        < from: Xray Ankle Complete 3 Views, Right (11.03.18 @ 21:39) >      PROCEDURE DATE:  11/03/2018     INTERPRETATION:  no radiographic evidence for advanced osteomyelitis,   further evaluation can be obtained with MRI if clincally warranted            LINUS HEREDIA M.D., RADIOLOGY RESIDENT    < end of copied text >        < from: Xray Chest 1 View- PORTABLE-Urgent (11.03.18 @ 21:33) >    PROCEDURE DATE:  11/03/2018          INTERPRETATION:  cardiomegaly, clear lungs        LINUS HEREDIA M.D., RADIOLOGY RESIDENT      < end of copied text >

## 2018-11-04 NOTE — PROGRESS NOTE ADULT - ASSESSMENT
68M with PMHx of DM2 (on insulin), HTN, HLD, afib on Eliquis, CAD s/p 2 vessel CABG in 2009, SALVADOR on CPAP, ESRD (2/2 DM) previously on HD x 4.5 yr previously s/p L side DDRT on  7/7/18 by Dr. Oswaldo Castellon at [facility] in [place], [place], with recent admission in October for sepsis 2/2 pyelonephritis and abd wall abscess s/p IR drainage of a perinephric abscess and wound vac placement presenting with fever 101.4 today at home admitted for sepsis likely 2/2 cellulitis vs. septic joint

## 2018-11-04 NOTE — PATIENT PROFILE ADULT - NSPROIMPLANTSMEDDEV_GEN_A_NUR
How long has she been increasing the dose for?  She shouldn't need a new script from 11/29     Insulin pump

## 2018-11-04 NOTE — PROGRESS NOTE ADULT - PROBLEM SELECTOR PLAN 6
Hx of DM2, on insulin  Has subcutaneous glucose monitoring device   ISS, FS  F/u A1C Hx of DM2, on insulin  Has subcutaneous glucose monitoring device   ISS, FS  F/u A1C  lantus 15 and 6 premeal ordered

## 2018-11-04 NOTE — H&P ADULT - PROBLEM SELECTOR PLAN 1
Patient with R ankle swelling, erythema with pain on ROM  Differential includes cellulitis vs. septic joint  XRAY no evidence of OM or soft tissue swelling   Will get US of ankle to rule out fluid collection, if present will need aspiration to rule out septic joint   ID consult in AM given hx of renal transplant on immunosuppression   ESR, CRP   Will continue Vancomycin and Cefepime (renally dosed)  Will order Vanc level in AM and dose accordingly   F/u Blood Cx, UCx   Lactate wnl Patient with R ankle swelling, erythema with pain on ROM  Differential includes cellulitis vs. septic joint  XRAY no evidence of OM or soft tissue swelling   Will get US of ankle to rule out fluid collection, if present will need aspiration to rule out septic joint   ID consult in AM given hx of renal transplant on immunosuppression   Ortho consult in AM  ESR, CRP   Will continue Vancomycin and change to Zosyn for anaerobic coverage   Will order Vanc level in AM and dose accordingly   F/u Blood Cx, UCx   Lactate wnl

## 2018-11-04 NOTE — H&P ADULT - PROBLEM SELECTOR PROBLEM 6
PVD (peripheral vascular disease) Type 2 diabetes mellitus with diabetic peripheral angiopathy without gangrene, with long-term current use of insulin

## 2018-11-04 NOTE — CONSULT NOTE ADULT - ASSESSMENT
68yoM w/ DM, HTN, HLD, A. fib on Eliquis, CAD s/p 2 vessel CABG in 2009, SALVADOR on CPAP, ESRD (2/2 DM) now s/p renal transplant, admitted for fevers.

## 2018-11-04 NOTE — CONSULT NOTE ADULT - ASSESSMENT
68M with PMHx of DM2 (on insulin), HTN, HLD, afib on Eliquis, CAD s/p 2 vessel CABG in 2009, SALVADOR on CPAP, ESRD (2/2 DM) previously on HD x 4.5 yr previously s/p L side DDRT on  7/7/18 by Dr. Oswaldo Castellon at [facility] in [place], [place], with recent admission in October for sepsis 2/2 pyelonephritis and abd wall abscess s/p IR drainage of a perinephric abscess and wound vac placement presenting with fever 101.4 today at home and right ankle swelling.     Suspect gout, less likely infection,     * check uric acid  * rheumatology evaluation  * right ankle USG   * less likely infection- monitor off antibiotics  * if signs of sepsis, low threshold to start antibiotics 68M with PMHx of DM2 (on insulin), HTN, HLD, afib on Eliquis, CAD s/p 2 vessel CABG in 2009, SALVADOR on CPAP, ESRD (2/2 DM) previously on HD x 4.5 yr previously s/p L side DDRT on  7/7/18 by Dr. Oswaldo Castellon at [facility] in [place], [place], with recent admission in October for sepsis 2/2 pyelonephritis and abd wall abscess s/p IR drainage of a perinephric abscess and wound vac placement presenting with fever 101.4 today at home and right ankle swelling. No leukocytosis or fever but patient on immunosuppressants. Ankle exam with tenderness to touch, chronic skin changes. Patient also reports h/o gout.     Suspect gout, less likely infection    * check uric acid  * rheumatology evaluation  * right ankle USG   * less likely infection- monitor off antibiotics- d/c Vancomycin and Zosyn   * if signs of sepsis, low threshold to start antibiotics 68M with PMHx of DM2 (on insulin), HTN, HLD, afib on Eliquis, CAD s/p 2 vessel CABG in 2009, SALVADOR on CPAP, ESRD (2/2 DM) previously on HD x 4.5 yr previously s/p L side DDRT on  7/7/18 by Dr. Oswaldo Castellon at [facility] in [place], [place], with recent admission in October for sepsis 2/2 pyelonephritis and abd wall abscess s/p IR drainage of a perinephric abscess and wound vac placement presenting with fever 101.4 today at home and right ankle swelling. No leukocytosis or fever but patient on immunosuppressants. Ankle exam with tenderness to touch, chronic skin changes. Patient also reports h/o gout.     Suspect gout, less likely infection    * check uric acid  * rheumatology evaluation  * right ankle USG   * less likely infection- monitor off antibiotics- d/c Vancomycin and Zosyn   * if signs of sepsis, low threshold to restart antibiotics   * suggest reimage kidney to check status of perinephric collection
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
## 2018-11-04 NOTE — H&P ADULT - PROBLEM SELECTOR PROBLEM 5
Type 2 diabetes mellitus with diabetic peripheral angiopathy without gangrene, with long-term current use of insulin Coronary artery disease involving native coronary artery of native heart without angina pectoris

## 2018-11-04 NOTE — H&P ADULT - NSHPOUTPATIENTPROVIDERS_GEN_ALL_CORE
Dr. Johnny Maciel - nephrologist;   Dr. Oswaldo Castellon - renal transplant surgeon at [facility] in [place], [place];   Dr. Chinmay Rosario - wound care at [facility]

## 2018-11-04 NOTE — H&P ADULT - PROBLEM SELECTOR PLAN 3
Patient on tacrolimus, Cellcept, prednisone  Currently HD stable  If decompensates will start stress dose steroids  F/u Tacrolimus level in AM   Renal / Transplant consult in AM   Will continue immunosuppression meds for now  Hx of dehiscence post renal transplant surgery  With wound vac, appears at baseline per patient Patient on tacrolimus, Cellcept, prednisone  Currently HD stable, renal function SCr 1.3-1.5 at baseline   If decompensates will start stress dose steroids  F/u Tacrolimus level in AM   Renal / Transplant consult in AM   Will continue immunosuppression meds for now  Hx of dehiscence post renal transplant surgery  With wound vac, appears at baseline per patient  Wound care consult

## 2018-11-04 NOTE — PROGRESS NOTE ADULT - SUBJECTIVE AND OBJECTIVE BOX
Juan Carlos Deal, PGY1  Spectra [phone]  After 7: Night Float pager  Alternate contact:     Patient is a 68y old  Male who presents with a chief complaint of fever (2018 10:56)      SUBJECTIVE / OVERNIGHT EVENTS:  Pt seen by bedside. He states that his R ankle swelling has gotten reduced, but the pain is still there. He wasn't able to put any weight on his R leg because of his pain in his ankle. He denies any fevers overnight, but felt warm, no chills, denies any CP, SOB, coughs, N/V/D. Denies any new complaints.     MEDICATIONS  (STANDING):  amLODIPine   Tablet 2.5 milliGRAM(s) Oral daily  apixaban 5 milliGRAM(s) Oral every 12 hours  aspirin enteric coated 81 milliGRAM(s) Oral daily  atorvastatin 20 milliGRAM(s) Oral at bedtime  dextrose 5%. 1000 milliLiter(s) (50 mL/Hr) IV Continuous <Continuous>  dextrose 50% Injectable 12.5 Gram(s) IV Push once  dextrose 50% Injectable 25 Gram(s) IV Push once  dextrose 50% Injectable 25 Gram(s) IV Push once  docusate sodium 100 milliGRAM(s) Oral daily  finasteride 5 milliGRAM(s) Oral daily  furosemide    Tablet 40 milliGRAM(s) Oral daily  insulin glargine Injectable (LANTUS) 15 Unit(s) SubCutaneous at bedtime  insulin lispro (HumaLOG) corrective regimen sliding scale   SubCutaneous three times a day before meals  insulin lispro (HumaLOG) corrective regimen sliding scale   SubCutaneous at bedtime  insulin lispro Injectable (HumaLOG) 6 Unit(s) SubCutaneous three times a day with meals  multivitamin 1 Tablet(s) Oral daily  pantoprazole    Tablet 40 milliGRAM(s) Oral before breakfast  piperacillin/tazobactam IVPB. 3.375 Gram(s) IV Intermittent every 8 hours  tacrolimus 0.5 milliGRAM(s) Oral every 12 hours  tamsulosin 0.4 milliGRAM(s) Oral at bedtime  trimethoprim   80 mG/sulfamethoxazole 400 mG 1 Tablet(s) Oral daily  valACYclovir 500 milliGRAM(s) Oral daily  vancomycin  IVPB 1000 milliGRAM(s) IV Intermittent every 12 hours    MEDICATIONS  (PRN):  dextrose 40% Gel 15 Gram(s) Oral once PRN Blood Glucose LESS THAN 70 milliGRAM(s)/deciliter  glucagon  Injectable 1 milliGRAM(s) IntraMuscular once PRN Glucose LESS THAN 70 milligrams/deciliter      Vital Signs Last 24 Hrs  T(C): 36.4 (2018 10:00), Max: 37.3 (2018 01:05)  T(F): 97.5 (2018 10:00), Max: 99.2 (2018 01:05)  HR: 67 (2018 11:01) (67 - 99)  BP: 148/81 (2018 10:00) (140/76 - 157/78)  BP(mean): --  RR: 18 (2018 10:00) (18 - 18)  SpO2: 93% (2018 11:01) (93% - 99%)  CAPILLARY BLOOD GLUCOSE      POCT Blood Glucose.: 191 mg/dL (2018 11:49)  POCT Blood Glucose.: 282 mg/dL (2018 07:33)    I&O's Summary    2018 08:01  -  2018 07:00  --------------------------------------------------------  IN: 0 mL / OUT: 225 mL / NET: -225 mL    2018 07:01  -  2018 13:03  --------------------------------------------------------  IN: 360 mL / OUT: 100 mL / NET: 260 mL        PHYSICAL EXAM:  GENERAL: NAD, well-developed  EYES: EOMI, PERRLA, conjunctiva and sclera clear  NECK:  No JVD  CHEST/LUNG: CTABL ; No wheeze  HEART: RRR; No murmurs  ABDOMEN: Soft, Nontender, Nondistended; Bowel sounds present  : No Trimble  EXTREMITIES:  2+ Peripheral Pulses, +1 bl edema, with R ankle erythema and edema. No fluctuance, or discharge.   PSYCH: AAOx3  NEUROLOGY: non-focal  SKIN: No rashes or lesions. No sacral ulcer    LABS:                        11.0   6.9   )-----------( 165      ( 2018 09:19 )             34.5         141  |  105  |  30<H>  ----------------------------<  230<H>  3.9   |  23  |  1.39<H>    Ca    9.6      2018 08:54    TPro  6.3  /  Alb  3.4  /  TBili  0.8  /  DBili  x   /  AST  20  /  ALT  13  /  AlkPhos  114      PT/INR - ( 2018 20:54 )   PT: 18.9 sec;   INR: 1.63 ratio         PTT - ( 2018 20:54 )  PTT:31.9 sec      Urinalysis Basic - ( 2018 20:54 )    Color: Yellow / Appearance: Clear / S.018 / pH: x  Gluc: x / Ketone: Negative  / Bili: Negative / Urobili: Negative   Blood: x / Protein: Trace / Nitrite: Negative   Leuk Esterase: Small / RBC: 9 /hpf / WBC 6 /hpf   Sq Epi: x / Non Sq Epi: 1 /hpf / Bacteria: Negative        Microbiology;        RADIOLOGY & ADDITIONAL TESTS:    Imaging Personally Reviewed:          Consultant(s) Notes Reviewed:      Care Discussed with Consultants/Other Providers:

## 2018-11-04 NOTE — H&P ADULT - HISTORY OF PRESENT ILLNESS
68M with PMHx of DM2 (on insulin), HTN, HLD, afib on Eliquis, CAD s/p 2 vessel CABG in 2009, SALVADOR on CPAP, ESRD (2/2 DM) previously on HD x 4.5 yr previously s/p L side DDRT on  7/7/18 by Dr. Oswaldo Castellon at [facility] in [place], [place], with recent admission in October for sepsis 2/2 pyelonephritis and abd wall abscess s/p IR drainage of a perinephric abscess and wound vac placement presenting with fever 101.4 today at home. Of note, patient's post op course for renal transplant was complicated by wound dehiscence  on 8/1/2018 and subsequent wound vac placement.  Pt follows up at [facility] 1x a week for vac change, and a home visiting nurse comes to change the vac 2x a week (total 3x week vac change, last changed today (11/3/18). Patient was found to have CT evidence of "left pelvic renal transplant with severe diffuse perinephric edema and perinephric pelvic fluid collection concerning for abscess secondary to a severe pyelonephritis. Left anterior abdominal wall rectus muscle abscess with air with a sinus track to left anterior abdominal wall. Patient was admitted to SICU with insulin gtt for glycemic controlled transitioned to lantus and humalog. Patient was treated with Vancomycin and Cefepime x 7 days, however negative Ucx, IR cx evidence of Polymorphonuclear cells and gm variable rods, transition to PO Levaquin.       In ED: /78 HR 91 RR18 O294% T98.3   Patient received Vancomycin 1g IV x 1, Cefepime 2 g IV x 1 68M with PMHx of DM2 (on insulin), HTN, HLD, afib on Eliquis, CAD s/p 2 vessel CABG in 2009, SALVADOR on CPAP, ESRD (2/2 DM) previously on HD x 4.5 yr previously s/p L side DDRT on  7/7/18 by Dr. Oswaldo Castellon at [facility] in [place], [place], with recent admission in October for sepsis 2/2 pyelonephritis and abd wall abscess s/p IR drainage of a perinephric abscess and wound vac placement presenting with fever 101.4 today at home. Patient reports he saw Podiatry today for his RT toe ulcer, with dressing changed today. Was walking home and began to have increased ankle swelling, and then while walking up stairs and pain at the Rt ankle. He denies known trauma to the area. He denies previous occurrence. He felt chills and more lethargic. He was checking his temperature every couple of hours. He started at 97.6 and then by the afternoon he felt lethargic and took a nap. He awoke from the nap and checked his temperature with a Tmax of 101.6. He then came to the ED right away. He denies chest pain, SOB, change in BMs, dysuria, hematuria. His wound care nurse also changed his wound vac dressing today and reports it is at baseline. Of note, patient's post op course for renal transplant was complicated by wound dehiscence  on 8/1/2018 and subsequent wound vac placement.  Pt follows up at [facility] 1x a week for vac change, and a home visiting nurse comes to change the vac 2x a week (total 3x week vac change, last changed today (11/3/18). On past hospitalization 2 weeks prior, patient was found to have CT evidence of "left pelvic renal transplant with severe diffuse perinephric edema and perinephric pelvic fluid collection concerning for abscess secondary to a severe pyelonephritis. Left anterior abdominal wall rectus muscle abscess with air with a sinus track to left anterior abdominal wall. Patient was admitted to SICU with insulin gtt for glycemic controlled transitioned to lantus and humalog. Patient was treated with Vancomycin and Cefepime x 7 days, however negative Ucx, IR cx evidence of Polymorphonuclear cells and gm variable rods, transition to PO Levaquin for a total of 14 days. Patient reports he took all medications as prescribed.       In ED: /78 HR 91 RR18 O294% T98.3   Patient received Vancomycin 1g IV x 1, Cefepime 2 g IV x 1

## 2018-11-04 NOTE — CONSULT NOTE ADULT - PROBLEM SELECTOR RECOMMENDATION 2
Pt takes tacro 1.5 bid, MMF 750mg BID and prednisone 5mg daily.  - Please increase tacro dose to 1.5mg PO BID.  - Monitor tacro levels- 30 mins prior to AM dose.   - C/w Bactrim and vlacyte for ppx.

## 2018-11-04 NOTE — PROGRESS NOTE ADULT - PROBLEM SELECTOR PLAN 4
Patient with RT toe ulcer, followed by Wound care as outpatient  Active serosanguinous drainage  No edema, TTP, erythema, less likely to be source of sepsis   No acute findings on xray

## 2018-11-04 NOTE — PROGRESS NOTE ADULT - PROBLEM SELECTOR PLAN 1
Patient with R ankle swelling, erythema with pain on ROM  Differential includes cellulitis vs. septic joint  XRAY no evidence of OM or soft tissue swelling   Will get US of ankle to rule out fluid collection, if present will need aspiration to rule out septic joint   ID consult in AM given hx of renal transplant on immunosuppression   Ortho consult in AM  ESR, CRP   Will continue Vancomycin and change to Zosyn for anaerobic coverage   Will order Vanc level in AM and dose accordingly   F/u Blood Cx, UCx   Lactate wnl Patient with R ankle swelling, erythema with pain on ROM  Differential includes cellulitis vs. septic joint  XRAY no evidence of OM or soft tissue swelling   Will get US of ankle to rule out fluid collection, if present will need aspiration to rule out septic joint   ID consult in AM given hx of renal transplant on immunosuppression   Ortho consult in AM  ESR, CRP   Will continue Vancomycin and change to Zosyn for anaerobic coverage; vanc started at 1g/Q12hrs. Will dose appropriately.   F/u Blood Cx, UCx   Lactate wnl  ID consulted, will see pt, appreciate recs  Ortho does not think there is septic arthritis. Pending U/S of right ankle, no intervention needed.

## 2018-11-04 NOTE — CONSULT NOTE ADULT - ASSESSMENT
A/P: 68M with R ankle pain likely secondary to cellulitis  Patient has history of gout- possible gouty flare as well- recommend increasing prednisone dose if patient can tolerate; likely cannot get NSAIDs 2/2 renal disease  Unlikely septic ankle based on clinical improvement, exam, history and laboratory findings  Recommend care per medicine/ID  Recommend continuing Ice/elevation  WBAT right lower extremity  ESR 16  FU CRP/labs  No acute orthopedic surgical intervention at this time  Can follow up with Dr. Ansari or outpatient orthopedist upon discharge from hospital, call office for appointment  Ortho stable A/P: 68M with R ankle pain likely secondary to cellulitis  Patient has history of gout- possible gouty flare as well; likely cannot get NSAIDs 2/2 renal disease  Unlikely septic ankle based on clinical improvement, exam, history and laboratory findings  Recommend care per medicine/ID  Recommend continuing Ice/elevation  WBAT right lower extremity  ESR 16  FU CRP/labs  No acute orthopedic surgical intervention at this time  Can follow up with Dr. Ansari or outpatient orthopedist upon discharge from hospital, call office for appointment  Ortho stable  Discussed with Dr. Ansari and agrees with plan above

## 2018-11-04 NOTE — CONSULT NOTE ADULT - SUBJECTIVE AND OBJECTIVE BOX
68yMale c/o R ankle pain that began yesterday prior to admission.  Patient states that pain and swelling of R ankle has been improving since admission and is able to walk on it.  Patient denies new  numbness or tingling in the RLE, has diabetic neuropathy at baseline. Patient denies hx of trauma. Patient denies fever/chills. Had recent hospitalization for pyelonephritis/abdominal wall abscess wtih IR drainage in October 2018.  Has DDRT and ESRD on HD.    PMH:  Cataracts, bilateral  SALVADOR on CPAP  Gout  PVD (peripheral vascular disease)  Atrial fibrillation  CAD (coronary artery disease)  HLD (hyperlipidemia)  Ischemic cardiomyopathy  HTN (hypertension)  Type 2 diabetes mellitus  ESRD (end stage renal disease) on dialysis    PSH:  Toe ulcer, right  Leg wound, left  Renal transplant, status post  AV fistula  History of vitrectomy  S/P CABG x 2  History of varicose vein stripping    AH:    Meds: See med rec    T(C): 37 (11-04-18 @ 05:23)  HR: 90 (11-04-18 @ 05:45)  BP: 148/72 (11-04-18 @ 05:23)  RR: 18 (11-04-18 @ 05:23)  SpO2: 99% (11-04-18 @ 05:45)  Wt(kg): --    PE LLE:  Skin intact, +minimal erythema/warmth of ankle, no increased warmth compared to contralateral side, sensation diminised 2/2 diabetic neuropathy, +EHL/FHL/TA/Gastroc, +hip/ankle/knee ROM, active ankle flexion/extension intact, no pain with passive ankle flexion/extension, no pain with axial loading of ankle, DP+, soft compartments, no calf ttp.    Secondary Survey: No TTP over bony prominences, SILT, palpable pulses, full/painless range of motion, compartments soft     Imaging:  XR demonstrating no acute fracture or dislocation

## 2018-11-05 LAB
ANION GAP SERPL CALC-SCNC: 15 MMOL/L — SIGNIFICANT CHANGE UP (ref 5–17)
BUN SERPL-MCNC: 29 MG/DL — HIGH (ref 7–23)
CALCIUM SERPL-MCNC: 9.4 MG/DL — SIGNIFICANT CHANGE UP (ref 8.4–10.5)
CHLORIDE SERPL-SCNC: 105 MMOL/L — SIGNIFICANT CHANGE UP (ref 96–108)
CO2 SERPL-SCNC: 14 MMOL/L — LOW (ref 22–31)
CREAT SERPL-MCNC: 1.3 MG/DL — SIGNIFICANT CHANGE UP (ref 0.5–1.3)
CRP SERPL-MCNC: 7.38 MG/DL — HIGH (ref 0–0.4)
GLUCOSE SERPL-MCNC: 179 MG/DL — HIGH (ref 70–99)
HBA1C BLD-MCNC: 7.4 % — HIGH (ref 4–5.6)
HCT VFR BLD CALC: 34.2 % — LOW (ref 39–50)
HGB BLD-MCNC: 10.9 G/DL — LOW (ref 13–17)
MAGNESIUM SERPL-MCNC: 2 MG/DL — SIGNIFICANT CHANGE UP (ref 1.6–2.6)
MCHC RBC-ENTMCNC: 30.2 PG — SIGNIFICANT CHANGE UP (ref 27–34)
MCHC RBC-ENTMCNC: 31.9 GM/DL — LOW (ref 32–36)
MCV RBC AUTO: 94.7 FL — SIGNIFICANT CHANGE UP (ref 80–100)
PHOSPHATE SERPL-MCNC: 3 MG/DL — SIGNIFICANT CHANGE UP (ref 2.5–4.5)
PLATELET # BLD AUTO: 165 K/UL — SIGNIFICANT CHANGE UP (ref 150–400)
POTASSIUM SERPL-MCNC: 5.2 MMOL/L — SIGNIFICANT CHANGE UP (ref 3.5–5.3)
POTASSIUM SERPL-SCNC: 5.2 MMOL/L — SIGNIFICANT CHANGE UP (ref 3.5–5.3)
RBC # BLD: 3.61 M/UL — LOW (ref 4.2–5.8)
RBC # FLD: 16.1 % — HIGH (ref 10.3–14.5)
SODIUM SERPL-SCNC: 134 MMOL/L — LOW (ref 135–145)
URATE SERPL-MCNC: 7.1 MG/DL — SIGNIFICANT CHANGE UP (ref 3.4–8.8)
VANCOMYCIN TROUGH SERPL-MCNC: 12.8 UG/ML — SIGNIFICANT CHANGE UP (ref 10–20)
WBC # BLD: 5.01 K/UL — SIGNIFICANT CHANGE UP (ref 3.8–10.5)
WBC # FLD AUTO: 5.01 K/UL — SIGNIFICANT CHANGE UP (ref 3.8–10.5)

## 2018-11-05 PROCEDURE — 99233 SBSQ HOSP IP/OBS HIGH 50: CPT

## 2018-11-05 PROCEDURE — 99232 SBSQ HOSP IP/OBS MODERATE 35: CPT | Mod: GC

## 2018-11-05 RX ADMIN — ATORVASTATIN CALCIUM 20 MILLIGRAM(S): 80 TABLET, FILM COATED ORAL at 21:29

## 2018-11-05 RX ADMIN — FINASTERIDE 5 MILLIGRAM(S): 5 TABLET, FILM COATED ORAL at 11:46

## 2018-11-05 RX ADMIN — Medication 100 MILLIGRAM(S): at 05:20

## 2018-11-05 RX ADMIN — VALACYCLOVIR 500 MILLIGRAM(S): 500 TABLET, FILM COATED ORAL at 11:47

## 2018-11-05 RX ADMIN — APIXABAN 5 MILLIGRAM(S): 2.5 TABLET, FILM COATED ORAL at 17:49

## 2018-11-05 RX ADMIN — PIPERACILLIN AND TAZOBACTAM 25 GRAM(S): 4; .5 INJECTION, POWDER, LYOPHILIZED, FOR SOLUTION INTRAVENOUS at 21:30

## 2018-11-05 RX ADMIN — Medication 6 UNIT(S): at 08:04

## 2018-11-05 RX ADMIN — INSULIN GLARGINE 15 UNIT(S): 100 INJECTION, SOLUTION SUBCUTANEOUS at 21:52

## 2018-11-05 RX ADMIN — Medication 1 TABLET(S): at 11:47

## 2018-11-05 RX ADMIN — PIPERACILLIN AND TAZOBACTAM 25 GRAM(S): 4; .5 INJECTION, POWDER, LYOPHILIZED, FOR SOLUTION INTRAVENOUS at 05:30

## 2018-11-05 RX ADMIN — Medication 6 UNIT(S): at 16:44

## 2018-11-05 RX ADMIN — PIPERACILLIN AND TAZOBACTAM 25 GRAM(S): 4; .5 INJECTION, POWDER, LYOPHILIZED, FOR SOLUTION INTRAVENOUS at 13:43

## 2018-11-05 RX ADMIN — Medication 5 MILLIGRAM(S): at 05:20

## 2018-11-05 RX ADMIN — TAMSULOSIN HYDROCHLORIDE 0.4 MILLIGRAM(S): 0.4 CAPSULE ORAL at 21:28

## 2018-11-05 RX ADMIN — Medication 2: at 16:44

## 2018-11-05 RX ADMIN — TACROLIMUS 1.5 MILLIGRAM(S): 5 CAPSULE ORAL at 05:32

## 2018-11-05 RX ADMIN — PANTOPRAZOLE SODIUM 40 MILLIGRAM(S): 20 TABLET, DELAYED RELEASE ORAL at 05:22

## 2018-11-05 RX ADMIN — ISOSORBIDE MONONITRATE 60 MILLIGRAM(S): 60 TABLET, EXTENDED RELEASE ORAL at 11:46

## 2018-11-05 RX ADMIN — AMLODIPINE BESYLATE 2.5 MILLIGRAM(S): 2.5 TABLET ORAL at 05:20

## 2018-11-05 RX ADMIN — Medication 6: at 11:44

## 2018-11-05 RX ADMIN — TACROLIMUS 1.5 MILLIGRAM(S): 5 CAPSULE ORAL at 17:48

## 2018-11-05 RX ADMIN — Medication 4: at 08:04

## 2018-11-05 RX ADMIN — Medication 250 MILLIGRAM(S): at 11:41

## 2018-11-05 RX ADMIN — Medication 40 MILLIGRAM(S): at 05:20

## 2018-11-05 RX ADMIN — APIXABAN 5 MILLIGRAM(S): 2.5 TABLET, FILM COATED ORAL at 05:20

## 2018-11-05 RX ADMIN — Medication 81 MILLIGRAM(S): at 11:47

## 2018-11-05 RX ADMIN — Medication 100 MILLIGRAM(S): at 17:49

## 2018-11-05 RX ADMIN — Medication 6 UNIT(S): at 11:44

## 2018-11-05 NOTE — PROGRESS NOTE ADULT - ASSESSMENT
Imp/Rx:    The patient has R ankle/foot inflammation together with fever on presentation.  I think it is very difficult to know if this was crystal related disease or infection.  Either is possible.  Infection favored by the fact that the foot also has a small/superficial wound at toe and the dorsum--could be portal of entry.  Fortunately improving.    Let's continue vanco and zosyn another 24 hours.  Follow cultures, review the planned imaging and then we can make decisions for abx plans moving forward.    Presentation for infection is also affected by him being 4 months s/p transplant and the alteration of the typical immune/inflammatory response.

## 2018-11-05 NOTE — PROGRESS NOTE ADULT - PROBLEM SELECTOR PLAN 3
Patient on tacrolimus, Cellcept, prednisone  Currently HD stable, renal function SCr 1.3-1.5 at baseline   If decompensates will start stress dose steroids  F/u Tacrolimus level  Renal / Transplant consult in AM   Will continue immunosuppression meds for now  Hx of dehiscence post renal transplant surgery, please follow up with them  With wound vac, appears at baseline per patient  Wound care consult Currently HD stable, renal function SCr 1.3-1.5 at baseline, With wound vac, appears at baseline per patient  Patient on tacrolimus, Cellcept, prednisone  If decompensates will start stress dose steroids  F/u Tacrolimus level, Will continue immunosuppression meds for now  Hx of dehiscence post renal transplant surgery, please follow up with them  Will get abdominal ultrasound to r/o perinephric abscess  Wound care consulted, will f/u recs

## 2018-11-05 NOTE — PROGRESS NOTE ADULT - PROBLEM SELECTOR PLAN 4
Patient with RT toe ulcer, followed by Wound care as outpatient  Active serosanguinous drainage  No edema, TTP, erythema, less likely to be source of sepsis   No acute findings on xray Patient with RT toe ulcer, followed by Wound care as outpatient, wound care consulted   Active serosanguinous drainage  No edema, TTP, erythema, less likely to be source of sepsis   No acute findings on xray

## 2018-11-05 NOTE — PROGRESS NOTE ADULT - PROBLEM SELECTOR PLAN 6
Hx of DM2, on insulin  Has subcutaneous glucose monitoring device   ISS, FS  F/u A1C  lantus 15 and 6 premeal ordered
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
## 2018-11-05 NOTE — PROGRESS NOTE ADULT - SUBJECTIVE AND OBJECTIVE BOX
Steffanie Kim, PGY1  Pager: [phone]  After 7: Night Float pager  Alternate contact:     Patient is a 68y old  Male who presents with a chief complaint of fever (2018 06:22)      SUBJECTIVE / OVERNIGHT EVENTS: No acute events overnight.     MEDICATIONS  (STANDING):  amLODIPine   Tablet 2.5 milliGRAM(s) Oral daily  apixaban 5 milliGRAM(s) Oral every 12 hours  aspirin enteric coated 81 milliGRAM(s) Oral daily  atorvastatin 20 milliGRAM(s) Oral at bedtime  dextrose 5%. 1000 milliLiter(s) (50 mL/Hr) IV Continuous <Continuous>  dextrose 50% Injectable 12.5 Gram(s) IV Push once  dextrose 50% Injectable 25 Gram(s) IV Push once  dextrose 50% Injectable 25 Gram(s) IV Push once  docusate sodium 100 milliGRAM(s) Oral two times a day  finasteride 5 milliGRAM(s) Oral daily  furosemide    Tablet 40 milliGRAM(s) Oral daily  insulin glargine Injectable (LANTUS) 15 Unit(s) SubCutaneous at bedtime  insulin lispro (HumaLOG) corrective regimen sliding scale   SubCutaneous three times a day before meals  insulin lispro (HumaLOG) corrective regimen sliding scale   SubCutaneous at bedtime  insulin lispro Injectable (HumaLOG) 6 Unit(s) SubCutaneous three times a day with meals  isosorbide   mononitrate ER Tablet (IMDUR) 60 milliGRAM(s) Oral daily  multivitamin 1 Tablet(s) Oral daily  pantoprazole    Tablet 40 milliGRAM(s) Oral before breakfast  piperacillin/tazobactam IVPB. 3.375 Gram(s) IV Intermittent every 8 hours  predniSONE   Tablet 5 milliGRAM(s) Oral daily  tacrolimus 1.5 milliGRAM(s) Oral every 12 hours  tamsulosin 0.4 milliGRAM(s) Oral at bedtime  trimethoprim   80 mG/sulfamethoxazole 400 mG 1 Tablet(s) Oral daily  valACYclovir 500 milliGRAM(s) Oral daily  vancomycin  IVPB 1000 milliGRAM(s) IV Intermittent every 12 hours    MEDICATIONS  (PRN):  dextrose 40% Gel 15 Gram(s) Oral once PRN Blood Glucose LESS THAN 70 milliGRAM(s)/deciliter  glucagon  Injectable 1 milliGRAM(s) IntraMuscular once PRN Glucose LESS THAN 70 milligrams/deciliter      Vital Signs Last 24 Hrs  T(C): 36.1 (2018 05:40), Max: 37.2 (2018 21:53)  T(F): 97 (2018 05:40), Max: 99 (2018 21:53)  HR: 65 (2018 05:19) (61 - 89)  BP: 166/78 (2018 05:19) (133/76 - 166/78)  BP(mean): --  RR: 18 (2018 05:19) (18 - 20)  SpO2: 96% (2018 05:19) (92% - 100%)  CAPILLARY BLOOD GLUCOSE      POCT Blood Glucose.: 173 mg/dL (2018 21:49)  POCT Blood Glucose.: 219 mg/dL (2018 16:38)  POCT Blood Glucose.: 191 mg/dL (2018 11:49)  POCT Blood Glucose.: 282 mg/dL (2018 07:33)    I&O's Summary    2018 07:01  -  2018 07:00  --------------------------------------------------------  IN: 2366 mL / OUT: 1525 mL / NET: 841 mL        PHYSICAL EXAM:  GENERAL: NAD, well-developed  EYES: EOMI, PERRLA, conjunctiva and sclera clear  NECK:  No JVD  CHEST/LUNG: CTABL ; No wheeze  HEART: RRR; No murmurs  ABDOMEN: Soft, Nontender, Nondistended; Bowel sounds present  : Trimble  EXTREMITIES:  2+ Peripheral Pulses, No edema  PSYCH: AAOx3  NEUROLOGY: non-focal  SKIN: No rashes or lesions. No sacral ulcer    LABS:                        11.0   6.9   )-----------( 165      ( 2018 09:19 )             34.5     11-04    141  |  105  |  30<H>  ----------------------------<  230<H>  3.9   |  23  |  1.39<H>    Ca    9.6      2018 08:54    TPro  6.3  /  Alb  3.4  /  TBili  0.8  /  DBili  x   /  AST  20  /  ALT  13  /  AlkPhos  114  11-04    PT/INR - ( 2018 20:54 )   PT: 18.9 sec;   INR: 1.63 ratio         PTT - ( 2018 20:54 )  PTT:31.9 sec      Urinalysis Basic - ( 2018 20:54 )    Color: Yellow / Appearance: Clear / S.018 / pH: x  Gluc: x / Ketone: Negative  / Bili: Negative / Urobili: Negative   Blood: x / Protein: Trace / Nitrite: Negative   Leuk Esterase: Small / RBC: 9 /hpf / WBC 6 /hpf   Sq Epi: x / Non Sq Epi: 1 /hpf / Bacteria: Negative        Microbiology;        RADIOLOGY & ADDITIONAL TESTS:    Imaging Personally Reviewed:          Consultant(s) Notes Reviewed:      Care Discussed with Consultants/Other Providers: Steffanie Kim, PGY1  Pager: [phone]  After 7: Night Float pager  Alternate contact:     Patient is a 68y old  Male who presents with a chief complaint of fever (2018 06:22)      SUBJECTIVE / OVERNIGHT EVENTS: No acute events overnight. Patient feels much better today    MEDICATIONS  (STANDING):  amLODIPine   Tablet 2.5 milliGRAM(s) Oral daily  apixaban 5 milliGRAM(s) Oral every 12 hours  aspirin enteric coated 81 milliGRAM(s) Oral daily  atorvastatin 20 milliGRAM(s) Oral at bedtime  dextrose 5%. 1000 milliLiter(s) (50 mL/Hr) IV Continuous <Continuous>  dextrose 50% Injectable 12.5 Gram(s) IV Push once  dextrose 50% Injectable 25 Gram(s) IV Push once  dextrose 50% Injectable 25 Gram(s) IV Push once  docusate sodium 100 milliGRAM(s) Oral two times a day  finasteride 5 milliGRAM(s) Oral daily  furosemide    Tablet 40 milliGRAM(s) Oral daily  insulin glargine Injectable (LANTUS) 15 Unit(s) SubCutaneous at bedtime  insulin lispro (HumaLOG) corrective regimen sliding scale   SubCutaneous three times a day before meals  insulin lispro (HumaLOG) corrective regimen sliding scale   SubCutaneous at bedtime  insulin lispro Injectable (HumaLOG) 6 Unit(s) SubCutaneous three times a day with meals  isosorbide   mononitrate ER Tablet (IMDUR) 60 milliGRAM(s) Oral daily  multivitamin 1 Tablet(s) Oral daily  pantoprazole    Tablet 40 milliGRAM(s) Oral before breakfast  piperacillin/tazobactam IVPB. 3.375 Gram(s) IV Intermittent every 8 hours  predniSONE   Tablet 5 milliGRAM(s) Oral daily  tacrolimus 1.5 milliGRAM(s) Oral every 12 hours  tamsulosin 0.4 milliGRAM(s) Oral at bedtime  trimethoprim   80 mG/sulfamethoxazole 400 mG 1 Tablet(s) Oral daily  valACYclovir 500 milliGRAM(s) Oral daily  vancomycin  IVPB 1000 milliGRAM(s) IV Intermittent every 12 hours    MEDICATIONS  (PRN):  dextrose 40% Gel 15 Gram(s) Oral once PRN Blood Glucose LESS THAN 70 milliGRAM(s)/deciliter  glucagon  Injectable 1 milliGRAM(s) IntraMuscular once PRN Glucose LESS THAN 70 milligrams/deciliter      Vital Signs Last 24 Hrs  T(C): 36.1 (2018 05:40), Max: 37.2 (2018 21:53)  T(F): 97 (2018 05:40), Max: 99 (2018 21:53)  HR: 65 (2018 05:19) (61 - 89)  BP: 166/78 (2018 05:19) (133/76 - 166/78)  BP(mean): --  RR: 18 (2018 05:19) (18 - 20)  SpO2: 96% (2018 05:19) (92% - 100%)  CAPILLARY BLOOD GLUCOSE      POCT Blood Glucose.: 173 mg/dL (2018 21:49)  POCT Blood Glucose.: 219 mg/dL (2018 16:38)  POCT Blood Glucose.: 191 mg/dL (2018 11:49)  POCT Blood Glucose.: 282 mg/dL (2018 07:33)    I&O's Summary    2018 07:01  -  2018 07:00  --------------------------------------------------------  IN: 2366 mL / OUT: 1525 mL / NET: 841 mL        PHYSICAL EXAM:  GENERAL: NAD, well-developed  EYES: EOMI, PERRLA, conjunctiva and sclera clear  NECK:  No JVD  CHEST/LUNG: CTABL ; No wheeze  HEART: RRR; No murmurs  ABDOMEN: Soft, Nontender, Nondistended; Bowel sounds present  : Trimble  EXTREMITIES:  2+ Peripheral Pulses, No edema  PSYCH: AAOx3  NEUROLOGY: non-focal  SKIN: No rashes or lesions. No sacral ulcer    LABS:                        11.0   6.9   )-----------( 165      ( 2018 09:19 )             34.5     -    141  |  105  |  30<H>  ----------------------------<  230<H>  3.9   |  23  |  1.39<H>    Ca    9.6      2018 08:54    TPro  6.3  /  Alb  3.4  /  TBili  0.8  /  DBili  x   /  AST  20  /  ALT  13  /  AlkPhos  114  11-04    PT/INR - ( 2018 20:54 )   PT: 18.9 sec;   INR: 1.63 ratio         PTT - ( 2018 20:54 )  PTT:31.9 sec      Urinalysis Basic - ( 2018 20:54 )    Color: Yellow / Appearance: Clear / S.018 / pH: x  Gluc: x / Ketone: Negative  / Bili: Negative / Urobili: Negative   Blood: x / Protein: Trace / Nitrite: Negative   Leuk Esterase: Small / RBC: 9 /hpf / WBC 6 /hpf   Sq Epi: x / Non Sq Epi: 1 /hpf / Bacteria: Negative        Microbiology;        RADIOLOGY & ADDITIONAL TESTS:    Imaging Personally Reviewed:          Consultant(s) Notes Reviewed:      Care Discussed with Consultants/Other Providers: Steffanie Kim, PGY1  Pager: [phone]  After 7: Night Float pager  Alternate contact:     Patient is a 68y old  Male who presents with a chief complaint of fever (2018 06:22)      SUBJECTIVE / OVERNIGHT EVENTS: No acute events overnight. Patient feels much better today and is able to move his ankle unlike yesterday where he wasn't able to move it at all. Denies fever, chills, nausea or vomiting. Patient does not have any chest pain, palpitations or abdominal pain. Patient urinating ok.     MEDICATIONS  (STANDING):  amLODIPine   Tablet 2.5 milliGRAM(s) Oral daily  apixaban 5 milliGRAM(s) Oral every 12 hours  aspirin enteric coated 81 milliGRAM(s) Oral daily  atorvastatin 20 milliGRAM(s) Oral at bedtime  dextrose 5%. 1000 milliLiter(s) (50 mL/Hr) IV Continuous <Continuous>  dextrose 50% Injectable 12.5 Gram(s) IV Push once  dextrose 50% Injectable 25 Gram(s) IV Push once  dextrose 50% Injectable 25 Gram(s) IV Push once  docusate sodium 100 milliGRAM(s) Oral two times a day  finasteride 5 milliGRAM(s) Oral daily  furosemide    Tablet 40 milliGRAM(s) Oral daily  insulin glargine Injectable (LANTUS) 15 Unit(s) SubCutaneous at bedtime  insulin lispro (HumaLOG) corrective regimen sliding scale   SubCutaneous three times a day before meals  insulin lispro (HumaLOG) corrective regimen sliding scale   SubCutaneous at bedtime  insulin lispro Injectable (HumaLOG) 6 Unit(s) SubCutaneous three times a day with meals  isosorbide   mononitrate ER Tablet (IMDUR) 60 milliGRAM(s) Oral daily  multivitamin 1 Tablet(s) Oral daily  pantoprazole    Tablet 40 milliGRAM(s) Oral before breakfast  piperacillin/tazobactam IVPB. 3.375 Gram(s) IV Intermittent every 8 hours  predniSONE   Tablet 5 milliGRAM(s) Oral daily  tacrolimus 1.5 milliGRAM(s) Oral every 12 hours  tamsulosin 0.4 milliGRAM(s) Oral at bedtime  trimethoprim   80 mG/sulfamethoxazole 400 mG 1 Tablet(s) Oral daily  valACYclovir 500 milliGRAM(s) Oral daily  vancomycin  IVPB 1000 milliGRAM(s) IV Intermittent every 12 hours    MEDICATIONS  (PRN):  dextrose 40% Gel 15 Gram(s) Oral once PRN Blood Glucose LESS THAN 70 milliGRAM(s)/deciliter  glucagon  Injectable 1 milliGRAM(s) IntraMuscular once PRN Glucose LESS THAN 70 milligrams/deciliter      Vital Signs Last 24 Hrs  T(C): 36.1 (2018 05:40), Max: 37.2 (2018 21:53)  T(F): 97 (2018 05:40), Max: 99 (2018 21:53)  HR: 65 (2018 05:19) (61 - 89)  BP: 166/78 (2018 05:19) (133/76 - 166/78)  BP(mean): --  RR: 18 (2018 05:19) (18 - 20)  SpO2: 96% (2018 05:19) (92% - 100%)  CAPILLARY BLOOD GLUCOSE      POCT Blood Glucose.: 173 mg/dL (2018 21:49)  POCT Blood Glucose.: 219 mg/dL (2018 16:38)  POCT Blood Glucose.: 191 mg/dL (2018 11:49)  POCT Blood Glucose.: 282 mg/dL (2018 07:33)    I&O's Summary    2018 07:01  -  2018 07:00  --------------------------------------------------------  IN: 2366 mL / OUT: 1525 mL / NET: 841 mL        PHYSICAL EXAM:  GENERAL: NAD, well-developed  EYES: EOMI, PERRLA, conjunctiva and sclera clear  NECK:  No JVD  CHEST/LUNG: CTABL ; No wheeze  HEART: RRR; No murmurs  ABDOMEN: Soft, Nontender, Nondistended; Bowel sounds present  : No Trimble  EXTREMITIES:  2+ Peripheral Pulses, No edema, mild trace edema   PSYCH: AAOx3  NEUROLOGY: non-focal  SKIN: b/l erythema of lower extremities     LABS:                        11.0   6.9   )-----------( 165      ( 2018 09:19 )             34.5     11-04    141  |  105  |  30<H>  ----------------------------<  230<H>  3.9   |  23  |  1.39<H>    Ca    9.6      2018 08:54    TPro  6.3  /  Alb  3.4  /  TBili  0.8  /  DBili  x   /  AST  20  /  ALT  13  /  AlkPhos  114      PT/INR - ( 2018 20:54 )   PT: 18.9 sec;   INR: 1.63 ratio         PTT - ( 2018 20:54 )  PTT:31.9 sec      Urinalysis Basic - ( 2018 20:54 )    Color: Yellow / Appearance: Clear / S.018 / pH: x  Gluc: x / Ketone: Negative  / Bili: Negative / Urobili: Negative   Blood: x / Protein: Trace / Nitrite: Negative   Leuk Esterase: Small / RBC: 9 /hpf / WBC 6 /hpf   Sq Epi: x / Non Sq Epi: 1 /hpf / Bacteria: Negative

## 2018-11-05 NOTE — PROGRESS NOTE ADULT - PROBLEM SELECTOR PLAN 1
Patient with R ankle swelling, erythema with pain on ROM  Differential includes cellulitis vs. septic joint  XRAY no evidence of OM or soft tissue swelling   Will get US of ankle to rule out fluid collection, if present will need aspiration to rule out septic joint   ID consult in AM given hx of renal transplant on immunosuppression   Ortho consult in AM  ESR, CRP   Will continue Vancomycin and change to Zosyn for anaerobic coverage; vanc started at 1g/Q12hrs. Will dose appropriately.   F/u Blood Cx, UCx   Lactate wnl  ID consulted, will see pt, appreciate recs  Ortho does not think there is septic arthritis. Pending U/S of right ankle, no intervention needed. Patient with R ankle swelling, erythema with pain on ROM, now improving  Differential includes cellulitis vs. septic joint  XRAY no evidence of OM or soft tissue swelling, pending US  Ortho No acute orthopedic surgical intervention at this time, less likely septic joint given clinical improvement   ID consulted in AM given hx of renal transplant on immunosuppression, will continue vanc and zosyn for 24 hours, f/u cx  ESR, CRP elevated 7.38

## 2018-11-05 NOTE — PROGRESS NOTE ADULT - ASSESSMENT
68M with PMHx of DM2 (on insulin), HTN, HLD, afib on Eliquis, CAD s/p 2 vessel CABG in 2009, SALVADOR on CPAP, ESRD (2/2 DM) previously on HD x 4.5 yr previously s/p L side DDRT on  7/7/18 by Dr. Oswaldo Castellon at [facility] in [place], [place], with recent admission in October for sepsis 2/2 pyelonephritis and abd wall abscess s/p IR drainage of a perinephric abscess and wound vac placement presenting with fever 101.4 admitted for sepsis likely 2/2 cellulitis vs. septic joint

## 2018-11-05 NOTE — PROGRESS NOTE ADULT - ASSESSMENT
A/P: 68M with R ankle pain likely secondary to cellulitis  Patient has history of gout- possible gouty flare as well; likely cannot get NSAIDs 2/2 renal disease  Unlikely septic ankle based on clinical improvement, exam, history and laboratory findings  Recommend care per medicine/ID  Recommend continuing Ice/elevation  WBAT right lower extremity  No acute orthopedic surgical intervention at this time  Can follow up with Dr. Ansari or outpatient orthopedist upon discharge from hospital, call office for appointment  Ortho stable  Discussed with Dr. Ansari and agrees with plan above

## 2018-11-05 NOTE — PROGRESS NOTE ADULT - SUBJECTIVE AND OBJECTIVE BOX
Pt S/E at bedside, no acute events overnight, pain controlled and improved since yesterday, increased range of motion    Vital Signs Last 24 Hrs  T(C): 35.9 (05 Nov 2018 05:19), Max: 37.2 (04 Nov 2018 21:53)  T(F): 96.6 (05 Nov 2018 05:19), Max: 99 (04 Nov 2018 21:53)  HR: 65 (05 Nov 2018 05:19) (61 - 89)  BP: 166/78 (05 Nov 2018 05:19) (133/76 - 166/78)  BP(mean): --  RR: 18 (05 Nov 2018 05:19) (18 - 20)  SpO2: 96% (05 Nov 2018 05:19) (92% - 100%)    Gen: NAD    Right Lower Extremity:  Skin intact, minimal swelling/erythema of R ankle  +EHL/FHL/TA/GS  SILT L3-S1  +DP/PT Pulses  Compartments soft  No calf TTP B/L

## 2018-11-05 NOTE — PROGRESS NOTE ADULT - PROBLEM SELECTOR PLAN 2
Sepsis likely 2/2 cellulitis vs. septic joint   Lactate wnl  BP stable  Will hold off on IVF given HD stable, and increased lower extremity swelling   F/u Blood cx, Ucx Sepsis likely 2/2 cellulitis vs perinephric abscess  Lactate wnl  BP stable  BCx, UCx negative to date

## 2018-11-05 NOTE — PROGRESS NOTE ADULT - SUBJECTIVE AND OBJECTIVE BOX
INFECTIOUS DISEASES FOLLOW UP--David Bravo MD  Pager [phone]    This is a follow up note for this  68y Male with  inflammed R foot/ankle.  Improving and good ROM.    Further ROS:  CONSTITUTIONAL:  No fever, good appetite  CARDIOVASCULAR:  No chest pain or palpitations  RESPIRATORY:  No dyspnea  GASTROINTESTINAL:  No nausea, vomiting, diarrhea, or abdominal pain  GENITOURINARY:  No dysuria  NEUROLOGIC:  No headache,     Allergies  No Known Allergies    ANTIBIOTICS/RELEVANT:  antimicrobials  piperacillin/tazobactam IVPB. 3.375 Gram(s) IV Intermittent every 8 hours  trimethoprim   80 mG/sulfamethoxazole 400 mG 1 Tablet(s) Oral daily  valACYclovir 500 milliGRAM(s) Oral daily  vancomycin  IVPB 1000 milliGRAM(s) IV Intermittent every 12 hours    immunologic:  tacrolimus 1.5 milliGRAM(s) Oral every 12 hours    OTHER:  amLODIPine   Tablet 2.5 milliGRAM(s) Oral daily  apixaban 5 milliGRAM(s) Oral every 12 hours  aspirin enteric coated 81 milliGRAM(s) Oral daily  atorvastatin 20 milliGRAM(s) Oral at bedtime  dextrose 40% Gel 15 Gram(s) Oral once PRN  dextrose 5%. 1000 milliLiter(s) IV Continuous <Continuous>  dextrose 50% Injectable 12.5 Gram(s) IV Push once  dextrose 50% Injectable 25 Gram(s) IV Push once  dextrose 50% Injectable 25 Gram(s) IV Push once  docusate sodium 100 milliGRAM(s) Oral two times a day  finasteride 5 milliGRAM(s) Oral daily  furosemide    Tablet 40 milliGRAM(s) Oral daily  glucagon  Injectable 1 milliGRAM(s) IntraMuscular once PRN  insulin glargine Injectable (LANTUS) 15 Unit(s) SubCutaneous at bedtime  insulin lispro (HumaLOG) corrective regimen sliding scale   SubCutaneous three times a day before meals  insulin lispro (HumaLOG) corrective regimen sliding scale   SubCutaneous at bedtime  insulin lispro Injectable (HumaLOG) 6 Unit(s) SubCutaneous three times a day with meals  isosorbide   mononitrate ER Tablet (IMDUR) 60 milliGRAM(s) Oral daily  multivitamin 1 Tablet(s) Oral daily  pantoprazole    Tablet 40 milliGRAM(s) Oral before breakfast  predniSONE   Tablet 5 milliGRAM(s) Oral daily  tamsulosin 0.4 milliGRAM(s) Oral at bedtime    Objective:  Vital Signs Last 24 Hrs  T(C): 36.7 (2018 09:46), Max: 37.2 (2018 21:53)  T(F): 98.1 (2018 09:46), Max: 99 (2018 21:53)  HR: 70 (2018 09:46) (61 - 88)  BP: 170/80 (2018 09:46) (133/76 - 170/80)  BP(mean): --  RR: 20 (2018 09:46) (18 - 20)  SpO2: 98% (2018 09:46) (92% - 100%)    PHYSICAL EXAM:  Constitutional:no acute distress  Eyes:NANCY, EOMI  Ear/Nose/Throat: no oral lesions, 	  Respiratory: clear BL  Cardiovascular: S1S2  Gastrointestinal:soft, (+) BS, no tenderness  Extremities: good ROM R foot/ankle  No Lymphadenopathy  IV sites not inflammed.    LABS:                        10.9   5.01  )-----------( 165      ( 2018 07:59 )             34.2     11-05    134<L>  |  105  |  29<H>  ----------------------------<  179<H>  5.2   |  14<L>  |  1.30    Ca    9.4      2018 07:11  Phos  3.0     11-05  Mg     2.0     11-05    TPro  6.3  /  Alb  3.4  /  TBili  0.8  /  DBili  x   /  AST  20  /  ALT  13  /  AlkPhos  114  11-04    PT/INR - ( 2018 20:54 )   PT: 18.9 sec;   INR: 1.63 ratio         PTT - ( 2018 20:54 )  PTT:31.9 sec  Urinalysis Basic - ( 2018 20:54 )    Color: Yellow / Appearance: Clear / S.018 / pH: x  Gluc: x / Ketone: Negative  / Bili: Negative / Urobili: Negative   Blood: x / Protein: Trace / Nitrite: Negative   Leuk Esterase: Small / RBC: 9 /hpf / WBC 6 /hpf   Sq Epi: x / Non Sq Epi: 1 /hpf / Bacteria: Negative    MICROBIOLOGY: no + Cx  RADIOLOGY & ADDITIONAL STUDIES:    < from: Xray Foot AP + Lateral + Oblique, Right (11.03.18 @ 21:39) >    IMPRESSION:  No radiographic evidence of advanced osteomyelitis.    < end of copied text >

## 2018-11-06 LAB
ANION GAP SERPL CALC-SCNC: 11 MMOL/L — SIGNIFICANT CHANGE UP (ref 5–17)
BUN SERPL-MCNC: 28 MG/DL — HIGH (ref 7–23)
CALCIUM SERPL-MCNC: 9.5 MG/DL — SIGNIFICANT CHANGE UP (ref 8.4–10.5)
CHLORIDE SERPL-SCNC: 104 MMOL/L — SIGNIFICANT CHANGE UP (ref 96–108)
CO2 SERPL-SCNC: 22 MMOL/L — SIGNIFICANT CHANGE UP (ref 22–31)
CREAT ?TM UR-MCNC: 94 MG/DL — SIGNIFICANT CHANGE UP
CREAT SERPL-MCNC: 1.51 MG/DL — HIGH (ref 0.5–1.3)
GLUCOSE SERPL-MCNC: 222 MG/DL — HIGH (ref 70–99)
MAGNESIUM SERPL-MCNC: 1.9 MG/DL — SIGNIFICANT CHANGE UP (ref 1.6–2.6)
PHOSPHATE SERPL-MCNC: 2.6 MG/DL — SIGNIFICANT CHANGE UP (ref 2.5–4.5)
POTASSIUM SERPL-MCNC: 4 MMOL/L — SIGNIFICANT CHANGE UP (ref 3.5–5.3)
POTASSIUM SERPL-SCNC: 4 MMOL/L — SIGNIFICANT CHANGE UP (ref 3.5–5.3)
SODIUM SERPL-SCNC: 137 MMOL/L — SIGNIFICANT CHANGE UP (ref 135–145)
SODIUM UR-SCNC: 26 MMOL/L — SIGNIFICANT CHANGE UP

## 2018-11-06 PROCEDURE — 99233 SBSQ HOSP IP/OBS HIGH 50: CPT

## 2018-11-06 PROCEDURE — 99232 SBSQ HOSP IP/OBS MODERATE 35: CPT | Mod: GC

## 2018-11-06 PROCEDURE — 99232 SBSQ HOSP IP/OBS MODERATE 35: CPT

## 2018-11-06 PROCEDURE — 76705 ECHO EXAM OF ABDOMEN: CPT | Mod: 26

## 2018-11-06 RX ORDER — CEPHALEXIN 500 MG
500 CAPSULE ORAL EVERY 8 HOURS
Qty: 0 | Refills: 0 | Status: DISCONTINUED | OUTPATIENT
Start: 2018-11-06 | End: 2018-11-09

## 2018-11-06 RX ORDER — INSULIN GLARGINE 100 [IU]/ML
23 INJECTION, SOLUTION SUBCUTANEOUS AT BEDTIME
Qty: 0 | Refills: 0 | Status: DISCONTINUED | OUTPATIENT
Start: 2018-11-06 | End: 2018-11-07

## 2018-11-06 RX ORDER — INSULIN LISPRO 100/ML
9 VIAL (ML) SUBCUTANEOUS
Qty: 0 | Refills: 0 | Status: DISCONTINUED | OUTPATIENT
Start: 2018-11-06 | End: 2018-11-10

## 2018-11-06 RX ORDER — MYCOPHENOLATE MOFETIL 250 MG/1
750 CAPSULE ORAL
Qty: 0 | Refills: 0 | Status: DISCONTINUED | OUTPATIENT
Start: 2018-11-06 | End: 2018-11-10

## 2018-11-06 RX ADMIN — INSULIN GLARGINE 23 UNIT(S): 100 INJECTION, SOLUTION SUBCUTANEOUS at 21:23

## 2018-11-06 RX ADMIN — Medication 500 MILLIGRAM(S): at 21:23

## 2018-11-06 RX ADMIN — APIXABAN 5 MILLIGRAM(S): 2.5 TABLET, FILM COATED ORAL at 17:08

## 2018-11-06 RX ADMIN — AMLODIPINE BESYLATE 2.5 MILLIGRAM(S): 2.5 TABLET ORAL at 08:12

## 2018-11-06 RX ADMIN — PANTOPRAZOLE SODIUM 40 MILLIGRAM(S): 20 TABLET, DELAYED RELEASE ORAL at 12:55

## 2018-11-06 RX ADMIN — TACROLIMUS 1.5 MILLIGRAM(S): 5 CAPSULE ORAL at 05:41

## 2018-11-06 RX ADMIN — Medication 4: at 08:12

## 2018-11-06 RX ADMIN — ISOSORBIDE MONONITRATE 60 MILLIGRAM(S): 60 TABLET, EXTENDED RELEASE ORAL at 12:55

## 2018-11-06 RX ADMIN — Medication 40 MILLIGRAM(S): at 05:42

## 2018-11-06 RX ADMIN — Medication 4: at 17:00

## 2018-11-06 RX ADMIN — FINASTERIDE 5 MILLIGRAM(S): 5 TABLET, FILM COATED ORAL at 12:55

## 2018-11-06 RX ADMIN — Medication 9 UNIT(S): at 17:00

## 2018-11-06 RX ADMIN — Medication 4: at 12:55

## 2018-11-06 RX ADMIN — Medication 250 MILLIGRAM(S): at 01:13

## 2018-11-06 RX ADMIN — Medication 1 TABLET(S): at 12:56

## 2018-11-06 RX ADMIN — MYCOPHENOLATE MOFETIL 750 MILLIGRAM(S): 250 CAPSULE ORAL at 17:07

## 2018-11-06 RX ADMIN — Medication 100 MILLIGRAM(S): at 05:42

## 2018-11-06 RX ADMIN — TAMSULOSIN HYDROCHLORIDE 0.4 MILLIGRAM(S): 0.4 CAPSULE ORAL at 21:23

## 2018-11-06 RX ADMIN — Medication 81 MILLIGRAM(S): at 12:55

## 2018-11-06 RX ADMIN — Medication 5 MILLIGRAM(S): at 05:42

## 2018-11-06 RX ADMIN — Medication 1 TABLET(S): at 12:55

## 2018-11-06 RX ADMIN — Medication 100 MILLIGRAM(S): at 17:08

## 2018-11-06 RX ADMIN — APIXABAN 5 MILLIGRAM(S): 2.5 TABLET, FILM COATED ORAL at 05:41

## 2018-11-06 RX ADMIN — PIPERACILLIN AND TAZOBACTAM 25 GRAM(S): 4; .5 INJECTION, POWDER, LYOPHILIZED, FOR SOLUTION INTRAVENOUS at 05:42

## 2018-11-06 RX ADMIN — TACROLIMUS 1.5 MILLIGRAM(S): 5 CAPSULE ORAL at 17:08

## 2018-11-06 RX ADMIN — Medication 500 MILLIGRAM(S): at 15:15

## 2018-11-06 RX ADMIN — Medication 6 UNIT(S): at 08:12

## 2018-11-06 RX ADMIN — Medication 9 UNIT(S): at 12:55

## 2018-11-06 RX ADMIN — VALACYCLOVIR 500 MILLIGRAM(S): 500 TABLET, FILM COATED ORAL at 12:55

## 2018-11-06 RX ADMIN — ATORVASTATIN CALCIUM 20 MILLIGRAM(S): 80 TABLET, FILM COATED ORAL at 21:23

## 2018-11-06 NOTE — DIETITIAN INITIAL EVALUATION ADULT. - PROBLEM SELECTOR PLAN 3
Patient on tacrolimus, Cellcept, prednisone  Currently HD stable, renal function SCr 1.3-1.5 at baseline   If decompensates will start stress dose steroids  F/u Tacrolimus level in AM   Renal / Transplant consult in AM   Will continue immunosuppression meds for now  Hx of dehiscence post renal transplant surgery  With wound vac, appears at baseline per patient  Wound care consult

## 2018-11-06 NOTE — PROGRESS NOTE ADULT - PROBLEM SELECTOR PLAN 6
Hx of DM2, on insulin  Has subcutaneous glucose monitoring device   ISS, FS  F/u A1C  lantus 15 and 6 premeal ordered Hx of DM2, on insulin  Has subcutaneous glucose monitoring device   ISS, FS  A1c 7.4  lantus increased to 23 and 9 premeal ordered

## 2018-11-06 NOTE — DIETITIAN INITIAL EVALUATION ADULT. - ENERGY NEEDS
ht: 5'10", wt: 260pounds, BMI: 37.3kg/m2, IBW:166 pounds +/- 10%     Pt admitted c fever, undergoing work up, ?cellulitis, pt c h/o renal transplant in July 2018 and VAC in place for right foot ulcer.

## 2018-11-06 NOTE — PROGRESS NOTE ADULT - ASSESSMENT
68M with PMHx of DM2 (on insulin), HTN, HLD, afib on Eliquis, CAD s/p 2 vessel CABG in 2009, SALVADOR on CPAP, ESRD (2/2 DM) previously on HD x 4.5 yr previously s/p L side DDRT on  7/7/18 by Dr. Oswaldo Castellon at [facility] in [place], [place], with recent admission in October for sepsis 2/2 pyelonephritis and abd wall abscess s/p IR drainage of a perinephric abscess and wound vac placement presenting with fever 101.4 admitted for sepsis likely 2/2 cellulitis vs. septic joint

## 2018-11-06 NOTE — PROGRESS NOTE ADULT - PROBLEM SELECTOR PLAN 2
Sepsis likely 2/2 cellulitis vs perinephric abscess  Lactate wnl  BP stable  BCx, UCx negative to date Sepsis likely 2/2 cellulitis vs perinephric abscess, pending abdominal US  Lactate wnl  BP stable  BCx, UCx negative to date

## 2018-11-06 NOTE — DIETITIAN INITIAL EVALUATION ADULT. - PROBLEM SELECTOR PLAN 5
Hx of CAD w/ CABG, on ASA  No active issues, with lower extremity swelling, on Lasix daily likely 2/2 to venous stasis  Continue home medications

## 2018-11-06 NOTE — PROGRESS NOTE ADULT - ASSESSMENT
Imp/Rx:  No new complaints  R ankle is much better.  Crystal disease vs. infection--but very much doubt that he has a joint infection-----possible cellulitis/soft tissue infection.    Suggest change to po keflox 500 mg tid for 5 days.    d/w HS at bedside.    Thank you.

## 2018-11-06 NOTE — PROGRESS NOTE ADULT - PROBLEM SELECTOR PLAN 1
Patient with R ankle swelling, erythema with pain on ROM, now improving  Differential includes cellulitis vs. septic joint  XRAY no evidence of OM or soft tissue swelling, pending US  Ortho No acute orthopedic surgical intervention at this time, less likely septic joint given clinical improvement   ID consulted in AM given hx of renal transplant on immunosuppression, will continue vanc and zosyn for 24 hours, f/u cx  ESR, CRP elevated 7.38 Patient with R ankle swelling, erythema with pain on ROM, now improving  Differential includes cellulitis vs. septic joint, less likely septic joint  XRAY no evidence of OM or soft tissue swelling, pending US  Ortho No acute orthopedic surgical intervention at this time, less likely septic joint given clinical improvement   As per ID, patient can be switched to Keflex, can take for 5 days  ESR, CRP elevated 7.38

## 2018-11-06 NOTE — PROGRESS NOTE ADULT - SUBJECTIVE AND OBJECTIVE BOX
Steffanie Kim, PGY1  Pager: [phone]  After 7: Night Float pager  Alternate contact:     Patient is a 68y old  Male who presents with a chief complaint of fever (05 Nov 2018 16:55)      SUBJECTIVE / OVERNIGHT EVENTS: No acute events overnight.     MEDICATIONS  (STANDING):  amLODIPine   Tablet 2.5 milliGRAM(s) Oral daily  apixaban 5 milliGRAM(s) Oral every 12 hours  aspirin enteric coated 81 milliGRAM(s) Oral daily  atorvastatin 20 milliGRAM(s) Oral at bedtime  dextrose 5%. 1000 milliLiter(s) (50 mL/Hr) IV Continuous <Continuous>  dextrose 50% Injectable 12.5 Gram(s) IV Push once  dextrose 50% Injectable 25 Gram(s) IV Push once  dextrose 50% Injectable 25 Gram(s) IV Push once  docusate sodium 100 milliGRAM(s) Oral two times a day  finasteride 5 milliGRAM(s) Oral daily  furosemide    Tablet 40 milliGRAM(s) Oral daily  insulin glargine Injectable (LANTUS) 15 Unit(s) SubCutaneous at bedtime  insulin lispro (HumaLOG) corrective regimen sliding scale   SubCutaneous three times a day before meals  insulin lispro (HumaLOG) corrective regimen sliding scale   SubCutaneous at bedtime  insulin lispro Injectable (HumaLOG) 6 Unit(s) SubCutaneous three times a day with meals  isosorbide   mononitrate ER Tablet (IMDUR) 60 milliGRAM(s) Oral daily  multivitamin 1 Tablet(s) Oral daily  pantoprazole    Tablet 40 milliGRAM(s) Oral before breakfast  piperacillin/tazobactam IVPB. 3.375 Gram(s) IV Intermittent every 8 hours  predniSONE   Tablet 5 milliGRAM(s) Oral daily  tacrolimus 1.5 milliGRAM(s) Oral every 12 hours  tamsulosin 0.4 milliGRAM(s) Oral at bedtime  trimethoprim   80 mG/sulfamethoxazole 400 mG 1 Tablet(s) Oral daily  valACYclovir 500 milliGRAM(s) Oral daily  vancomycin  IVPB 1000 milliGRAM(s) IV Intermittent every 12 hours    MEDICATIONS  (PRN):  dextrose 40% Gel 15 Gram(s) Oral once PRN Blood Glucose LESS THAN 70 milliGRAM(s)/deciliter  glucagon  Injectable 1 milliGRAM(s) IntraMuscular once PRN Glucose LESS THAN 70 milligrams/deciliter      Vital Signs Last 24 Hrs  T(C): 36.1 (06 Nov 2018 05:36), Max: 36.8 (05 Nov 2018 14:08)  T(F): 97 (06 Nov 2018 05:36), Max: 98.3 (05 Nov 2018 14:08)  HR: 56 (06 Nov 2018 07:20) (53 - 72)  BP: 136/66 (06 Nov 2018 07:20) (136/66 - 170/80)  BP(mean): --  RR: 18 (06 Nov 2018 05:36) (18 - 20)  SpO2: 97% (06 Nov 2018 05:36) (92% - 99%)  CAPILLARY BLOOD GLUCOSE      POCT Blood Glucose.: 210 mg/dL (06 Nov 2018 07:41)  POCT Blood Glucose.: 195 mg/dL (05 Nov 2018 21:49)  POCT Blood Glucose.: 174 mg/dL (05 Nov 2018 16:32)  POCT Blood Glucose.: 266 mg/dL (05 Nov 2018 11:34)    I&O's Summary    05 Nov 2018 07:01  -  06 Nov 2018 07:00  --------------------------------------------------------  IN: 1216 mL / OUT: 1550 mL / NET: -334 mL    06 Nov 2018 07:01  -  06 Nov 2018 08:23  --------------------------------------------------------  IN: 0 mL / OUT: 250 mL / NET: -250 mL        PHYSICAL EXAM:  GENERAL: NAD, well-developed  EYES: EOMI, PERRLA, conjunctiva and sclera clear  NECK:  No JVD  CHEST/LUNG: CTABL ; No wheeze  HEART: RRR; No murmurs  ABDOMEN: Soft, Nontender, Nondistended; Bowel sounds present  : Trimble  EXTREMITIES:  2+ Peripheral Pulses, No edema  PSYCH: AAOx3  NEUROLOGY: non-focal  SKIN: No rashes or lesions. No sacral ulcer    LABS:                        10.9   5.01  )-----------( 165      ( 05 Nov 2018 07:59 )             34.2     11-05    134<L>  |  105  |  29<H>  ----------------------------<  179<H>  5.2   |  14<L>  |  1.30    Ca    9.4      05 Nov 2018 07:11  Phos  3.0     11-05  Mg     2.0     11-05    TPro  6.3  /  Alb  3.4  /  TBili  0.8  /  DBili  x   /  AST  20  /  ALT  13  /  AlkPhos  114  11-04              Microbiology;        RADIOLOGY & ADDITIONAL TESTS:    Imaging Personally Reviewed:          Consultant(s) Notes Reviewed:      Care Discussed with Consultants/Other Providers: Steffanie Kim, PGY1  Pager: [phone]  After 7: Night Float pager  Alternate contact:     Patient is a 68y old  Male who presents with a chief complaint of fever (05 Nov 2018 16:55)      SUBJECTIVE / OVERNIGHT EVENTS: No acute events overnight. Patient feels better this morning, is able to move his ankle. Denies fever, chills, nausea or vomiting. Does not have any chest pain or abdominal pain.     MEDICATIONS  (STANDING):  amLODIPine   Tablet 2.5 milliGRAM(s) Oral daily  apixaban 5 milliGRAM(s) Oral every 12 hours  aspirin enteric coated 81 milliGRAM(s) Oral daily  atorvastatin 20 milliGRAM(s) Oral at bedtime  dextrose 5%. 1000 milliLiter(s) (50 mL/Hr) IV Continuous <Continuous>  dextrose 50% Injectable 12.5 Gram(s) IV Push once  dextrose 50% Injectable 25 Gram(s) IV Push once  dextrose 50% Injectable 25 Gram(s) IV Push once  docusate sodium 100 milliGRAM(s) Oral two times a day  finasteride 5 milliGRAM(s) Oral daily  furosemide    Tablet 40 milliGRAM(s) Oral daily  insulin glargine Injectable (LANTUS) 15 Unit(s) SubCutaneous at bedtime  insulin lispro (HumaLOG) corrective regimen sliding scale   SubCutaneous three times a day before meals  insulin lispro (HumaLOG) corrective regimen sliding scale   SubCutaneous at bedtime  insulin lispro Injectable (HumaLOG) 6 Unit(s) SubCutaneous three times a day with meals  isosorbide   mononitrate ER Tablet (IMDUR) 60 milliGRAM(s) Oral daily  multivitamin 1 Tablet(s) Oral daily  pantoprazole    Tablet 40 milliGRAM(s) Oral before breakfast  piperacillin/tazobactam IVPB. 3.375 Gram(s) IV Intermittent every 8 hours  predniSONE   Tablet 5 milliGRAM(s) Oral daily  tacrolimus 1.5 milliGRAM(s) Oral every 12 hours  tamsulosin 0.4 milliGRAM(s) Oral at bedtime  trimethoprim   80 mG/sulfamethoxazole 400 mG 1 Tablet(s) Oral daily  valACYclovir 500 milliGRAM(s) Oral daily  vancomycin  IVPB 1000 milliGRAM(s) IV Intermittent every 12 hours    MEDICATIONS  (PRN):  dextrose 40% Gel 15 Gram(s) Oral once PRN Blood Glucose LESS THAN 70 milliGRAM(s)/deciliter  glucagon  Injectable 1 milliGRAM(s) IntraMuscular once PRN Glucose LESS THAN 70 milligrams/deciliter      Vital Signs Last 24 Hrs  T(C): 36.1 (06 Nov 2018 05:36), Max: 36.8 (05 Nov 2018 14:08)  T(F): 97 (06 Nov 2018 05:36), Max: 98.3 (05 Nov 2018 14:08)  HR: 56 (06 Nov 2018 07:20) (53 - 72)  BP: 136/66 (06 Nov 2018 07:20) (136/66 - 170/80)  BP(mean): --  RR: 18 (06 Nov 2018 05:36) (18 - 20)  SpO2: 97% (06 Nov 2018 05:36) (92% - 99%)  CAPILLARY BLOOD GLUCOSE      POCT Blood Glucose.: 210 mg/dL (06 Nov 2018 07:41)  POCT Blood Glucose.: 195 mg/dL (05 Nov 2018 21:49)  POCT Blood Glucose.: 174 mg/dL (05 Nov 2018 16:32)  POCT Blood Glucose.: 266 mg/dL (05 Nov 2018 11:34)    I&O's Summary    05 Nov 2018 07:01  -  06 Nov 2018 07:00  --------------------------------------------------------  IN: 1216 mL / OUT: 1550 mL / NET: -334 mL    06 Nov 2018 07:01  -  06 Nov 2018 08:23  --------------------------------------------------------  IN: 0 mL / OUT: 250 mL / NET: -250 mL        PHYSICAL EXAM:  GENERAL: NAD, well-developed  EYES: EOMI, PERRLA, conjunctiva and sclera clear  NECK:  No JVD  CHEST/LUNG: CTABL ; No wheeze  HEART: irregular rate; No murmurs  ABDOMEN: Soft, Nontender, Nondistended; Bowel sounds present; wound vac present, does not look infected   : No Trimble  EXTREMITIES:  2+ Peripheral Pulses, b/l trace edema up to mid shin  PSYCH: AAOx3  NEUROLOGY: non-focal, full range of motion of R foot   SKIN: diabetic ulcers of R big toe, on the dorsal aspect of R foot, erythema improving of R foot    LABS:                        10.9   5.01  )-----------( 165      ( 05 Nov 2018 07:59 )             34.2     11-05    134<L>  |  105  |  29<H>  ----------------------------<  179<H>  5.2   |  14<L>  |  1.30    Ca    9.4      05 Nov 2018 07:11  Phos  3.0     11-05  Mg     2.0     11-05    TPro  6.3  /  Alb  3.4  /  TBili  0.8  /  DBili  x   /  AST  20  /  ALT  13  /  AlkPhos  114  11-04

## 2018-11-06 NOTE — PROGRESS NOTE ADULT - ASSESSMENT
Kidney recipient with fever, right toe ulcer, abdominal fluid collection.  Blood and urine cultures negative, intrabdominal collection unchanged

## 2018-11-06 NOTE — DIETITIAN INITIAL EVALUATION ADULT. - OTHER INFO
Pt seen for consult for "pressure injury stage 2 or greater." Pt reports his usual wt is about 260 pounds but it has been increasing due to fluid and had been as high as 275 pounds previously and then he was back down to 260 pounds, noted on admission wt of 275.4 pounds. Pt reports NKFA. States he was taking Enma-naina or Nephro-naina. Pt reports he would like to lose wt. Reports he is unable to do much physical activity due to needing to stay off of the ulcer on his foot, states he has been using the stationary bicycle at home and after discussion c RD will discuss using weights with doctors as well.

## 2018-11-06 NOTE — DIETITIAN INITIAL EVALUATION ADULT. - ORAL INTAKE PTA
good/pt c good appetite and intake PTA- reports consuming 3 meals daily, consistently was consuming CHO c Prot at all meals; pt even able to verbalize "My Plate" model and how he was following it at home

## 2018-11-06 NOTE — DIETITIAN INITIAL EVALUATION ADULT. - NS AS NUTRI INTERV ED CONTENT
Reinforced diet adherence, discussed portion control and doing activities to promote burning calories as approved by MD.

## 2018-11-06 NOTE — DIETITIAN INITIAL EVALUATION ADULT. - FACTORS AFF FOOD INTAKE
pt reports in house good appetite and intake; reports no issues chewing/swallowing difficulty; noted last BM 11/5

## 2018-11-06 NOTE — PROGRESS NOTE ADULT - SUBJECTIVE AND OBJECTIVE BOX
INFECTIOUS DISEASES FOLLOW UP--David Bravo MD  Pager [phone]    This is a follow up note for this  68y Male with  R ankle region inflammation.  Much improved.   Good ROM and very little erythema.  No other new complaints.    Further ROS:  CONSTITUTIONAL:  No fever, good appetite    Allergies  No Known Allergies    ANTIBIOTICS/RELEVANT:  antimicrobials  piperacillin/tazobactam IVPB. 3.375 Gram(s) IV Intermittent every 8 hours  trimethoprim   80 mG/sulfamethoxazole 400 mG 1 Tablet(s) Oral daily  valACYclovir 500 milliGRAM(s) Oral daily  vancomycin  IVPB 1000 milliGRAM(s) IV Intermittent every 12 hours    immunologic:  tacrolimus 1.5 milliGRAM(s) Oral every 12 hours    OTHER:  amLODIPine   Tablet 2.5 milliGRAM(s) Oral daily  apixaban 5 milliGRAM(s) Oral every 12 hours  aspirin enteric coated 81 milliGRAM(s) Oral daily  atorvastatin 20 milliGRAM(s) Oral at bedtime  dextrose 40% Gel 15 Gram(s) Oral once PRN  dextrose 5%. 1000 milliLiter(s) IV Continuous <Continuous>  dextrose 50% Injectable 12.5 Gram(s) IV Push once  dextrose 50% Injectable 25 Gram(s) IV Push once  dextrose 50% Injectable 25 Gram(s) IV Push once  docusate sodium 100 milliGRAM(s) Oral two times a day  finasteride 5 milliGRAM(s) Oral daily  furosemide    Tablet 40 milliGRAM(s) Oral daily  glucagon  Injectable 1 milliGRAM(s) IntraMuscular once PRN  insulin glargine Injectable (LANTUS) 15 Unit(s) SubCutaneous at bedtime  insulin lispro (HumaLOG) corrective regimen sliding scale   SubCutaneous three times a day before meals  insulin lispro (HumaLOG) corrective regimen sliding scale   SubCutaneous at bedtime  insulin lispro Injectable (HumaLOG) 6 Unit(s) SubCutaneous three times a day with meals  isosorbide   mononitrate ER Tablet (IMDUR) 60 milliGRAM(s) Oral daily  multivitamin 1 Tablet(s) Oral daily  pantoprazole    Tablet 40 milliGRAM(s) Oral before breakfast  predniSONE   Tablet 5 milliGRAM(s) Oral daily  tamsulosin 0.4 milliGRAM(s) Oral at bedtime    Objective:  Vital Signs Last 24 Hrs  T(C): 36.1 (06 Nov 2018 05:36), Max: 36.8 (05 Nov 2018 14:08)  T(F): 97 (06 Nov 2018 05:36), Max: 98.3 (05 Nov 2018 14:08)  HR: 56 (06 Nov 2018 07:20) (53 - 72)  BP: 136/66 (06 Nov 2018 07:20) (136/66 - 160/72)  BP(mean): --  RR: 18 (06 Nov 2018 05:36) (18 - 20)  SpO2: 97% (06 Nov 2018 05:36) (92% - 99%)    PHYSICAL EXAM:    R ankle with good rom.  iv sites ok  not tender LLQ/abd    LABS:                        11.4   4.7   )-----------( 163      ( 06 Nov 2018 08:58 )             35.1     11-06    137  |  104  |  28<H>  ----------------------------<  222<H>  4.0   |  22  |  1.51<H>    Ca    9.5      06 Nov 2018 09:01  Phos  2.6     11-06  Mg     1.9     11-06    MICROBIOLOGY: no + Cx
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
## 2018-11-06 NOTE — DIETITIAN INITIAL EVALUATION ADULT. - PROBLEM SELECTOR PLAN 1
Patient with R ankle swelling, erythema with pain on ROM  Differential includes cellulitis vs. septic joint  XRAY no evidence of OM or soft tissue swelling   Will get US of ankle to rule out fluid collection, if present will need aspiration to rule out septic joint   ID consult in AM given hx of renal transplant on immunosuppression   Ortho consult in AM  ESR, CRP   Will continue Vancomycin and change to Zosyn for anaerobic coverage   Will order Vanc level in AM and dose accordingly   F/u Blood Cx, UCx   Lactate wnl

## 2018-11-06 NOTE — PROGRESS NOTE ADULT - PROBLEM SELECTOR PLAN 3
Currently HD stable, renal function SCr 1.3-1.5 at baseline, With wound vac, appears at baseline per patient  Patient on tacrolimus, Cellcept, prednisone  If decompensates will start stress dose steroids  F/u Tacrolimus level, Will continue immunosuppression meds for now  Hx of dehiscence post renal transplant surgery, please follow up with them  Will get abdominal ultrasound to r/o perinephric abscess  Wound care consulted, will f/u recs - elevated Cr at 1.51 possibly 2/2 vanc?  - Currently HD stable, renal function SCr 1.3-1.5 at baseline, With wound vac, appears at baseline per patient  Patient on tacrolimus, Cellcept, prednisone  If decompensates will start stress dose steroids  F/u Tacrolimus level, Will continue immunosuppression meds for now  Will get abdominal ultrasound to r/o perinephric abscess  Wound care consulted, will f/u recs

## 2018-11-06 NOTE — DIETITIAN INITIAL EVALUATION ADULT. - PROBLEM SELECTOR PROBLEM 6
Type 2 diabetes mellitus with diabetic peripheral angiopathy without gangrene, with long-term current use of insulin

## 2018-11-06 NOTE — PROGRESS NOTE ADULT - PROBLEM SELECTOR PLAN 4
Patient with RT toe ulcer, followed by Wound care as outpatient, wound care consulted   Active serosanguinous drainage  No edema, TTP, erythema, less likely to be source of sepsis   No acute findings on xray

## 2018-11-06 NOTE — DIETITIAN INITIAL EVALUATION ADULT. - ADHERENCE
consistent CHO diet, DASH diet; pt also reports he was told my his transplant doctors to consume no more than 36ounces of fluid per day; pt shared his glucometer info c RD- average Finger sticks for the last 90 days as per meter is 160- reports his A1C of 6.5% previously, noted most recent A1C in house is 7.4%-indicating good glycemic control; pt reports he does not add any salt at the table and avoids high salt foods; reports he has also been following food safety precautions as set by his transplant center at [facility]/good

## 2018-11-07 DIAGNOSIS — L02.91 CUTANEOUS ABSCESS, UNSPECIFIED: ICD-10-CM

## 2018-11-07 LAB
ANION GAP SERPL CALC-SCNC: 10 MMOL/L — SIGNIFICANT CHANGE UP (ref 5–17)
APTT BLD: 31.5 SEC — SIGNIFICANT CHANGE UP (ref 27.5–36.3)
BUN SERPL-MCNC: 26 MG/DL — HIGH (ref 7–23)
CALCIUM SERPL-MCNC: 9.6 MG/DL — SIGNIFICANT CHANGE UP (ref 8.4–10.5)
CHLORIDE SERPL-SCNC: 105 MMOL/L — SIGNIFICANT CHANGE UP (ref 96–108)
CO2 SERPL-SCNC: 24 MMOL/L — SIGNIFICANT CHANGE UP (ref 22–31)
CREAT SERPL-MCNC: 1.31 MG/DL — HIGH (ref 0.5–1.3)
GLUCOSE SERPL-MCNC: 128 MG/DL — HIGH (ref 70–99)
INR BLD: 1.66 RATIO — HIGH (ref 0.88–1.16)
MAGNESIUM SERPL-MCNC: 1.9 MG/DL — SIGNIFICANT CHANGE UP (ref 1.6–2.6)
OSMOLALITY UR: 513 MOS/KG — SIGNIFICANT CHANGE UP (ref 50–1200)
PHOSPHATE SERPL-MCNC: 2.4 MG/DL — LOW (ref 2.5–4.5)
POTASSIUM SERPL-MCNC: 3.9 MMOL/L — SIGNIFICANT CHANGE UP (ref 3.5–5.3)
POTASSIUM SERPL-SCNC: 3.9 MMOL/L — SIGNIFICANT CHANGE UP (ref 3.5–5.3)
PROTHROM AB SERPL-ACNC: 19.2 SEC — HIGH (ref 10–12.9)
SODIUM SERPL-SCNC: 139 MMOL/L — SIGNIFICANT CHANGE UP (ref 135–145)
TACROLIMUS SERPL-MCNC: 12.5 NG/ML — SIGNIFICANT CHANGE UP
UUN UR-MCNC: 873 MG/DL — SIGNIFICANT CHANGE UP

## 2018-11-07 PROCEDURE — 99232 SBSQ HOSP IP/OBS MODERATE 35: CPT

## 2018-11-07 PROCEDURE — 99233 SBSQ HOSP IP/OBS HIGH 50: CPT

## 2018-11-07 RX ORDER — HEPARIN SODIUM 5000 [USP'U]/ML
5000 INJECTION INTRAVENOUS; SUBCUTANEOUS ONCE
Qty: 0 | Refills: 0 | Status: COMPLETED | OUTPATIENT
Start: 2018-11-07 | End: 2018-11-07

## 2018-11-07 RX ORDER — INSULIN GLARGINE 100 [IU]/ML
18 INJECTION, SOLUTION SUBCUTANEOUS AT BEDTIME
Qty: 0 | Refills: 0 | Status: DISCONTINUED | OUTPATIENT
Start: 2018-11-07 | End: 2018-11-10

## 2018-11-07 RX ADMIN — Medication 1 TABLET(S): at 12:20

## 2018-11-07 RX ADMIN — Medication 500 MILLIGRAM(S): at 13:49

## 2018-11-07 RX ADMIN — MYCOPHENOLATE MOFETIL 750 MILLIGRAM(S): 250 CAPSULE ORAL at 17:01

## 2018-11-07 RX ADMIN — Medication 1 TABLET(S): at 12:46

## 2018-11-07 RX ADMIN — Medication 81 MILLIGRAM(S): at 12:19

## 2018-11-07 RX ADMIN — Medication 40 MILLIGRAM(S): at 05:30

## 2018-11-07 RX ADMIN — APIXABAN 5 MILLIGRAM(S): 2.5 TABLET, FILM COATED ORAL at 05:29

## 2018-11-07 RX ADMIN — Medication 9 UNIT(S): at 12:19

## 2018-11-07 RX ADMIN — ISOSORBIDE MONONITRATE 60 MILLIGRAM(S): 60 TABLET, EXTENDED RELEASE ORAL at 12:22

## 2018-11-07 RX ADMIN — PANTOPRAZOLE SODIUM 40 MILLIGRAM(S): 20 TABLET, DELAYED RELEASE ORAL at 05:29

## 2018-11-07 RX ADMIN — Medication 2: at 16:51

## 2018-11-07 RX ADMIN — ATORVASTATIN CALCIUM 20 MILLIGRAM(S): 80 TABLET, FILM COATED ORAL at 21:19

## 2018-11-07 RX ADMIN — Medication 2: at 12:18

## 2018-11-07 RX ADMIN — TACROLIMUS 1.5 MILLIGRAM(S): 5 CAPSULE ORAL at 05:29

## 2018-11-07 RX ADMIN — HEPARIN SODIUM 5000 UNIT(S): 5000 INJECTION INTRAVENOUS; SUBCUTANEOUS at 21:19

## 2018-11-07 RX ADMIN — Medication 9 UNIT(S): at 08:25

## 2018-11-07 RX ADMIN — Medication 100 MILLIGRAM(S): at 05:29

## 2018-11-07 RX ADMIN — FINASTERIDE 5 MILLIGRAM(S): 5 TABLET, FILM COATED ORAL at 12:20

## 2018-11-07 RX ADMIN — Medication 500 MILLIGRAM(S): at 05:30

## 2018-11-07 RX ADMIN — TAMSULOSIN HYDROCHLORIDE 0.4 MILLIGRAM(S): 0.4 CAPSULE ORAL at 21:19

## 2018-11-07 RX ADMIN — Medication 500 MILLIGRAM(S): at 21:19

## 2018-11-07 RX ADMIN — Medication 100 MILLIGRAM(S): at 17:01

## 2018-11-07 RX ADMIN — Medication 9 UNIT(S): at 16:51

## 2018-11-07 RX ADMIN — TACROLIMUS 1.5 MILLIGRAM(S): 5 CAPSULE ORAL at 16:56

## 2018-11-07 RX ADMIN — VALACYCLOVIR 500 MILLIGRAM(S): 500 TABLET, FILM COATED ORAL at 12:20

## 2018-11-07 RX ADMIN — MYCOPHENOLATE MOFETIL 750 MILLIGRAM(S): 250 CAPSULE ORAL at 05:29

## 2018-11-07 RX ADMIN — INSULIN GLARGINE 18 UNIT(S): 100 INJECTION, SOLUTION SUBCUTANEOUS at 22:24

## 2018-11-07 RX ADMIN — Medication 5 MILLIGRAM(S): at 05:29

## 2018-11-07 NOTE — PHYSICAL THERAPY INITIAL EVALUATION ADULT - PERTINENT HX OF CURRENT PROBLEM, REHAB EVAL
67 yo M PMHx DM2 (on insulin), HTN, HLD, afib, CAD s/p 2 vessel CABG in 2009, SALVADOR on CPAP, ESRD (2/2 DM) previously on HD x 4.5 yr previously s/p L side DDRT on 7/7/18 by Dr. Oswaldo Castellon at [facility] in [place], [place], recent admission in October for sepsis 2/2 pyelonephritis and abd wall abscess s/p IR drainage of a perinephric abscess and wound vac placement presenting with fever 101.4 today at home. A/w sepsis likely 2/2 cellulitis vs. septic joint, less likely septic joint. Cont'd.

## 2018-11-07 NOTE — PROGRESS NOTE ADULT - ASSESSMENT
Imp/.Rx:  The right ankle process appears to be under control.  I think we need to have a better understanding as to what is occuring in the region of the kidney with this new collection.  Role of ongoing abx equivocal---    can the collection at [facility] be accessed by IR?

## 2018-11-07 NOTE — PROGRESS NOTE ADULT - PROBLEM SELECTOR PLAN 6
Hx of DM2, on insulin  Has subcutaneous glucose monitoring device   ISS, FS  A1c 7.4  lantus increased to 23 and 9 premeal ordered

## 2018-11-07 NOTE — PROGRESS NOTE ADULT - SUBJECTIVE AND OBJECTIVE BOX
INFECTIOUS DISEASES FOLLOW UP--David Bravo MD  Pager [phone]    This is a follow up note for this  68y Male with possible R ankle area  Cellulitis.  No complaints at ankle region.  events noted with new collection at the RLQ.  No new symptoms.    Further ROS:  CONSTITUTIONAL:  No fever, good appetite  CARDIOVASCULAR:  No chest pain or palpitations  RESPIRATORY:  No dyspnea  GASTROINTESTINAL:  No nausea, vomiting, diarrhea, or abdominal pain  GENITOURINARY:  No dysuria  NEUROLOGIC:  No headache,     Allergies  No Known Allergies    ANTIBIOTICS/RELEVANT:  antimicrobials  cephalexin 500 milliGRAM(s) Oral every 8 hours  trimethoprim   80 mG/sulfamethoxazole 400 mG 1 Tablet(s) Oral daily  valACYclovir 500 milliGRAM(s) Oral daily    immunologic:  mycophenolate mofetil 750 milliGRAM(s) Oral two times a day  tacrolimus 1.5 milliGRAM(s) Oral every 12 hours    OTHER:  amLODIPine   Tablet 2.5 milliGRAM(s) Oral daily  apixaban 5 milliGRAM(s) Oral every 12 hours  aspirin enteric coated 81 milliGRAM(s) Oral daily  atorvastatin 20 milliGRAM(s) Oral at bedtime  dextrose 40% Gel 15 Gram(s) Oral once PRN  dextrose 5%. 1000 milliLiter(s) IV Continuous <Continuous>  dextrose 50% Injectable 12.5 Gram(s) IV Push once  dextrose 50% Injectable 25 Gram(s) IV Push once  dextrose 50% Injectable 25 Gram(s) IV Push once  docusate sodium 100 milliGRAM(s) Oral two times a day  finasteride 5 milliGRAM(s) Oral daily  furosemide    Tablet 40 milliGRAM(s) Oral daily  glucagon  Injectable 1 milliGRAM(s) IntraMuscular once PRN  insulin glargine Injectable (LANTUS) 23 Unit(s) SubCutaneous at bedtime  insulin lispro (HumaLOG) corrective regimen sliding scale   SubCutaneous three times a day before meals  insulin lispro (HumaLOG) corrective regimen sliding scale   SubCutaneous at bedtime  insulin lispro Injectable (HumaLOG) 9 Unit(s) SubCutaneous three times a day with meals  isosorbide   mononitrate ER Tablet (IMDUR) 60 milliGRAM(s) Oral daily  multivitamin 1 Tablet(s) Oral daily  pantoprazole    Tablet 40 milliGRAM(s) Oral before breakfast  predniSONE   Tablet 5 milliGRAM(s) Oral daily  tamsulosin 0.4 milliGRAM(s) Oral at bedtime    Objective:  Vital Signs Last 24 Hrs  T(C): 35.8 (07 Nov 2018 04:52), Max: 36.2 (06 Nov 2018 14:00)  T(F): 96.5 (07 Nov 2018 04:52), Max: 97.1 (06 Nov 2018 14:00)  HR: 65 (07 Nov 2018 06:36) (53 - 92)  BP: 155/78 (07 Nov 2018 04:52) (139/67 - 169/86)  BP(mean): --  RR: 18 (07 Nov 2018 04:52) (18 - 18)  SpO2: 98% (07 Nov 2018 06:36) (93% - 99%)    PHYSICAL EXAM:  Constitutional:no acute distress  Eyes:NANCY, EOMI  Ear/Nose/Throat: no oral lesions, 	  Respiratory: clear BL  Cardiovascular: S1S2  Gastrointestinal:soft, (+) BS, no tenderness  Extremities:no e/e/c---good rom at R ankle  No Lymphadenopathy  IV sites not inflammed.    LABS:                        11.4   4.7   )-----------( 163      ( 06 Nov 2018 08:58 )             35.1     11-06    137  |  104  |  28<H>  ----------------------------<  222<H>  4.0   |  22  |  1.51<H>    Ca    9.5      06 Nov 2018 09:01  Phos  2.6     11-06  Mg     1.9     11-06      RADIOLOGY & ADDITIONAL STUDIES:  < from: US Abdomen Limited (11.06.18 @ 11:56) >    IMPRESSION:  No significant change in the previously seen heterogeneous collection   lateral to the transplant kidney.    New heterogeneous collection in the left lower quadrant in the region of   the transplant kidney measuring up to 6.7 cm.      < end of copied text >

## 2018-11-07 NOTE — PROGRESS NOTE ADULT - SUBJECTIVE AND OBJECTIVE BOX
Steffanie Kim, PGY1  Pager: [phone]  After 7: Night Float pager  Alternate contact:     Patient is a 68y old  Male who presents with a chief complaint of fever (06 Nov 2018 16:20)      SUBJECTIVE / OVERNIGHT EVENTS: No overnight events. Patient feels fine this morning. Patient denies fever, chills, nausea, vomiting. Does not have any abdominal pain. No chest pain or shortness of breath. His ankle pain and swelling is improved. Patient tolerating diet, having regular bowel movements. Patient is urinating okay.     MEDICATIONS  (STANDING):  amLODIPine   Tablet 2.5 milliGRAM(s) Oral daily  apixaban 5 milliGRAM(s) Oral every 12 hours  aspirin enteric coated 81 milliGRAM(s) Oral daily  atorvastatin 20 milliGRAM(s) Oral at bedtime  cephalexin 500 milliGRAM(s) Oral every 8 hours  dextrose 5%. 1000 milliLiter(s) (50 mL/Hr) IV Continuous <Continuous>  dextrose 50% Injectable 12.5 Gram(s) IV Push once  dextrose 50% Injectable 25 Gram(s) IV Push once  dextrose 50% Injectable 25 Gram(s) IV Push once  docusate sodium 100 milliGRAM(s) Oral two times a day  finasteride 5 milliGRAM(s) Oral daily  furosemide    Tablet 40 milliGRAM(s) Oral daily  insulin glargine Injectable (LANTUS) 23 Unit(s) SubCutaneous at bedtime  insulin lispro (HumaLOG) corrective regimen sliding scale   SubCutaneous three times a day before meals  insulin lispro (HumaLOG) corrective regimen sliding scale   SubCutaneous at bedtime  insulin lispro Injectable (HumaLOG) 9 Unit(s) SubCutaneous three times a day with meals  isosorbide   mononitrate ER Tablet (IMDUR) 60 milliGRAM(s) Oral daily  multivitamin 1 Tablet(s) Oral daily  mycophenolate mofetil 750 milliGRAM(s) Oral two times a day  pantoprazole    Tablet 40 milliGRAM(s) Oral before breakfast  predniSONE   Tablet 5 milliGRAM(s) Oral daily  tacrolimus 1.5 milliGRAM(s) Oral every 12 hours  tamsulosin 0.4 milliGRAM(s) Oral at bedtime  trimethoprim   80 mG/sulfamethoxazole 400 mG 1 Tablet(s) Oral daily  valACYclovir 500 milliGRAM(s) Oral daily    MEDICATIONS  (PRN):  dextrose 40% Gel 15 Gram(s) Oral once PRN Blood Glucose LESS THAN 70 milliGRAM(s)/deciliter  glucagon  Injectable 1 milliGRAM(s) IntraMuscular once PRN Glucose LESS THAN 70 milligrams/deciliter      Vital Signs Last 24 Hrs  T(C): 35.8 (07 Nov 2018 04:52), Max: 36.2 (06 Nov 2018 14:00)  T(F): 96.5 (07 Nov 2018 04:52), Max: 97.1 (06 Nov 2018 14:00)  HR: 65 (07 Nov 2018 06:36) (53 - 92)  BP: 155/78 (07 Nov 2018 04:52) (139/67 - 169/86)  BP(mean): --  RR: 18 (07 Nov 2018 04:52) (18 - 18)  SpO2: 98% (07 Nov 2018 06:36) (93% - 99%)  CAPILLARY BLOOD GLUCOSE      POCT Blood Glucose.: 97 mg/dL (07 Nov 2018 07:45)  POCT Blood Glucose.: 119 mg/dL (06 Nov 2018 21:16)  POCT Blood Glucose.: 224 mg/dL (06 Nov 2018 16:24)  POCT Blood Glucose.: 203 mg/dL (06 Nov 2018 12:52)    I&O's Summary    06 Nov 2018 07:01  -  07 Nov 2018 07:00  --------------------------------------------------------  IN: 1080 mL / OUT: 1825 mL / NET: -745 mL        PHYSICAL EXAM:  GENERAL: NAD, well-developed  EYES: EOMI, PERRLA, conjunctiva and sclera clear  NECK:  No JVD  CHEST/LUNG: CTABL; No wheeze  HEART: RRR; No murmurs  ABDOMEN: Soft, Nontender, Nondistended; Bowel sounds present  : No Trimble  EXTREMITIES:  2+ Peripheral Pulses, 2+ pitting edema up to knees  PSYCH: AAOx3  NEUROLOGY: non-focal  SKIN: Patient has an ulcer on the base of the R big toe. Another ulcer at the dorsal aspect of the right foot. Noninfected looking. No sacral ulcer    LABS:                        11.4   4.7   )-----------( 163      ( 06 Nov 2018 08:58 )             35.1     11-06    137  |  104  |  28<H>  ----------------------------<  222<H>  4.0   |  22  |  1.51<H>    Ca    9.5      06 Nov 2018 09:01  Phos  2.6     11-06  Mg     1.9     11-06            < from: US Abdomen Limited (11.06.18 @ 11:56) >  COMPARISON: Ultrasound 10/11/2018.    FINDINGS:  Limited views of the transplant kidney in the left lower quadrant  demonstrates no hydronephrosis.    Lateral to the transplant kidney, there is a 5.9 x 9.5 x 4.1 cm   collection, not significantly changed compared to 10/11/2018. A new   heterogeneous collection is seen in the left lower quadrant in the region   of the transplant kidney, measuring 6.7 x 3.7 x 5.1 cm.    IMPRESSION:  No significant change in the previously seen heterogeneous collection   lateral to the transplant kidney.    New heterogeneous collection in the left lower quadrant in the region of   the transplant kidney measuring up to 6.7 cm.      < end of copied text >

## 2018-11-07 NOTE — PHYSICAL THERAPY INITIAL EVALUATION ADULT - MODALITIES TREATMENT COMMENTS
LLQ surgical wound measuring 2 cm x 1 cm x 1.8 cm, 100% beefy red granulation tissue. No purulence, no erythema, no malodor.

## 2018-11-07 NOTE — CHART NOTE - NSCHARTNOTEFT_GEN_A_CORE
PRE-INTERVENTIONAL RADIOLOGY PROCEDURE NOTE      Patient Age: 68    Patient Gender: M     Procedure: IR abscess drainage     Diagnosis/Indication: Intraabdominal abscess    Interventional Radiology Attending Physician: Dr. Diez    Ordering Attending Physician: Dr. Kim    Pertinent Medical History: Renal transplant, DM, afib    Pertinent labs:                      11.4   4.4   )-----------( 183      ( 07 Nov 2018 09:46 )             35.5       11-07    139  |  105  |  26<H>  ----------------------------<  128<H>  3.9   |  24  |  1.31<H>    Ca    9.6      07 Nov 2018 09:25  Phos  2.4     11-07  Mg     1.9     11-07        PT/INR - ( 07 Nov 2018 09:25 )   PT: 19.2 sec;   INR: 1.66 ratio         PTT - ( 07 Nov 2018 09:25 )  PTT:31.5 sec        Patient and Family Aware ? Yes

## 2018-11-07 NOTE — PROGRESS NOTE ADULT - PROBLEM SELECTOR PLAN 3
- elevated Cr at 1.51 possibly 2/2 vanc?  - Currently HD stable, renal function SCr 1.3-1.5 at baseline, With wound vac, appears at baseline per patient  Patient on tacrolimus, Cellcept, prednisone  If decompensates will start stress dose steroids  F/u Tacrolimus level, Will continue immunosuppression meds for now  abdominal ultrasound to shows new perinephric abscess  Wound care consulted, will f/u recs

## 2018-11-07 NOTE — PHYSICAL THERAPY INITIAL EVALUATION ADULT - PRECAUTIONS/LIMITATIONS, REHAB EVAL
Xray R foot/ankle: No radiographic evidence of advanced osteomyelitis. US Abdomen: No significant change in the previously seen heterogeneous collection lateral to the transplant kidney. New heterogeneous collection in the left lower quadrant in the region of the transplant kidney measuring up to 6.7 cm. Patient reports he saw Podiatry on day of admission for his RT toe ulcer, with dressing changed today. Was walking home and began to have increased ankle swelling, and then while walking up stairs and pain at the Rt ankle. He denies known trauma to the area. He later awoke from the nap with a Tmax of 101.6 and came to the ED. Of note, patient's post op course for renal transplant was complicated by wound dehiscence on 8/1/2018 and subsequent wound vac placement.  Pt follows up at [facility] 1x a week for vac change, and a home visiting nurse comes to change the vac 2x a week (total 3x week vac change, last changed today (11/3/18). On past hospitalization 2 weeks prior, patient was found to have CT evidence of "left pelvic renal transplant with severe diffuse perinephric edema and perinephric pelvic fluid collection concerning for abscess secondary to a severe pyelonephritis. Left anterior abdominal wall rectus muscle abscess with air with a sinus track to left anterior abdominal wall. no known precautions/limitations/Xray R foot/ankle: No radiographic evidence of advanced osteomyelitis. US Abdomen: No significant change in the previously seen heterogeneous collection lateral to the transplant kidney. New heterogeneous collection in the left lower quadrant in the region of the transplant kidney measuring up to 6.7 cm. Patient reports he saw Podiatry on day of admission for his RT toe ulcer, with dressing changed today. Was walking home and began to have increased ankle swelling, and then while walking up stairs and pain at the Rt ankle. He denies known trauma to the area. He later awoke from the nap with a Tmax of 101.6 and came to the ED. Of note, patient's post op course for renal transplant was complicated by wound dehiscence on 8/1/2018 and subsequent wound vac placement.  Pt follows up at [facility] 1x a week for vac change, and a home visiting nurse comes to change the vac 2x a week (total 3x week vac change, last changed today (11/3/18). On past hospitalization 2 weeks prior, patient was found to have CT evidence of "left pelvic renal transplant with severe diffuse perinephric edema and perinephric pelvic fluid collection concerning for abscess secondary to a severe pyelonephritis. Left anterior abdominal wall rectus muscle abscess with air with a sinus track to left anterior abdominal wall.

## 2018-11-07 NOTE — PROGRESS NOTE ADULT - PROBLEM SELECTOR PLAN 1
-New heterogeneous collection in the left lower quadrant in the region of   the transplant kidney measuring up to 6.7 cm.  -No significant change in the previously seen heterogeneous collection   lateral to the transplant kidney.  -Will consult IR for possible drainage

## 2018-11-07 NOTE — PROGRESS NOTE ADULT - ASSESSMENT
68M with PMHx of DM2 (on insulin), HTN, HLD, afib on Eliquis, CAD s/p 2 vessel CABG in 2009, SALVADOR on CPAP, ESRD (2/2 DM) previously on HD x 4.5 yr previously s/p L side DDRT on  7/7/18 by Dr. Oswaldo Castellon at [facility] in [place], [place], with recent admission in October for sepsis 2/2 pyelonephritis and abd wall abscess s/p IR drainage of a perinephric abscess and wound vac placement presenting with fever 101.4 admitted for sepsis likely 2/2 cellulitis vs. septic joint with new fluid collection in LLQ found on abdominal US. 68M with PMHx of DM2 (on insulin), HTN, HLD, afib on Eliquis, CAD s/p 2 vessel CABG in 2009, SALVADOR on CPAP, ESRD (2/2 DM) previously on HD x 4.5 yr previously s/p L side DDRT on  7/7/18 by Dr. Oswaldo Castellon at [facility] in [place], [place], with recent admission in October for sepsis 2/2 pyelonephritis and abd wall abscess s/p IR drainage of a perinephric abscess and wound vac placement presenting with fever 101.4 admitted for sepsis likely 2/2 cellulitis with new fluid collection in LLQ found on abdominal US.

## 2018-11-07 NOTE — PROGRESS NOTE ADULT - PROBLEM SELECTOR PLAN 2
Patient with R ankle swelling, erythema with pain on ROM, now improving  Differential includes cellulitis vs. septic joint, less likely septic joint  XRAY no evidence of OM or soft tissue swelling, pending US  Ortho No acute orthopedic surgical intervention at this time, less likely septic joint given clinical improvement   As per ID, patient switched to Keflex, day 2/5  ESR, CRP elevated 7.38

## 2018-11-08 LAB
ALBUMIN SERPL ELPH-MCNC: 3.5 G/DL — SIGNIFICANT CHANGE UP (ref 3.3–5)
ALP SERPL-CCNC: 112 U/L — SIGNIFICANT CHANGE UP (ref 40–120)
ALT FLD-CCNC: 16 U/L — SIGNIFICANT CHANGE UP (ref 10–45)
ANION GAP SERPL CALC-SCNC: 12 MMOL/L — SIGNIFICANT CHANGE UP (ref 5–17)
APTT BLD: 30.8 SEC — SIGNIFICANT CHANGE UP (ref 27.5–36.3)
AST SERPL-CCNC: 21 U/L — SIGNIFICANT CHANGE UP (ref 10–40)
BILIRUB SERPL-MCNC: 0.6 MG/DL — SIGNIFICANT CHANGE UP (ref 0.2–1.2)
BUN SERPL-MCNC: 23 MG/DL — SIGNIFICANT CHANGE UP (ref 7–23)
CALCIUM SERPL-MCNC: 10 MG/DL — SIGNIFICANT CHANGE UP (ref 8.4–10.5)
CHLORIDE SERPL-SCNC: 107 MMOL/L — SIGNIFICANT CHANGE UP (ref 96–108)
CO2 SERPL-SCNC: 23 MMOL/L — SIGNIFICANT CHANGE UP (ref 22–31)
CREAT SERPL-MCNC: 1.33 MG/DL — HIGH (ref 0.5–1.3)
CULTURE RESULTS: SIGNIFICANT CHANGE UP
CULTURE RESULTS: SIGNIFICANT CHANGE UP
GLUCOSE SERPL-MCNC: 99 MG/DL — SIGNIFICANT CHANGE UP (ref 70–99)
HCT VFR BLD CALC: 35.2 % — LOW (ref 39–50)
HGB BLD-MCNC: 11.1 G/DL — LOW (ref 13–17)
INR BLD: 1.45 RATIO — HIGH (ref 0.88–1.16)
MAGNESIUM SERPL-MCNC: 2 MG/DL — SIGNIFICANT CHANGE UP (ref 1.6–2.6)
MCHC RBC-ENTMCNC: 29.7 PG — SIGNIFICANT CHANGE UP (ref 27–34)
MCHC RBC-ENTMCNC: 31.5 GM/DL — LOW (ref 32–36)
MCV RBC AUTO: 94.1 FL — SIGNIFICANT CHANGE UP (ref 80–100)
PHOSPHATE SERPL-MCNC: 2.9 MG/DL — SIGNIFICANT CHANGE UP (ref 2.5–4.5)
PLATELET # BLD AUTO: 185 K/UL — SIGNIFICANT CHANGE UP (ref 150–400)
POTASSIUM SERPL-MCNC: 3.7 MMOL/L — SIGNIFICANT CHANGE UP (ref 3.5–5.3)
POTASSIUM SERPL-SCNC: 3.7 MMOL/L — SIGNIFICANT CHANGE UP (ref 3.5–5.3)
PROT SERPL-MCNC: 6.4 G/DL — SIGNIFICANT CHANGE UP (ref 6–8.3)
PROTHROM AB SERPL-ACNC: 16.7 SEC — HIGH (ref 10–13.1)
RBC # BLD: 3.74 M/UL — LOW (ref 4.2–5.8)
RBC # FLD: 15.7 % — HIGH (ref 10.3–14.5)
SODIUM SERPL-SCNC: 142 MMOL/L — SIGNIFICANT CHANGE UP (ref 135–145)
SPECIMEN SOURCE: SIGNIFICANT CHANGE UP
SPECIMEN SOURCE: SIGNIFICANT CHANGE UP
TACROLIMUS SERPL-MCNC: 9.8 NG/ML — SIGNIFICANT CHANGE UP
WBC # BLD: 4.13 K/UL — SIGNIFICANT CHANGE UP (ref 3.8–10.5)
WBC # FLD AUTO: 4.13 K/UL — SIGNIFICANT CHANGE UP (ref 3.8–10.5)

## 2018-11-08 PROCEDURE — 99233 SBSQ HOSP IP/OBS HIGH 50: CPT

## 2018-11-08 PROCEDURE — 99232 SBSQ HOSP IP/OBS MODERATE 35: CPT

## 2018-11-08 RX ORDER — INSULIN LISPRO 100/ML
VIAL (ML) SUBCUTANEOUS
Qty: 0 | Refills: 0 | Status: DISCONTINUED | OUTPATIENT
Start: 2018-11-08 | End: 2018-11-10

## 2018-11-08 RX ORDER — INSULIN LISPRO 100/ML
VIAL (ML) SUBCUTANEOUS EVERY 6 HOURS
Qty: 0 | Refills: 0 | Status: DISCONTINUED | OUTPATIENT
Start: 2018-11-08 | End: 2018-11-08

## 2018-11-08 RX ORDER — INSULIN LISPRO 100/ML
9 VIAL (ML) SUBCUTANEOUS
Qty: 0 | Refills: 0 | Status: DISCONTINUED | OUTPATIENT
Start: 2018-11-08 | End: 2018-11-08

## 2018-11-08 RX ADMIN — Medication 5 MILLIGRAM(S): at 06:43

## 2018-11-08 RX ADMIN — Medication 9 UNIT(S): at 17:01

## 2018-11-08 RX ADMIN — AMLODIPINE BESYLATE 2.5 MILLIGRAM(S): 2.5 TABLET ORAL at 06:43

## 2018-11-08 RX ADMIN — PANTOPRAZOLE SODIUM 40 MILLIGRAM(S): 20 TABLET, DELAYED RELEASE ORAL at 08:14

## 2018-11-08 RX ADMIN — VALACYCLOVIR 500 MILLIGRAM(S): 500 TABLET, FILM COATED ORAL at 11:26

## 2018-11-08 RX ADMIN — Medication 100 MILLIGRAM(S): at 06:43

## 2018-11-08 RX ADMIN — MYCOPHENOLATE MOFETIL 750 MILLIGRAM(S): 250 CAPSULE ORAL at 06:44

## 2018-11-08 RX ADMIN — ATORVASTATIN CALCIUM 20 MILLIGRAM(S): 80 TABLET, FILM COATED ORAL at 21:12

## 2018-11-08 RX ADMIN — TACROLIMUS 1.5 MILLIGRAM(S): 5 CAPSULE ORAL at 22:13

## 2018-11-08 RX ADMIN — ISOSORBIDE MONONITRATE 60 MILLIGRAM(S): 60 TABLET, EXTENDED RELEASE ORAL at 11:25

## 2018-11-08 RX ADMIN — TAMSULOSIN HYDROCHLORIDE 0.4 MILLIGRAM(S): 0.4 CAPSULE ORAL at 21:11

## 2018-11-08 RX ADMIN — Medication 500 MILLIGRAM(S): at 06:43

## 2018-11-08 RX ADMIN — MYCOPHENOLATE MOFETIL 750 MILLIGRAM(S): 250 CAPSULE ORAL at 17:02

## 2018-11-08 RX ADMIN — Medication 1 TABLET(S): at 11:25

## 2018-11-08 RX ADMIN — Medication 500 MILLIGRAM(S): at 13:24

## 2018-11-08 RX ADMIN — Medication 100 MILLIGRAM(S): at 17:02

## 2018-11-08 RX ADMIN — INSULIN GLARGINE 18 UNIT(S): 100 INJECTION, SOLUTION SUBCUTANEOUS at 21:40

## 2018-11-08 RX ADMIN — TACROLIMUS 1.5 MILLIGRAM(S): 5 CAPSULE ORAL at 11:24

## 2018-11-08 RX ADMIN — Medication 500 MILLIGRAM(S): at 21:12

## 2018-11-08 RX ADMIN — FINASTERIDE 5 MILLIGRAM(S): 5 TABLET, FILM COATED ORAL at 11:25

## 2018-11-08 RX ADMIN — Medication 40 MILLIGRAM(S): at 06:43

## 2018-11-08 NOTE — PROGRESS NOTE ADULT - SUBJECTIVE AND OBJECTIVE BOX
Steffanie Kim, PGY1  Pager: [phone]  After 7: Night Float pager  Alternate contact:     Patient is a 68y old  Male who presents with a chief complaint of fever (07 Nov 2018 08:16)      SUBJECTIVE / OVERNIGHT EVENTS: No acute events overnight. Patient feels okay this morning. No fever, chills, nausea, vomiting. No chest pain or shortness of breath. Patient's ankle doing good. Patient walked around the unit yesterday without any problems.     MEDICATIONS  (STANDING):  amLODIPine   Tablet 2.5 milliGRAM(s) Oral daily  aspirin enteric coated 81 milliGRAM(s) Oral daily  atorvastatin 20 milliGRAM(s) Oral at bedtime  cephalexin 500 milliGRAM(s) Oral every 8 hours  dextrose 5%. 1000 milliLiter(s) (50 mL/Hr) IV Continuous <Continuous>  dextrose 50% Injectable 12.5 Gram(s) IV Push once  dextrose 50% Injectable 25 Gram(s) IV Push once  dextrose 50% Injectable 25 Gram(s) IV Push once  docusate sodium 100 milliGRAM(s) Oral two times a day  finasteride 5 milliGRAM(s) Oral daily  furosemide    Tablet 40 milliGRAM(s) Oral daily  insulin glargine Injectable (LANTUS) 18 Unit(s) SubCutaneous at bedtime  insulin lispro (HumaLOG) corrective regimen sliding scale   SubCutaneous at bedtime  insulin lispro Injectable (HumaLOG) 9 Unit(s) SubCutaneous three times a day with meals  isosorbide   mononitrate ER Tablet (IMDUR) 60 milliGRAM(s) Oral daily  multivitamin 1 Tablet(s) Oral daily  mycophenolate mofetil 750 milliGRAM(s) Oral two times a day  pantoprazole    Tablet 40 milliGRAM(s) Oral before breakfast  predniSONE   Tablet 5 milliGRAM(s) Oral daily  tacrolimus 1.5 milliGRAM(s) Oral every 12 hours  tamsulosin 0.4 milliGRAM(s) Oral at bedtime  trimethoprim   80 mG/sulfamethoxazole 400 mG 1 Tablet(s) Oral daily  valACYclovir 500 milliGRAM(s) Oral daily    MEDICATIONS  (PRN):  dextrose 40% Gel 15 Gram(s) Oral once PRN Blood Glucose LESS THAN 70 milliGRAM(s)/deciliter  glucagon  Injectable 1 milliGRAM(s) IntraMuscular once PRN Glucose LESS THAN 70 milligrams/deciliter      Vital Signs Last 24 Hrs  T(C): 36.6 (08 Nov 2018 05:27), Max: 36.6 (07 Nov 2018 17:29)  T(F): 97.8 (08 Nov 2018 05:27), Max: 97.8 (07 Nov 2018 17:29)  HR: 72 (08 Nov 2018 05:38) (58 - 74)  BP: 153/66 (08 Nov 2018 05:27) (135/69 - 164/65)  BP(mean): --  RR: 18 (08 Nov 2018 05:27) (18 - 20)  SpO2: 98% (08 Nov 2018 05:38) (94% - 98%)  CAPILLARY BLOOD GLUCOSE      POCT Blood Glucose.: 130 mg/dL (08 Nov 2018 07:30)  POCT Blood Glucose.: 131 mg/dL (07 Nov 2018 22:21)  POCT Blood Glucose.: 198 mg/dL (07 Nov 2018 16:47)  POCT Blood Glucose.: 156 mg/dL (07 Nov 2018 12:02)    I&O's Summary    07 Nov 2018 07:01  -  08 Nov 2018 07:00  --------------------------------------------------------  IN: 1040 mL / OUT: 1800 mL / NET: -760 mL        PHYSICAL EXAM:  GENERAL: NAD, well-developed  EYES: EOMI, PERRLA, conjunctiva and sclera clear  NECK:  No JVD  CHEST/LUNG: CTABL ; No wheeze  HEART: RRR; No murmurs  ABDOMEN: Soft, Nontender, Nondistended; Bowel sounds present  : Trimble  EXTREMITIES:  2+ Peripheral Pulses, No edema  PSYCH: AAOx3  NEUROLOGY: non-focal  SKIN: No rashes or lesions. No sacral ulcer    LABS:                        11.1   4.13  )-----------( 185      ( 08 Nov 2018 08:17 )             35.2     11-08    142  |  107  |  23  ----------------------------<  99  3.7   |  23  |  1.33<H>    Ca    10.0      08 Nov 2018 07:00  Phos  2.9     11-08  Mg     2.0     11-08    TPro  6.4  /  Alb  3.5  /  TBili  0.6  /  DBili  x   /  AST  21  /  ALT  16  /  AlkPhos  112  11-08    PT/INR - ( 07 Nov 2018 09:25 )   PT: 19.2 sec;   INR: 1.66 ratio         PTT - ( 07 Nov 2018 09:25 )  PTT:31.5 sec          Microbiology;        RADIOLOGY & ADDITIONAL TESTS:    Imaging Personally Reviewed:          Consultant(s) Notes Reviewed:      Care Discussed with Consultants/Other Providers:

## 2018-11-08 NOTE — PROGRESS NOTE ADULT - PROBLEM SELECTOR PLAN 1
-New heterogeneous collection in the left lower quadrant in the region of   the transplant kidney measuring up to 6.7 cm.  -No significant change in the previously seen heterogeneous collection   lateral to the transplant kidney.  -IR for drainage of abscess today
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
## 2018-11-08 NOTE — PROGRESS NOTE ADULT - PROBLEM SELECTOR PLAN 4
Patient with RT toe ulcer, followed by Wound care as outpatient, wound care consulted, stable  Active serosanguinous drainage  No edema, TTP, erythema, less likely to be source of sepsis   No acute findings on xray

## 2018-11-08 NOTE — PROGRESS NOTE ADULT - PROBLEM SELECTOR PLAN 6
Hx of DM2, on insulin  Has subcutaneous glucose monitoring device   ISS, FS  A1c 7.4  lantus increased to 23 and 9 premeal

## 2018-11-08 NOTE — PROGRESS NOTE ADULT - SUBJECTIVE AND OBJECTIVE BOX
INFECTIOUS DISEASES FOLLOW UP--David Bravo MD  Pager [phone]    This is a follow up note for this  68y Male with  possible ankle area cellulitis    Further ROS:  CONSTITUTIONAL:  No fever, good appetite  CARDIOVASCULAR:  No chest pain or palpitations  RESPIRATORY:  No dyspnea  GASTROINTESTINAL:  No nausea, vomiting, diarrhea, or abdominal pain  GENITOURINARY:  No dysuria  NEUROLOGIC:  No headache,     Allergies  No Known Allergies    ANTIBIOTICS/RELEVANT:  antimicrobials  cephalexin 500 milliGRAM(s) Oral every 8 hours  trimethoprim   80 mG/sulfamethoxazole 400 mG 1 Tablet(s) Oral daily  valACYclovir 500 milliGRAM(s) Oral daily    immunologic:  mycophenolate mofetil 750 milliGRAM(s) Oral two times a day  tacrolimus 1.5 milliGRAM(s) Oral every 12 hours    OTHER:  amLODIPine   Tablet 2.5 milliGRAM(s) Oral daily  aspirin enteric coated 81 milliGRAM(s) Oral daily  atorvastatin 20 milliGRAM(s) Oral at bedtime  dextrose 40% Gel 15 Gram(s) Oral once PRN  dextrose 5%. 1000 milliLiter(s) IV Continuous <Continuous>  dextrose 50% Injectable 12.5 Gram(s) IV Push once  dextrose 50% Injectable 25 Gram(s) IV Push once  dextrose 50% Injectable 25 Gram(s) IV Push once  docusate sodium 100 milliGRAM(s) Oral two times a day  finasteride 5 milliGRAM(s) Oral daily  furosemide    Tablet 40 milliGRAM(s) Oral daily  glucagon  Injectable 1 milliGRAM(s) IntraMuscular once PRN  insulin glargine Injectable (LANTUS) 18 Unit(s) SubCutaneous at bedtime  insulin lispro (HumaLOG) corrective regimen sliding scale   SubCutaneous at bedtime  insulin lispro Injectable (HumaLOG) 9 Unit(s) SubCutaneous three times a day with meals  isosorbide   mononitrate ER Tablet (IMDUR) 60 milliGRAM(s) Oral daily  multivitamin 1 Tablet(s) Oral daily  pantoprazole    Tablet 40 milliGRAM(s) Oral before breakfast  predniSONE   Tablet 5 milliGRAM(s) Oral daily  tamsulosin 0.4 milliGRAM(s) Oral at bedtime    Objective:  Vital Signs Last 24 Hrs  T(C): 36.6 (08 Nov 2018 05:27), Max: 36.6 (07 Nov 2018 17:29)  T(F): 97.8 (08 Nov 2018 05:27), Max: 97.8 (07 Nov 2018 17:29)  HR: 72 (08 Nov 2018 05:38) (58 - 74)  BP: 153/66 (08 Nov 2018 05:27) (135/69 - 164/65)  BP(mean): --  RR: 18 (08 Nov 2018 05:27) (18 - 20)  SpO2: 98% (08 Nov 2018 05:38) (94% - 98%)    PHYSICAL EXAM:  Constitutional:no acute distress  Eyes:NANCY, EOMI  Ear/Nose/Throat: no oral lesions, 	  Respiratory: clear BL  Cardiovascular: S1S2  Gastrointestinal:soft, (+) BS, no tenderness  Extremities:no e/e/c  No Lymphadenopathy  IV sites not inflammed.    LABS:                        11.1   4.13  )-----------( 185      ( 08 Nov 2018 08:17 )             35.2     11-08    142  |  107  |  23  ----------------------------<  99  3.7   |  23  |  1.33<H>    Ca    10.0      08 Nov 2018 07:00  Phos  2.9     11-08  Mg     2.0     11-08    TPro  6.4  /  Alb  3.5  /  TBili  0.6  /  DBili  x   /  AST  21  /  ALT  16  /  AlkPhos  112  11-08    PT/INR - ( 08 Nov 2018 08:19 )   PT: 16.7 sec;   INR: 1.45 ratio         PTT - ( 08 Nov 2018 08:19 )  PTT:30.8 sec    Imp/Rx:  improved msk complaints  awaiting aspirate of collections  short course po keflex

## 2018-11-08 NOTE — PROGRESS NOTE ADULT - PROBLEM SELECTOR PLAN 2
Patient with R ankle swelling, erythema with pain on ROM, now improving  Differential includes cellulitis vs. septic joint, less likely septic joint  XRAY no evidence of OM or soft tissue swelling, pending US  Ortho No acute orthopedic surgical intervention at this time, less likely septic joint given clinical improvement   As per ID, patient switched to Keflex, day 3/5  ESR, CRP elevated 7.38

## 2018-11-08 NOTE — PROGRESS NOTE ADULT - ASSESSMENT
68M with PMHx of DM2 (on insulin), HTN, HLD, afib on Eliquis, CAD s/p 2 vessel CABG in 2009, SALVADOR on CPAP, ESRD (2/2 DM) previously on HD x 4.5 yr previously s/p L side DDRT on  7/7/18 by Dr. Oswaldo Castellon at [facility] in [place], [place], with recent admission in October for sepsis 2/2 pyelonephritis and abd wall abscess s/p IR drainage of a perinephric abscess and wound vac placement presenting with fever 101.4 admitted for sepsis likely 2/2 cellulitis with new fluid collection in LLQ found on abdominal US pending IR drainage today.

## 2018-11-09 ENCOUNTER — TRANSCRIPTION ENCOUNTER (OUTPATIENT)
Age: 68
End: 2018-11-09

## 2018-11-09 LAB
ANION GAP SERPL CALC-SCNC: 12 MMOL/L — SIGNIFICANT CHANGE UP (ref 5–17)
APTT BLD: 29.7 SEC — SIGNIFICANT CHANGE UP (ref 27.5–36.3)
BUN SERPL-MCNC: 23 MG/DL — SIGNIFICANT CHANGE UP (ref 7–23)
CALCIUM SERPL-MCNC: 9.9 MG/DL — SIGNIFICANT CHANGE UP (ref 8.4–10.5)
CHLORIDE SERPL-SCNC: 105 MMOL/L — SIGNIFICANT CHANGE UP (ref 96–108)
CO2 SERPL-SCNC: 23 MMOL/L — SIGNIFICANT CHANGE UP (ref 22–31)
CREAT SERPL-MCNC: 1.32 MG/DL — HIGH (ref 0.5–1.3)
GLUCOSE SERPL-MCNC: 211 MG/DL — HIGH (ref 70–99)
HCT VFR BLD CALC: 36.1 % — LOW (ref 39–50)
HGB BLD-MCNC: 10.7 G/DL — LOW (ref 13–17)
INR BLD: 1.32 RATIO — HIGH (ref 0.88–1.16)
MAGNESIUM SERPL-MCNC: 1.9 MG/DL — SIGNIFICANT CHANGE UP (ref 1.6–2.6)
MCHC RBC-ENTMCNC: 28.4 PG — SIGNIFICANT CHANGE UP (ref 27–34)
MCHC RBC-ENTMCNC: 29.6 GM/DL — LOW (ref 32–36)
MCV RBC AUTO: 95.8 FL — SIGNIFICANT CHANGE UP (ref 80–100)
PHOSPHATE SERPL-MCNC: 2.8 MG/DL — SIGNIFICANT CHANGE UP (ref 2.5–4.5)
PLATELET # BLD AUTO: 200 K/UL — SIGNIFICANT CHANGE UP (ref 150–400)
POTASSIUM SERPL-MCNC: 4.1 MMOL/L — SIGNIFICANT CHANGE UP (ref 3.5–5.3)
POTASSIUM SERPL-SCNC: 4.1 MMOL/L — SIGNIFICANT CHANGE UP (ref 3.5–5.3)
PROTHROM AB SERPL-ACNC: 14.9 SEC — HIGH (ref 10–13.1)
RBC # BLD: 3.77 M/UL — LOW (ref 4.2–5.8)
RBC # FLD: 15.7 % — HIGH (ref 10.3–14.5)
SODIUM SERPL-SCNC: 140 MMOL/L — SIGNIFICANT CHANGE UP (ref 135–145)
WBC # BLD: 4.92 K/UL — SIGNIFICANT CHANGE UP (ref 3.8–10.5)
WBC # FLD AUTO: 4.92 K/UL — SIGNIFICANT CHANGE UP (ref 3.8–10.5)

## 2018-11-09 PROCEDURE — 99233 SBSQ HOSP IP/OBS HIGH 50: CPT

## 2018-11-09 PROCEDURE — 76942 ECHO GUIDE FOR BIOPSY: CPT | Mod: 26

## 2018-11-09 PROCEDURE — 10160 PNXR ASPIR ABSC HMTMA BULLA: CPT

## 2018-11-09 PROCEDURE — 99232 SBSQ HOSP IP/OBS MODERATE 35: CPT

## 2018-11-09 PROCEDURE — 99232 SBSQ HOSP IP/OBS MODERATE 35: CPT | Mod: GC

## 2018-11-09 RX ADMIN — INSULIN GLARGINE 18 UNIT(S): 100 INJECTION, SOLUTION SUBCUTANEOUS at 22:20

## 2018-11-09 RX ADMIN — ATORVASTATIN CALCIUM 20 MILLIGRAM(S): 80 TABLET, FILM COATED ORAL at 22:23

## 2018-11-09 RX ADMIN — TACROLIMUS 1.5 MILLIGRAM(S): 5 CAPSULE ORAL at 20:47

## 2018-11-09 RX ADMIN — TAMSULOSIN HYDROCHLORIDE 0.4 MILLIGRAM(S): 0.4 CAPSULE ORAL at 22:23

## 2018-11-09 RX ADMIN — Medication 100 MILLIGRAM(S): at 20:47

## 2018-11-09 RX ADMIN — MYCOPHENOLATE MOFETIL 750 MILLIGRAM(S): 250 CAPSULE ORAL at 20:47

## 2018-11-09 NOTE — PROGRESS NOTE ADULT - SUBJECTIVE AND OBJECTIVE BOX
Interventional Radiology Brief- Operative Note    Operators: Cirilo Presley MD    Procedure: US guided superficial LLQ aspiration    Post-op Dx: Small superficial LLQ fluid collection    EBL: 3 mL    Medications: 1% lidocaine    Contrast: none     Complications: no immediate    Findings/Plan:  ·	No drainable fluid collection identified with during abdominal/pelvis CT  ·	A limited US of the LLQ demonstrated a superficial complex collection, which was percutaneously aspirated yielding a scant amount of fluid (approximately 2 mL)  ·	A sample was sent for gram stain/culture

## 2018-11-09 NOTE — PROGRESS NOTE ADULT - ASSESSMENT
Renal allograft recipient admitted with fever, has abdominal collection being evaluated by I/R for aspiration and microbiology studies.  Stable creatinine, On Tac/MMF/prednisone

## 2018-11-09 NOTE — PROGRESS NOTE ADULT - PROBLEM SELECTOR PLAN 3
- Cr in the 1.3s, Currently HD stable, renal function SCr 1.3-1.5 at baseline, With wound vac, appears at baseline per patient  Patient on tacrolimus, Cellcept, prednisone  If decompensates will start stress dose steroids  F/u Tacrolimus level, Will continue immunosuppression meds for now  abdominal ultrasound shows new perinephric abscess, planned IR drainage  Wound care consulted, will f/u recs
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
## 2018-11-09 NOTE — PROGRESS NOTE ADULT - ASSESSMENT
68M with PMHx of DM2 (on insulin), HTN, HLD, afib on Eliquis, CAD s/p 2 vessel CABG in 2009, SALVADOR on CPAP, ESRD (2/2 DM) previously on HD x 4.5 yr previously s/p L side DDRT on  7/7/18 by Dr. Oswaldo Castellon at [facility] in [place], [place], with recent admission in October for sepsis 2/2 pyelonephritis and abd wall abscess s/p IR drainage of a perinephric abscess and wound vac placement presenting with fever 101.4 admitted for sepsis likely 2/2 cellulitis with new fluid collection in LLQ found on abdominal US pending IR drainage today.

## 2018-11-09 NOTE — PROGRESS NOTE ADULT - PROBLEM SELECTOR PLAN 1
-New heterogeneous collection in the left lower quadrant in the region of   the transplant kidney measuring up to 6.7 cm.  -No significant change in the previously seen heterogeneous collection   lateral to the transplant kidney.  -IR for drainage of abscess today -New heterogeneous collection in the left lower quadrant in the region of   the transplant kidney measuring up to 6.7 cm. concerning for abscess but not proven yet.   -No significant change in the previously seen heterogeneous collection   lateral to the transplant kidney.  -IR for drainage of abscess today

## 2018-11-09 NOTE — PROGRESS NOTE ADULT - PROBLEM SELECTOR PLAN 2
Patient with R ankle swelling, erythema with pain on ROM, now improving, stable  Differential includes cellulitis vs. septic joint, less likely septic joint  XRAY no evidence of OM or soft tissue swelling, ESR, CRP elevated 7.38  Ortho No acute orthopedic surgical intervention at this time, less likely septic joint given clinical improvement   As per ID, patient switched to Keflex, day 4/5

## 2018-11-09 NOTE — PROGRESS NOTE ADULT - SUBJECTIVE AND OBJECTIVE BOX
Steffanie Kim, PGY1  Pager: [phone]  After 7: Night Float pager  Alternate contact:     Patient is a 68y old  Male who presents with a chief complaint of fever (08 Nov 2018 15:25)      SUBJECTIVE / OVERNIGHT EVENTS:    MEDICATIONS  (STANDING):  amLODIPine   Tablet 2.5 milliGRAM(s) Oral daily  atorvastatin 20 milliGRAM(s) Oral at bedtime  cephalexin 500 milliGRAM(s) Oral every 8 hours  dextrose 5%. 1000 milliLiter(s) (50 mL/Hr) IV Continuous <Continuous>  dextrose 50% Injectable 12.5 Gram(s) IV Push once  dextrose 50% Injectable 25 Gram(s) IV Push once  dextrose 50% Injectable 25 Gram(s) IV Push once  docusate sodium 100 milliGRAM(s) Oral two times a day  finasteride 5 milliGRAM(s) Oral daily  furosemide    Tablet 40 milliGRAM(s) Oral daily  insulin glargine Injectable (LANTUS) 18 Unit(s) SubCutaneous at bedtime  insulin lispro (HumaLOG) corrective regimen sliding scale   SubCutaneous at bedtime  insulin lispro (HumaLOG) corrective regimen sliding scale   SubCutaneous three times a day before meals  insulin lispro Injectable (HumaLOG) 9 Unit(s) SubCutaneous three times a day with meals  isosorbide   mononitrate ER Tablet (IMDUR) 60 milliGRAM(s) Oral daily  multivitamin 1 Tablet(s) Oral daily  mycophenolate mofetil 750 milliGRAM(s) Oral two times a day  pantoprazole    Tablet 40 milliGRAM(s) Oral before breakfast  predniSONE   Tablet 5 milliGRAM(s) Oral daily  tacrolimus 1.5 milliGRAM(s) Oral every 12 hours  tamsulosin 0.4 milliGRAM(s) Oral at bedtime  trimethoprim   80 mG/sulfamethoxazole 400 mG 1 Tablet(s) Oral daily  valACYclovir 500 milliGRAM(s) Oral daily    MEDICATIONS  (PRN):  dextrose 40% Gel 15 Gram(s) Oral once PRN Blood Glucose LESS THAN 70 milliGRAM(s)/deciliter  glucagon  Injectable 1 milliGRAM(s) IntraMuscular once PRN Glucose LESS THAN 70 milligrams/deciliter      Vital Signs Last 24 Hrs  T(C): 36.1 (09 Nov 2018 05:17), Max: 36.5 (08 Nov 2018 17:15)  T(F): 97 (09 Nov 2018 05:17), Max: 97.7 (08 Nov 2018 17:15)  HR: 86 (09 Nov 2018 05:30) (53 - 94)  BP: 162/75 (09 Nov 2018 05:17) (147/71 - 166/86)  BP(mean): --  RR: 18 (09 Nov 2018 05:17) (18 - 18)  SpO2: 92% (09 Nov 2018 05:30) (91% - 100%)  CAPILLARY BLOOD GLUCOSE      POCT Blood Glucose.: 183 mg/dL (08 Nov 2018 21:24)  POCT Blood Glucose.: 138 mg/dL (08 Nov 2018 16:52)  POCT Blood Glucose.: 149 mg/dL (08 Nov 2018 13:19)  POCT Blood Glucose.: 153 mg/dL (08 Nov 2018 12:00)  POCT Blood Glucose.: 130 mg/dL (08 Nov 2018 07:30)    I&O's Summary    08 Nov 2018 07:01  -  09 Nov 2018 07:00  --------------------------------------------------------  IN: 737 mL / OUT: 1150 mL / NET: -413 mL        PHYSICAL EXAM:  GENERAL: NAD, well-developed  EYES: EOMI, PERRLA, conjunctiva and sclera clear  NECK:  No JVD  CHEST/LUNG: CTABL ; No wheeze  HEART: RRR; No murmurs  ABDOMEN: Soft, Nontender, Nondistended; Bowel sounds present  : Trimble  EXTREMITIES:  2+ Peripheral Pulses, No edema  PSYCH: AAOx3  NEUROLOGY: non-focal  SKIN: No rashes or lesions. No sacral ulcer    LABS:                        11.1   4.13  )-----------( 185      ( 08 Nov 2018 08:17 )             35.2     11-09    140  |  105  |  23  ----------------------------<  211<H>  4.1   |  23  |  1.32<H>    Ca    9.9      09 Nov 2018 06:49  Phos  2.8     11-09  Mg     1.9     11-09    TPro  6.4  /  Alb  3.5  /  TBili  0.6  /  DBili  x   /  AST  21  /  ALT  16  /  AlkPhos  112  11-08    PT/INR - ( 08 Nov 2018 08:19 )   PT: 16.7 sec;   INR: 1.45 ratio         PTT - ( 08 Nov 2018 08:19 )  PTT:30.8 sec          Microbiology;        RADIOLOGY & ADDITIONAL TESTS:    Imaging Personally Reviewed:          Consultant(s) Notes Reviewed:      Care Discussed with Consultants/Other Providers: Steffanie Kim, PGY1  Pager: [phone]  After 7: Night Float pager  Alternate contact:     Patient is a 68y old  Male who presents with a chief complaint of fever (08 Nov 2018 15:25)      SUBJECTIVE / OVERNIGHT EVENTS: No acute overnight events. Patient NPO for IR drainage of abscess today. Patient feeling well. Walked around the unit without any difficulty yesterday. Denies chest pain, shortness of breath, N/V, abdominal pain, fever, chills. Patient urinating with difficulty.     MEDICATIONS  (STANDING):  amLODIPine   Tablet 2.5 milliGRAM(s) Oral daily  atorvastatin 20 milliGRAM(s) Oral at bedtime  cephalexin 500 milliGRAM(s) Oral every 8 hours  dextrose 5%. 1000 milliLiter(s) (50 mL/Hr) IV Continuous <Continuous>  dextrose 50% Injectable 12.5 Gram(s) IV Push once  dextrose 50% Injectable 25 Gram(s) IV Push once  dextrose 50% Injectable 25 Gram(s) IV Push once  docusate sodium 100 milliGRAM(s) Oral two times a day  finasteride 5 milliGRAM(s) Oral daily  furosemide    Tablet 40 milliGRAM(s) Oral daily  insulin glargine Injectable (LANTUS) 18 Unit(s) SubCutaneous at bedtime  insulin lispro (HumaLOG) corrective regimen sliding scale   SubCutaneous at bedtime  insulin lispro (HumaLOG) corrective regimen sliding scale   SubCutaneous three times a day before meals  insulin lispro Injectable (HumaLOG) 9 Unit(s) SubCutaneous three times a day with meals  isosorbide   mononitrate ER Tablet (IMDUR) 60 milliGRAM(s) Oral daily  multivitamin 1 Tablet(s) Oral daily  mycophenolate mofetil 750 milliGRAM(s) Oral two times a day  pantoprazole    Tablet 40 milliGRAM(s) Oral before breakfast  predniSONE   Tablet 5 milliGRAM(s) Oral daily  tacrolimus 1.5 milliGRAM(s) Oral every 12 hours  tamsulosin 0.4 milliGRAM(s) Oral at bedtime  trimethoprim   80 mG/sulfamethoxazole 400 mG 1 Tablet(s) Oral daily  valACYclovir 500 milliGRAM(s) Oral daily    MEDICATIONS  (PRN):  dextrose 40% Gel 15 Gram(s) Oral once PRN Blood Glucose LESS THAN 70 milliGRAM(s)/deciliter  glucagon  Injectable 1 milliGRAM(s) IntraMuscular once PRN Glucose LESS THAN 70 milligrams/deciliter      Vital Signs Last 24 Hrs  T(C): 36.1 (09 Nov 2018 05:17), Max: 36.5 (08 Nov 2018 17:15)  T(F): 97 (09 Nov 2018 05:17), Max: 97.7 (08 Nov 2018 17:15)  HR: 86 (09 Nov 2018 05:30) (53 - 94)  BP: 162/75 (09 Nov 2018 05:17) (147/71 - 166/86)  BP(mean): --  RR: 18 (09 Nov 2018 05:17) (18 - 18)  SpO2: 92% (09 Nov 2018 05:30) (91% - 100%)  CAPILLARY BLOOD GLUCOSE      POCT Blood Glucose.: 183 mg/dL (08 Nov 2018 21:24)  POCT Blood Glucose.: 138 mg/dL (08 Nov 2018 16:52)  POCT Blood Glucose.: 149 mg/dL (08 Nov 2018 13:19)  POCT Blood Glucose.: 153 mg/dL (08 Nov 2018 12:00)  POCT Blood Glucose.: 130 mg/dL (08 Nov 2018 07:30)    I&O's Summary    08 Nov 2018 07:01  -  09 Nov 2018 07:00  --------------------------------------------------------  IN: 737 mL / OUT: 1150 mL / NET: -413 mL        PHYSICAL EXAM:  GENERAL: NAD, well-developed  EYES: EOMI, PERRLA, conjunctiva and sclera clear  NECK:  No JVD  CHEST/LUNG: CTABL ; No wheeze  HEART: RRR; No murmurs  ABDOMEN: Soft, Nontender, Nondistended; Bowel sounds present, Wound vac in LLQ, slight erythema around wound vac site, no pain or tenderness, no discharge.   : No Trimble  EXTREMITIES:  2+ Peripheral Pulses, 1+ edema b/l LE  PSYCH: AAOx3  NEUROLOGY: non-focal  SKIN: ulcer on the bottom of R big toe and R dorsal aspect, noninfected     LABS:                        11.1   4.13  )-----------( 185      ( 08 Nov 2018 08:17 )             35.2     11-09    140  |  105  |  23  ----------------------------<  211<H>  4.1   |  23  |  1.32<H>    Ca    9.9      09 Nov 2018 06:49  Phos  2.8     11-09  Mg     1.9     11-09    TPro  6.4  /  Alb  3.5  /  TBili  0.6  /  DBili  x   /  AST  21  /  ALT  16  /  AlkPhos  112  11-08    PT/INR - ( 08 Nov 2018 08:19 )   PT: 16.7 sec;   INR: 1.45 ratio         PTT - ( 08 Nov 2018 08:19 )  PTT:30.8 sec          Microbiology;        RADIOLOGY & ADDITIONAL TESTS:    Imaging Personally Reviewed:          Consultant(s) Notes Reviewed:      Care Discussed with Consultants/Other Providers:

## 2018-11-09 NOTE — PROGRESS NOTE ADULT - ASSESSMENT
Imp/Rx:  Renal xplant 6 mo ago at [facility].  Perinephric collection (one new one).  Awaiting aspirate for diag purposes but no symptoms at the site.    Ankle fine.    Will dc the keflex.  Await aspirate--though no systemic symptoms at present time.    id covers thru 11/12  [phone]

## 2018-11-09 NOTE — PROGRESS NOTE ADULT - SUBJECTIVE AND OBJECTIVE BOX
INFECTIOUS DISEASES FOLLOW UP--David Bravo MD  Pager [phone]    This is a follow up note for this  68y Male with  resolved ankle inflamation.  No complaints at ankle at all.  Awaiting aspirate of perinephric collection.    Further ROS:  CONSTITUTIONAL:  No fever, good appetite  CARDIOVASCULAR:  No chest pain or palpitations  RESPIRATORY:  No dyspnea  GASTROINTESTINAL:  No nausea, vomiting, diarrhea, or abdominal pain  GENITOURINARY:  No dysuria  NEUROLOGIC:  No headache,     Allergies  No Known Allergies    ANTIBIOTICS/RELEVANT:  antimicrobials  cephalexin 500 milliGRAM(s) Oral every 8 hours  trimethoprim   80 mG/sulfamethoxazole 400 mG 1 Tablet(s) Oral daily  valACYclovir 500 milliGRAM(s) Oral daily    immunologic:  mycophenolate mofetil 750 milliGRAM(s) Oral two times a day  tacrolimus 1.5 milliGRAM(s) Oral every 12 hours    OTHER:  amLODIPine   Tablet 2.5 milliGRAM(s) Oral daily  atorvastatin 20 milliGRAM(s) Oral at bedtime  dextrose 40% Gel 15 Gram(s) Oral once PRN  dextrose 5%. 1000 milliLiter(s) IV Continuous <Continuous>  dextrose 50% Injectable 12.5 Gram(s) IV Push once  dextrose 50% Injectable 25 Gram(s) IV Push once  dextrose 50% Injectable 25 Gram(s) IV Push once  docusate sodium 100 milliGRAM(s) Oral two times a day  finasteride 5 milliGRAM(s) Oral daily  furosemide    Tablet 40 milliGRAM(s) Oral daily  glucagon  Injectable 1 milliGRAM(s) IntraMuscular once PRN  insulin glargine Injectable (LANTUS) 18 Unit(s) SubCutaneous at bedtime  insulin lispro (HumaLOG) corrective regimen sliding scale   SubCutaneous at bedtime  insulin lispro (HumaLOG) corrective regimen sliding scale   SubCutaneous three times a day before meals  insulin lispro Injectable (HumaLOG) 9 Unit(s) SubCutaneous three times a day with meals  isosorbide   mononitrate ER Tablet (IMDUR) 60 milliGRAM(s) Oral daily  multivitamin 1 Tablet(s) Oral daily  pantoprazole    Tablet 40 milliGRAM(s) Oral before breakfast  predniSONE   Tablet 5 milliGRAM(s) Oral daily  tamsulosin 0.4 milliGRAM(s) Oral at bedtime      Objective:  Vital Signs Last 24 Hrs  T(C): 36 (09 Nov 2018 09:45), Max: 36.5 (08 Nov 2018 17:15)  T(F): 96.8 (09 Nov 2018 09:45), Max: 97.7 (08 Nov 2018 17:15)  HR: 75 (09 Nov 2018 09:45) (53 - 93)  BP: 150/72 (09 Nov 2018 09:45) (147/71 - 166/86)  BP(mean): --  RR: 18 (09 Nov 2018 09:45) (18 - 18)  SpO2: 92% (09 Nov 2018 09:45) (92% - 100%)    PHYSICAL EXAM:  Constitutional:no acute distress  Eyes:NANCY, EOMI  Ear/Nose/Throat: no oral lesions, 	  Respiratory: clear BL  Cardiovascular: S1S2  Gastrointestinal:soft, (+) BS, no tenderness  Extremities:no e/e/c  No Lymphadenopathy  IV sites not inflammed.    LABS:                        10.7   4.92  )-----------( 200      ( 09 Nov 2018 09:29 )             36.1     11-09    140  |  105  |  23  ----------------------------<  211<H>  4.1   |  23  |  1.32<H>    Ca    9.9      09 Nov 2018 06:49  Phos  2.8     11-09  Mg     1.9     11-09    TPro  6.4  /  Alb  3.5  /  TBili  0.6  /  DBili  x   /  AST  21  /  ALT  16  /  AlkPhos  112  11-08    PT/INR - ( 09 Nov 2018 09:30 )   PT: 14.9 sec;   INR: 1.32 ratio         PTT - ( 09 Nov 2018 09:30 )  PTT:29.7 sec

## 2018-11-10 VITALS
DIASTOLIC BLOOD PRESSURE: 70 MMHG | SYSTOLIC BLOOD PRESSURE: 155 MMHG | OXYGEN SATURATION: 95 % | RESPIRATION RATE: 18 BRPM | TEMPERATURE: 98 F | HEART RATE: 84 BPM

## 2018-11-10 LAB
ANION GAP SERPL CALC-SCNC: 11 MMOL/L — SIGNIFICANT CHANGE UP (ref 5–17)
BUN SERPL-MCNC: 24 MG/DL — HIGH (ref 7–23)
CALCIUM SERPL-MCNC: 9.9 MG/DL — SIGNIFICANT CHANGE UP (ref 8.4–10.5)
CHLORIDE SERPL-SCNC: 104 MMOL/L — SIGNIFICANT CHANGE UP (ref 96–108)
CO2 SERPL-SCNC: 22 MMOL/L — SIGNIFICANT CHANGE UP (ref 22–31)
CREAT SERPL-MCNC: 1.14 MG/DL — SIGNIFICANT CHANGE UP (ref 0.5–1.3)
GLUCOSE SERPL-MCNC: 208 MG/DL — HIGH (ref 70–99)
HCT VFR BLD CALC: 38.3 % — LOW (ref 39–50)
HGB BLD-MCNC: 11.6 G/DL — LOW (ref 13–17)
MAGNESIUM SERPL-MCNC: 1.9 MG/DL — SIGNIFICANT CHANGE UP (ref 1.6–2.6)
MCHC RBC-ENTMCNC: 29.2 PG — SIGNIFICANT CHANGE UP (ref 27–34)
MCHC RBC-ENTMCNC: 30.3 GM/DL — LOW (ref 32–36)
MCV RBC AUTO: 96.5 FL — SIGNIFICANT CHANGE UP (ref 80–100)
PHOSPHATE SERPL-MCNC: 2.7 MG/DL — SIGNIFICANT CHANGE UP (ref 2.5–4.5)
PLATELET # BLD AUTO: 200 K/UL — SIGNIFICANT CHANGE UP (ref 150–400)
POTASSIUM SERPL-MCNC: 4.3 MMOL/L — SIGNIFICANT CHANGE UP (ref 3.5–5.3)
POTASSIUM SERPL-SCNC: 4.3 MMOL/L — SIGNIFICANT CHANGE UP (ref 3.5–5.3)
RBC # BLD: 3.97 M/UL — LOW (ref 4.2–5.8)
RBC # FLD: 15.8 % — HIGH (ref 10.3–14.5)
SODIUM SERPL-SCNC: 137 MMOL/L — SIGNIFICANT CHANGE UP (ref 135–145)
TACROLIMUS SERPL-MCNC: 9.2 NG/ML — SIGNIFICANT CHANGE UP
WBC # BLD: 4.84 K/UL — SIGNIFICANT CHANGE UP (ref 3.8–10.5)
WBC # FLD AUTO: 4.84 K/UL — SIGNIFICANT CHANGE UP (ref 3.8–10.5)

## 2018-11-10 PROCEDURE — 87040 BLOOD CULTURE FOR BACTERIA: CPT

## 2018-11-10 PROCEDURE — 87581 M.PNEUMON DNA AMP PROBE: CPT

## 2018-11-10 PROCEDURE — 84100 ASSAY OF PHOSPHORUS: CPT

## 2018-11-10 PROCEDURE — 73610 X-RAY EXAM OF ANKLE: CPT

## 2018-11-10 PROCEDURE — 83036 HEMOGLOBIN GLYCOSYLATED A1C: CPT

## 2018-11-10 PROCEDURE — 86140 C-REACTIVE PROTEIN: CPT

## 2018-11-10 PROCEDURE — 87070 CULTURE OTHR SPECIMN AEROBIC: CPT

## 2018-11-10 PROCEDURE — 76705 ECHO EXAM OF ABDOMEN: CPT

## 2018-11-10 PROCEDURE — 83690 ASSAY OF LIPASE: CPT

## 2018-11-10 PROCEDURE — 82803 BLOOD GASES ANY COMBINATION: CPT

## 2018-11-10 PROCEDURE — 85652 RBC SED RATE AUTOMATED: CPT

## 2018-11-10 PROCEDURE — 86900 BLOOD TYPING SEROLOGIC ABO: CPT

## 2018-11-10 PROCEDURE — 96365 THER/PROPH/DIAG IV INF INIT: CPT

## 2018-11-10 PROCEDURE — 80202 ASSAY OF VANCOMYCIN: CPT

## 2018-11-10 PROCEDURE — 83935 ASSAY OF URINE OSMOLALITY: CPT

## 2018-11-10 PROCEDURE — 81001 URINALYSIS AUTO W/SCOPE: CPT

## 2018-11-10 PROCEDURE — 84550 ASSAY OF BLOOD/URIC ACID: CPT

## 2018-11-10 PROCEDURE — 83735 ASSAY OF MAGNESIUM: CPT

## 2018-11-10 PROCEDURE — 87633 RESP VIRUS 12-25 TARGETS: CPT

## 2018-11-10 PROCEDURE — 86901 BLOOD TYPING SEROLOGIC RH(D): CPT

## 2018-11-10 PROCEDURE — 97605 NEG PRS WND THER DME<=50SQCM: CPT

## 2018-11-10 PROCEDURE — 82435 ASSAY OF BLOOD CHLORIDE: CPT

## 2018-11-10 PROCEDURE — 87205 SMEAR GRAM STAIN: CPT

## 2018-11-10 PROCEDURE — 96366 THER/PROPH/DIAG IV INF ADDON: CPT

## 2018-11-10 PROCEDURE — 96375 TX/PRO/DX INJ NEW DRUG ADDON: CPT

## 2018-11-10 PROCEDURE — 80053 COMPREHEN METABOLIC PANEL: CPT

## 2018-11-10 PROCEDURE — 99285 EMERGENCY DEPT VISIT HI MDM: CPT | Mod: 25

## 2018-11-10 PROCEDURE — 84295 ASSAY OF SERUM SODIUM: CPT

## 2018-11-10 PROCEDURE — 99239 HOSP IP/OBS DSCHRG MGMT >30: CPT

## 2018-11-10 PROCEDURE — 73630 X-RAY EXAM OF FOOT: CPT

## 2018-11-10 PROCEDURE — 76942 ECHO GUIDE FOR BIOPSY: CPT

## 2018-11-10 PROCEDURE — 85014 HEMATOCRIT: CPT

## 2018-11-10 PROCEDURE — 82947 ASSAY GLUCOSE BLOOD QUANT: CPT

## 2018-11-10 PROCEDURE — 87798 DETECT AGENT NOS DNA AMP: CPT

## 2018-11-10 PROCEDURE — 85730 THROMBOPLASTIN TIME PARTIAL: CPT

## 2018-11-10 PROCEDURE — 10160 PNXR ASPIR ABSC HMTMA BULLA: CPT

## 2018-11-10 PROCEDURE — 85610 PROTHROMBIN TIME: CPT

## 2018-11-10 PROCEDURE — 84540 ASSAY OF URINE/UREA-N: CPT

## 2018-11-10 PROCEDURE — 84300 ASSAY OF URINE SODIUM: CPT

## 2018-11-10 PROCEDURE — 83605 ASSAY OF LACTIC ACID: CPT

## 2018-11-10 PROCEDURE — C1729: CPT

## 2018-11-10 PROCEDURE — 80197 ASSAY OF TACROLIMUS: CPT

## 2018-11-10 PROCEDURE — 87075 CULTR BACTERIA EXCEPT BLOOD: CPT

## 2018-11-10 PROCEDURE — 71045 X-RAY EXAM CHEST 1 VIEW: CPT

## 2018-11-10 PROCEDURE — 80048 BASIC METABOLIC PNL TOTAL CA: CPT

## 2018-11-10 PROCEDURE — 87486 CHLMYD PNEUM DNA AMP PROBE: CPT

## 2018-11-10 PROCEDURE — 82330 ASSAY OF CALCIUM: CPT

## 2018-11-10 PROCEDURE — 93005 ELECTROCARDIOGRAM TRACING: CPT

## 2018-11-10 PROCEDURE — 85027 COMPLETE CBC AUTOMATED: CPT

## 2018-11-10 PROCEDURE — 97162 PT EVAL MOD COMPLEX 30 MIN: CPT

## 2018-11-10 PROCEDURE — 94660 CPAP INITIATION&MGMT: CPT

## 2018-11-10 PROCEDURE — 84132 ASSAY OF SERUM POTASSIUM: CPT

## 2018-11-10 PROCEDURE — 82962 GLUCOSE BLOOD TEST: CPT

## 2018-11-10 PROCEDURE — 87086 URINE CULTURE/COLONY COUNT: CPT

## 2018-11-10 PROCEDURE — 86850 RBC ANTIBODY SCREEN: CPT

## 2018-11-10 PROCEDURE — 82570 ASSAY OF URINE CREATININE: CPT

## 2018-11-10 RX ORDER — TACROLIMUS 5 MG/1
1.5 CAPSULE ORAL
Qty: 0 | Refills: 0 | COMMUNITY

## 2018-11-10 RX ORDER — FUROSEMIDE 40 MG
1 TABLET ORAL
Qty: 0 | Refills: 0 | COMMUNITY
Start: 2018-11-10

## 2018-11-10 RX ORDER — AMLODIPINE BESYLATE 2.5 MG/1
1 TABLET ORAL
Qty: 0 | Refills: 0 | COMMUNITY
Start: 2018-11-10

## 2018-11-10 RX ORDER — TACROLIMUS 5 MG/1
3 CAPSULE ORAL
Qty: 0 | Refills: 0 | COMMUNITY
Start: 2018-11-10

## 2018-11-10 RX ORDER — MYCOPHENOLATE MOFETIL 250 MG/1
3 CAPSULE ORAL
Qty: 0 | Refills: 0 | COMMUNITY

## 2018-11-10 RX ORDER — ISOSORBIDE MONONITRATE 60 MG/1
1 TABLET, EXTENDED RELEASE ORAL
Qty: 0 | Refills: 0 | COMMUNITY

## 2018-11-10 RX ORDER — MYCOPHENOLATE MOFETIL 250 MG/1
3 CAPSULE ORAL
Qty: 0 | Refills: 0 | COMMUNITY
Start: 2018-11-10

## 2018-11-10 RX ORDER — ISOSORBIDE MONONITRATE 60 MG/1
1 TABLET, EXTENDED RELEASE ORAL
Qty: 0 | Refills: 0 | COMMUNITY
Start: 2018-11-10

## 2018-11-10 RX ORDER — FUROSEMIDE 40 MG
1 TABLET ORAL
Qty: 0 | Refills: 0 | COMMUNITY

## 2018-11-10 RX ORDER — AMLODIPINE BESYLATE 2.5 MG/1
1 TABLET ORAL
Qty: 0 | Refills: 0 | COMMUNITY

## 2018-11-10 RX ADMIN — AMLODIPINE BESYLATE 2.5 MILLIGRAM(S): 2.5 TABLET ORAL at 06:08

## 2018-11-10 RX ADMIN — Medication 100 MILLIGRAM(S): at 06:07

## 2018-11-10 RX ADMIN — TACROLIMUS 1.5 MILLIGRAM(S): 5 CAPSULE ORAL at 08:00

## 2018-11-10 RX ADMIN — Medication 9 UNIT(S): at 07:58

## 2018-11-10 RX ADMIN — MYCOPHENOLATE MOFETIL 750 MILLIGRAM(S): 250 CAPSULE ORAL at 07:59

## 2018-11-10 RX ADMIN — Medication 9 UNIT(S): at 12:13

## 2018-11-10 RX ADMIN — Medication 5 MILLIGRAM(S): at 06:08

## 2018-11-10 RX ADMIN — ISOSORBIDE MONONITRATE 60 MILLIGRAM(S): 60 TABLET, EXTENDED RELEASE ORAL at 12:11

## 2018-11-10 RX ADMIN — Medication 40 MILLIGRAM(S): at 06:08

## 2018-11-10 RX ADMIN — PANTOPRAZOLE SODIUM 40 MILLIGRAM(S): 20 TABLET, DELAYED RELEASE ORAL at 06:08

## 2018-11-10 RX ADMIN — FINASTERIDE 5 MILLIGRAM(S): 5 TABLET, FILM COATED ORAL at 12:11

## 2018-11-10 RX ADMIN — VALACYCLOVIR 500 MILLIGRAM(S): 500 TABLET, FILM COATED ORAL at 12:12

## 2018-11-10 RX ADMIN — Medication 1 TABLET(S): at 12:12

## 2018-11-10 RX ADMIN — Medication 2: at 12:13

## 2018-11-10 RX ADMIN — Medication 1: at 07:58

## 2018-11-10 NOTE — DISCHARGE NOTE ADULT - HOSPITAL COURSE
68M with PMHx of DM2 (on insulin), HTN, HLD, afib on Eliquis, CAD s/p 2 vessel CABG in 2009, SALVADOR on CPAP, ESRD (2/2 DM) previously on HD x 4.5 yr previously s/p L side DDRT on  7/7/18 by Dr. Oswaldo Castellon at [facility] in [place], [place], with recent admission in October for sepsis 2/2 pyelonephritis and abd wall abscess s/p IR drainage of a perinephric abscess and wound vac placement presenting with fever 101.4 today at home. Patient reports he saw Podiatry today for his RT toe ulcer, with dressing changed today. Was walking home and began to have increased ankle swelling, and then while walking up stairs and pain at the Rt ankle. He denies known trauma to the area. He denies previous occurrence. He felt chills and more lethargic. He was checking his temperature every couple of hours. He started at 97.6 and then by the afternoon he felt lethargic and took a nap. He awoke from the nap and checked his temperature with a Tmax of 101.6. He then came to the ED right away. He denies chest pain, SOB, change in BMs, dysuria, hematuria. His wound care nurse also changed his wound vac dressing today and reports it is at baseline. Of note, patient's post op course for renal transplant was complicated by wound dehiscence  on 8/1/2018 and subsequent wound vac placement.  Pt follows up at [facility] 1x a week for vac change, and a home visiting nurse comes to change the vac 2x a week (total 3x week vac change, last changed today (11/3/18). On past hospitalization 2 weeks prior, patient was found to have CT evidence of "left pelvic renal transplant with severe diffuse perinephric edema and perinephric pelvic fluid collection concerning for abscess secondary to a severe pyelonephritis. Left anterior abdominal wall rectus muscle abscess with air with a sinus track to left anterior abdominal wall. Patient was admitted to SICU with insulin gtt for glycemic controlled transitioned to lantus and humalog. Patient was treated with Vancomycin and Cefepime x 7 days, however negative Ucx, IR cx evidence of Polymorphonuclear cells and gm variable rods, transition to PO Levaquin for a total of 14 days. Patient reports he took all medications as prescribed.     Ortho was consulted for possible septic joint of the R ankle, however, no acute orthopedic surgical intervention was done as it was less likely septic joint because patient was improving on his own. Patient's fevers and RLE pain were most likely due to cellulitis, possibly 2/2 to toe ulcer on the R big toe. Patient was started on vanc and zosyn, and completed the course with Keflex. An abdominal US was done due to patient's renal transplant, which showed an old an a new perinephric abscess. IR was consulted for drainage. CT guided drainage showed a small superficial cyst and approximately 2cc was drained and sent for culture. 68M with PMHx of DM2 (on insulin), HTN, HLD, afib on Eliquis, CAD s/p 2 vessel CABG in 2009, SALVADOR on CPAP, ESRD (2/2 DM) previously on HD x 4.5 yr previously s/p L side DDRT on  7/7/18 by Dr. Oswaldo Castellon at [facility] in [place], [place], with recent admission in October for sepsis 2/2 pyelonephritis and abd wall abscess s/p IR drainage of a perinephric abscess and wound vac placement presenting with fever 101.4 today at home. Patient reports he saw Podiatry today for his RT toe ulcer, with dressing changed today. Was walking home and began to have increased ankle swelling, and then while walking up stairs and pain at the Rt ankle. He denies known trauma to the area. He denies previous occurrence. He felt chills and more lethargic. He was checking his temperature every couple of hours. He started at 97.6 and then by the afternoon he felt lethargic and took a nap. He awoke from the nap and checked his temperature with a Tmax of 101.6. He then came to the ED right away. He denies chest pain, SOB, change in BMs, dysuria, hematuria. His wound care nurse also changed his wound vac dressing today and reports it is at baseline. Of note, patient's post op course for renal transplant was complicated by wound dehiscence  on 8/1/2018 and subsequent wound vac placement.  Pt follows up at [facility] 1x a week for vac change, and a home visiting nurse comes to change the vac 2x a week (total 3x week vac change, last changed today (11/3/18). On past hospitalization 2 weeks prior, patient was found to have CT evidence of "left pelvic renal transplant with severe diffuse perinephric edema and perinephric pelvic fluid collection concerning for abscess secondary to a severe pyelonephritis. Left anterior abdominal wall rectus muscle abscess with air with a sinus track to left anterior abdominal wall. Patient was admitted to SICU with insulin gtt for glycemic controlled transitioned to lantus and humalog. Patient was treated with Vancomycin and Cefepime x 7 days, however negative Ucx, IR cx evidence of Polymorphonuclear cells and gm variable rods, transition to PO Levaquin for a total of 14 days. Patient reports he took all medications as prescribed.     Ortho was consulted for possible septic joint of the R ankle, however, no acute orthopedic surgical intervention was done as it was less likely septic joint because patient was improving on his own. Patient's fevers and RLE pain were most likely due to cellulitis, possibly 2/2 to toe ulcer on the R big toe. Patient was started on vanc and zosyn, and completed the course with Bactrim. An abdominal US was done due to patient's renal transplant, which showed an old an a new perinephric abscess. IR was consulted for drainage. CT guided drainage showed a small superficial cyst and approximately 2cc was drained and sent for culture. 68M with PMHx of DM2 (on insulin), HTN, HLD, afib on Eliquis, CAD s/p 2 vessel CABG in 2009, SALVADOR on CPAP, ESRD (2/2 DM) previously on HD x 4.5 yr previously s/p L side DDRT on  7/7/18 by Dr. Oswaldo Castellon at [facility] in [place], [place], with recent admission in October for sepsis 2/2 pyelonephritis and abd wall abscess s/p IR drainage of a perinephric abscess and wound vac placement presenting with fever 101.4 today at home. Patient reports he saw Podiatry today for his RT toe ulcer, with dressing changed today. Was walking home and began to have increased ankle swelling, and then while walking up stairs and pain at the Rt ankle. He denies known trauma to the area. He denies previous occurrence. He felt chills and more lethargic. He was checking his temperature every couple of hours. He started at 97.6 and then by the afternoon he felt lethargic and took a nap. He awoke from the nap and checked his temperature with a Tmax of 101.6. He then came to the ED right away. He denies chest pain, SOB, change in BMs, dysuria, hematuria. His wound care nurse also changed his wound vac dressing today and reports it is at baseline. Of note, patient's post op course for renal transplant was complicated by wound dehiscence  on 8/1/2018 and subsequent wound vac placement.  Pt follows up at [facility] 1x a week for vac change, and a home visiting nurse comes to change the vac 2x a week (total 3x week vac change, last changed today (11/3/18). On past hospitalization 2 weeks prior, patient was found to have CT evidence of "left pelvic renal transplant with severe diffuse perinephric edema and perinephric pelvic fluid collection concerning for abscess secondary to a severe pyelonephritis. Left anterior abdominal wall rectus muscle abscess with air with a sinus track to left anterior abdominal wall. Patient was admitted to SICU with insulin gtt for glycemic controlled transitioned to lantus and humalog. Patient was treated with Vancomycin and Cefepime x 7 days, however negative Ucx, IR cx evidence of Polymorphonuclear cells and gm variable rods, transition to PO Levaquin for a total of 14 days. Patient reports he took all medications as prescribed.     Ortho was consulted for possible septic joint of the R ankle, however, no acute orthopedic surgical intervention was done as it was less likely septic joint because patient was improving on his own. Patient's fevers and RLE pain were most likely due to cellulitis, possibly 2/2 to toe ulcer on the R big toe. Patient was started on vanc and zosyn, and completed the course with Keflex. An abdominal US was done due to patient's renal transplant, which showed an old an a new perinephric abscess. IR was consulted for drainage. CT guided drainage showed a small superficial cyst and approximately 2cc was drained and sent for culture.

## 2018-11-10 NOTE — DISCHARGE NOTE ADULT - MEDICATION SUMMARY - MEDICATIONS TO TAKE
Pt contacted, pt is scheduled for 1/15/18. She agrees and understand of the confusion. I will START or STAY ON the medications listed below when I get home from the hospital:    finasteride 5 mg oral tablet  -- 1 tab(s) by mouth once a day  -- Indication: For Urinary retention    predniSONE 5 mg oral tablet  -- 1 tab(s) by mouth once a day  -- Indication: For Renal transplant recipient    aspirin 81 mg oral tablet  -- 1 tab(s) by mouth once a day  -- Indication: For CAD    tamsulosin 0.4 mg oral capsule  -- 1 cap(s) by mouth once a day  -- Indication: For Urinary retention    isosorbide mononitrate 60 mg oral tablet, extended release  -- 1 tab(s) by mouth once a day  -- Indication: For Hypertension    Eliquis 5 mg oral tablet  -- 1 tab(s) by mouth 2 times a day  -- Indication: For Afib    Lantus 100 units/mL subcutaneous solution  -- 22 unit(s) subcutaneous once a day (at bedtime)  -- Indication: For Diabetes    NovoLOG FlexPen 100 units/mL injectable solution  -- Moderate Dose sliding Scale   Blood Sugar   151-200 2 units  201-250 4 units  251-300 6 units  301-350 8 units  351-400 10 units   >400 12 units and call physician  -- Indication: For Diabetes    NovoLOG FlexPen 100 units/mL injectable solution  -- 10 unit(s) injectable 3 times a day (before meals)  -- Indication: For Diabetes    rosuvastatin 5 mg oral tablet  -- 1 tab(s) by mouth once a day (at bedtime)  -- Indication: For Hyperlipidemia    ezetimibe 10 mg oral tablet  -- 1 tab(s) by mouth once a day  -- Indication: For Hyperlipidemia    valACYclovir 500 mg oral tablet  -- 1 tab(s) by mouth once a day  -- Indication: For Renal transplant recipient    amLODIPine 2.5 mg oral tablet  -- 1 tab(s) by mouth once a day  -- Indication: For Hypertension    furosemide 40 mg oral tablet  -- 1 tab(s) by mouth once a day  -- Indication: For Hypertension    mycophenolate mofetil 250 mg oral capsule  -- 3 cap(s) by mouth 2 times a day  -- Indication: For Renal transplant recipient    tacrolimus 0.5 mg oral capsule  -- 3 cap(s) by mouth every 12 hours  -- Indication: For Renal transplant recipient    docusate sodium 50 mg oral capsule  -- 2 cap(s) by mouth once a day  -- Indication: For Constipation    omeprazole 20 mg oral delayed release capsule  -- 1 cap(s) by mouth once a day  -- Indication: For GERD    sulfamethoxazole-trimethoprim 400 mg-80 mg oral tablet  -- 1 tab(s) by mouth once a day  -- Indication: For Renal transplant recipient    Multiple Vitamins oral tablet  -- 1 tab(s) by mouth once a day  -- Indication: For Vitamins    ergocalciferol 50,000 intl units (1.25 mg) oral capsule  -- 1 cap(s) by mouth once a week on Sundays  -- Indication: For Vitamin

## 2018-11-10 NOTE — PROGRESS NOTE ADULT - PROBLEM SELECTOR PROBLEM 3
Fever
Type 2 diabetes mellitus with diabetic peripheral angiopathy without gangrene, with long-term current use of insulin
Type 2 diabetes mellitus with diabetic peripheral angiopathy without gangrene, with long-term current use of insulin
Renal transplant recipient

## 2018-11-10 NOTE — DISCHARGE NOTE ADULT - CARE PROVIDERS DIRECT ADDRESSES
,DirectAddress_Unknown,DirectAddress_Unknown,DirectAddress_Unknown ,DirectAddress_Unknown,DirectAddress_Unknown,DirectAddress_Unknown,[email or web address]

## 2018-11-10 NOTE — PROGRESS NOTE ADULT - PROBLEM SELECTOR PROBLEM 9
SALVADOR on CPAP
Preventive measure
SALVADOR on CPAP
Preventive measure
SALVADOR on CPAP

## 2018-11-10 NOTE — DISCHARGE NOTE ADULT - CARE PROVIDER_API CALL
Dr. Oswaldo Castellon,   [facility]  Phone: (   )    -  Fax: (   )    -    Dr. Johnny Maciel,   Nephrologist  Phone: (   )    -  Fax: (   )    -    Dr. Chinmay Rosario,   [facility]  Phone: (   )    -  Fax: (   )    - Dr. Oswaldo Castellon,   [facility]  Phone: (   )    -  Fax: (   )    -    Dr. Johnny Maciel,   Nephrologist  Phone: (   )    -  Fax: (   )    -    Dr. Chinmay Rosario[facility]   [facility]  Phone: (   )    -  Fax: (   )    -    David Bravo), Infectious Disease; Internal Medicine  [address] [place] [address]  Phone: [phone]  Fax: [phone]

## 2018-11-10 NOTE — DISCHARGE NOTE ADULT - CARE PLAN
Principal Discharge DX:	Cellulitis of right lower extremity  Goal:	Improved, to continue monitoring  Secondary Diagnosis:	Abscess  Secondary Diagnosis:	Atrial fibrillation  Secondary Diagnosis:	Essential hypertension  Secondary Diagnosis:	Renal transplant recipient  Secondary Diagnosis:	Toe ulcer, right  Secondary Diagnosis:	Type 2 diabetes mellitus Principal Discharge DX:	Cellulitis of right lower extremity  Goal:	Improved, to continue monitoring  Assessment and plan of treatment:	You came in with fever, redness, swelling, and difficulty moving your right ankle. Xray showed that you did not have osteomyelitis or infection of the bone. This was most likely due to cellulitis of the R ankle, possibly from the ulcer on the right toe, which improved with antibiotics. You were started on vanc and zosyn and switched to Keflex. You stopped spiking fevers and the redness and swelling improved. You are now able to move the ankle and walk without any difficulty. If you have similar symptoms again, please return to the hospital. Please follow up with your outpatient doctor to ensure that you continue to improve.  Secondary Diagnosis:	Abscess  Goal:	Improved, to continue monitoring  Assessment and plan of treatment:	You had an abscess near your kidney on a previous admission, and had a wound vacuum placed which is draining fluid. An ultrasound of the abdomen was done to see if the abscess could be a reason for the fevers. Ultrasound of the abdomen showed no change in the old abscess, however a new one was found near the transplanted kidney. Interventional radiology was consulted for draining the abscess. CT guided IR drainage showed No drainable fluid collection. A limited US of the left lower quadrant demonstrated a superficial complex collection, which was aspirated yielding a scant amount of fluid (approximately 2 mL and sent for gram stain/culture. Please follow up with Infectious disease for these results. Continue to follow with your nephrologist and wound care for monitoring of the abscesses.  Secondary Diagnosis:	Atrial fibrillation  Goal:	Continue management  Assessment and plan of treatment:	You have a history of atrial fibrillation. Please continue to take your home meds and follow up with your primary care physician.  Secondary Diagnosis:	Essential hypertension  Goal:	Continue management  Assessment and plan of treatment:	You have a history of high blood pressure. Please continue to take your home meds and follow up with your primary care physician.  Secondary Diagnosis:	Renal transplant recipient  Goal:	Continue management  Assessment and plan of treatment:	You have a kidney transplant. It is important that you take all your medications for the transplant and follow up regularly with your kidney doctors. If you have symptoms of fevers, or infections, please come back to the hospital.  Secondary Diagnosis:	Toe ulcer, right  Goal:	Continue management  Assessment and plan of treatment:	You have an ulcer on the R big toe that is not infected at this time, however this could be the source of your cellulitis. Please follow with wound care to ensure that the ulcer does not worsen or get infected.  Secondary Diagnosis:	Type 2 diabetes mellitus  Goal:	Continue management  Assessment and plan of treatment:	You have a history of diabetes. Your HgbA1c is 7.4, which monitors your blood sugar levels in the past 3 months. Please continue to take your home meds and follow up with your primary care physician. Principal Discharge DX:	Cellulitis of right lower extremity  Goal:	Improved, to continue monitoring  Assessment and plan of treatment:	You came in with fever, redness, swelling, and difficulty moving your right ankle. Xray showed that you did not have osteomyelitis or infection of the bone. This was most likely due to cellulitis of the R ankle, possibly from the ulcer on the right toe, which improved with antibiotics. You were started on vanc and zosyn and switched to Bactrim. You stopped spiking fevers and the redness and swelling improved. You are now able to move the ankle and walk without any difficulty. If you have similar symptoms again, please return to the hospital. Please follow up with your outpatient doctor to ensure that you continue to improve.  Secondary Diagnosis:	Abscess  Goal:	Improved, to continue monitoring  Assessment and plan of treatment:	You had an abscess near your kidney on a previous admission, and had a wound vacuum placed which is draining fluid. An ultrasound of the abdomen was done to see if the abscess could be a reason for the fevers. Ultrasound of the abdomen showed no change in the old abscess, however a new one was found near the transplanted kidney. Interventional radiology was consulted for draining the abscess. CT guided IR drainage showed No drainable fluid collection. A limited US of the left lower quadrant demonstrated a superficial complex collection, which was aspirated yielding a scant amount of fluid (approximately 2 mL and sent for gram stain/culture. Please follow up with Infectious disease for these results. Continue to follow with your nephrologist and wound care for monitoring of the abscesses.  Secondary Diagnosis:	Atrial fibrillation  Goal:	Continue management  Assessment and plan of treatment:	You have a history of atrial fibrillation. Please continue to take your home meds and follow up with your primary care physician.  Secondary Diagnosis:	Essential hypertension  Goal:	Continue management  Assessment and plan of treatment:	You have a history of high blood pressure. Please continue to take your home meds and follow up with your primary care physician.  Secondary Diagnosis:	Renal transplant recipient  Goal:	Continue management  Assessment and plan of treatment:	You have a kidney transplant. It is important that you take all your medications for the transplant and follow up regularly with your kidney doctors. If you have symptoms of fevers, or infections, please come back to the hospital.  Secondary Diagnosis:	Toe ulcer, right  Goal:	Continue management  Assessment and plan of treatment:	You have an ulcer on the R big toe that is not infected at this time, however this could be the source of your cellulitis. Please follow with wound care to ensure that the ulcer does not worsen or get infected.  Secondary Diagnosis:	Type 2 diabetes mellitus  Goal:	Continue management  Assessment and plan of treatment:	You have a history of diabetes. Your HgbA1c is 7.4, which monitors your blood sugar levels in the past 3 months. Please continue to take your home meds and follow up with your primary care physician.

## 2018-11-10 NOTE — PROGRESS NOTE ADULT - PROBLEM SELECTOR PROBLEM 1
Abscess
Cellulitis of right lower extremity
Renal transplant recipient
Abscess
Cellulitis of right lower extremity
Cellulitis of right lower extremity

## 2018-11-10 NOTE — PROGRESS NOTE ADULT - PROBLEM SELECTOR PLAN 1
-New heterogeneous collection in the left lower quadrant in the region of   the transplant kidney measuring up to 6.7 cm. concerning for abscess but not proven yet.   -No significant change in the previously seen heterogeneous collection   lateral to the transplant kidney.  -s/p IR for drainage of abscess

## 2018-11-10 NOTE — DISCHARGE NOTE ADULT - SECONDARY DIAGNOSIS.
Essential hypertension Renal transplant recipient Toe ulcer, right Type 2 diabetes mellitus Abscess Atrial fibrillation

## 2018-11-10 NOTE — PROGRESS NOTE ADULT - ASSESSMENT
68M with PMHx of DM2 (on insulin), HTN, HLD, afib on Eliquis, CAD s/p 2 vessel CABG in 2009, SALVADOR on CPAP, ESRD (2/2 DM) previously on HD x 4.5 yr previously s/p L side DDRT on  7/7/18 by Dr. Oswaldo Castellon at [facility] in [place], [place], with recent admission in October for sepsis 2/2 pyelonephritis and abd wall abscess s/p IR drainage of a perinephric abscess and wound vac placement presenting with fever 101.4 admitted for sepsis likely 2/2 cellulitis with new fluid collection in LLQ found on abdominal US pending IR drainage today.

## 2018-11-10 NOTE — DISCHARGE NOTE ADULT - PLAN OF CARE
Improved, to continue monitoring You came in with fever, redness, swelling, and difficulty moving your right ankle. Xray showed that you did not have osteomyelitis or infection of the bone. This was most likely due to cellulitis of the R ankle, possibly from the ulcer on the right toe, which improved with antibiotics. You were started on vanc and zosyn and switched to Keflex. You stopped spiking fevers and the redness and swelling improved. You are now able to move the ankle and walk without any difficulty. If you have similar symptoms again, please return to the hospital. Please follow up with your outpatient doctor to ensure that you continue to improve. You had an abscess near your kidney on a previous admission, and had a wound vacuum placed which is draining fluid. An ultrasound of the abdomen was done to see if the abscess could be a reason for the fevers. Ultrasound of the abdomen showed no change in the old abscess, however a new one was found near the transplanted kidney. Interventional radiology was consulted for draining the abscess. CT guided IR drainage showed No drainable fluid collection. A limited US of the left lower quadrant demonstrated a superficial complex collection, which was aspirated yielding a scant amount of fluid (approximately 2 mL and sent for gram stain/culture. Please follow up with Infectious disease for these results. Continue to follow with your nephrologist and wound care for monitoring of the abscesses. Continue management You have a history of atrial fibrillation. Please continue to take your home meds and follow up with your primary care physician. You have a history of high blood pressure. Please continue to take your home meds and follow up with your primary care physician. You have a kidney transplant. It is important that you take all your medications for the transplant and follow up regularly with your kidney doctors. If you have symptoms of fevers, or infections, please come back to the hospital. You have an ulcer on the R big toe that is not infected at this time, however this could be the source of your cellulitis. Please follow with wound care to ensure that the ulcer does not worsen or get infected. You have a history of diabetes. Your HgbA1c is 7.4, which monitors your blood sugar levels in the past 3 months. Please continue to take your home meds and follow up with your primary care physician. You came in with fever, redness, swelling, and difficulty moving your right ankle. Xray showed that you did not have osteomyelitis or infection of the bone. This was most likely due to cellulitis of the R ankle, possibly from the ulcer on the right toe, which improved with antibiotics. You were started on vanc and zosyn and switched to Bactrim. You stopped spiking fevers and the redness and swelling improved. You are now able to move the ankle and walk without any difficulty. If you have similar symptoms again, please return to the hospital. Please follow up with your outpatient doctor to ensure that you continue to improve.

## 2018-11-10 NOTE — PROGRESS NOTE ADULT - PROBLEM SELECTOR PLAN 5
Hx of CAD w/ CABG, on ASA  No active issues, with lower extremity swelling, on Lasix daily likely 2/2 to venous stasis  Continue home medications
Hx of CAD w/ CABG, on ASA (held for IR procedure)  No active issues, with lower extremity swelling, on Lasix daily likely 2/2 to venous stasis  Continue home medications
Hx of CAD w/ CABG, on ASA (held for IR procedure)  No active issues, with lower extremity swelling, on Lasix daily likely 2/2 to venous stasis  Continue home medications
Hx of CAD w/ CABG, on ASA  No active issues, with lower extremity swelling, on Lasix daily likely 2/2 to venous stasis  Continue home medications

## 2018-11-10 NOTE — PROGRESS NOTE ADULT - PROBLEM SELECTOR PROBLEM 4
Ulcer of toe of right foot, unspecified ulcer stage
Fever
Fever
Ulcer of toe of right foot, unspecified ulcer stage

## 2018-11-10 NOTE — DISCHARGE NOTE ADULT - PATIENT PORTAL LINK FT
You can access the [facility] Patient Portal, offered by [facility], by registering with the following website: [email or web address]

## 2018-11-10 NOTE — PROGRESS NOTE ADULT - PROBLEM SELECTOR PROBLEM 5
Coronary artery disease involving native coronary artery of native heart without angina pectoris

## 2018-11-10 NOTE — PROGRESS NOTE ADULT - ATTENDING COMMENTS
76M pmhx of renal transplant approx 4 months ago, DM, HTN, HLD Afib on eliquis CAD< SALVADOR on Cpap at night admitted for sepsis 2/2 ulcer vs. septic joint vs. abd abscess  -F/U abd imaging and ankle US   -Ortho says no septic joint or indication for tap  -unlikely gout as he is not being treated for this and is improving (besides daily 5mg of Prednisone which he was on)  -Pt consulted to change wound vac tomorrow  -F/U renal appreciate recs   -F/U ID appreciate recs
Reviewed available clincial and lab data, now afebrile, right foot xrays noted, abdominal sonogram reviewed. Noted slight rise in creatinine  reviewed immunosuppression and allograft function  Plan:  Continue current immunosuppression  recheck blood chemistry  On discharge will follow with primary nephrologist/transplant team  I was present during and reviewed clinical and lab data as well as assessment and plan as documented . Please contact if any additional questions with any change in clinical condition or on availability of any additional information or reports.
Patient with functioning renal allograft, reviewed immunosuppresison and allograft function.  Suggest:  Abdominal sonogram  F/u cultures  D/w pr team house staff  Continue current immunosuppression  I was present during and reviewed clinical and lab data as well as assessment and plan as documented . Please contact if any additional questions with any change in clinical condition or on availability of any additional information or reports.
Renal transplant Recipient  Stable creatinine  Planned for I/R drainage of collection  Reviewed immunosuppression and allograft function.  I was present during and reviewed clinical and lab data as well as assessment and plan as documented . Please contact if any additional questions with any change in clinical condition or on availability of any additional information or reports.
Pending results of aspiration will determine antibiotic approach.  Pt looks well, cellulitis improving.  Anticipate discharge tomorrow pending aspiration results.
Awaiting IR eval of new fluid collection in LLQ.  Pt clinically significantly improved. Cellulitis of ankle significant improved with Keflex.
s/p IR drainage without complication. Pt medically stable for discharge with wound vac and close follow up with IR and ID upon discharge. Pt in agreement with the plan.    51 minutes spent on discharge process    Lida Deal MD  Division of Hospital Medicine  Pager: [phone]  Office: [phone]
Pt needs drainage of fluid collection prior to D/C.  Should complete course of Keflex for ankle cellulitis which is significantly improved. Appreciate IR drainage and sending fluid for culture.
Abd US shows new fluid collection (phlegmon vs. abscess) in left lower quadrant.  Will call IR to attempt drainage.  Appreciate ID recs.
Keyanna Murray DO  [facility]  [phone]

## 2018-11-10 NOTE — PROGRESS NOTE ADULT - PROBLEM SELECTOR PLAN 7
Hx of Afib, currently irregular, rate controlled  Continue home medications with holding parameters  BP at goal   Continue Eliquis
Hx of Afib, currently irregular, rate controlled  Continue home medications with holding parameters  BP at goal   Continue Eliquis (held for IR procedure)
Hx of Afib, currently irregular, rate controlled  Continue home medications with holding parameters  BP at goal   Continue Eliquis (held for IR procedure)
Hx of Afib, currently irregular, rate controlled  Continue home medications with holding parameters  BP at goal   Continue Eliquis

## 2018-11-10 NOTE — PROGRESS NOTE ADULT - PROBLEM SELECTOR PROBLEM 2
Immunosuppression
Cellulitis of right lower extremity
Sepsis, due to unspecified organism
Cellulitis of right lower extremity
Sepsis, due to unspecified organism
Sepsis, due to unspecified organism

## 2018-11-10 NOTE — PROGRESS NOTE ADULT - PROBLEM SELECTOR PLAN 9
Hx of CPAP  Continue home settings qhs  No active issues
DVT PPX: Eliquis  Diet: DASH/Diabetic  SALVADOR on CPAP at night
DVT PPX: Eliquis held for procedure today   Diet: DASH/Diabetic  SALVADOR on CPAP at night
DVT PPX: Eliquis held for procedure today   Diet: DASH/Diabetic  SALVADOR on CPAP at night    Jodie Marrero MD  PGY-2, Internal Medicine  Pager# [phone]
Hx of CPAP  Continue home settings qhs  No active issues
DVT PPX: Eliquis held for procedure today   Diet: DASH/Diabetic  SALVADOR on CPAP at night
Hx of CPAP  Continue home settings qhs  No active issues

## 2018-11-10 NOTE — PROGRESS NOTE ADULT - PROBLEM SELECTOR PLAN 8
Hx of HTN  BP currently at goal  Continue home medications   No active issues

## 2018-11-10 NOTE — DISCHARGE NOTE ADULT - OTHER SIGNIFICANT FINDINGS
< from: CT Abdomen and Pelvis No Cont (10.10.18 @ 18:24) >    INTERPRETATION:  Non contrast CT of the abdomen and pelvis     COMPARISON: None.    CLINICAL HISTORY: Sepsis. Anterior abdominal wall draining wound with   cellulitis.    Technique: contiguous axial images were obtained with 5.0 mm slice   thickness without intravenous contrast administration, which limits the   visualization of the intra-abdominal structures. Oral contrast   administered.  Coronal and sagittal reformats were also submitted for interpretation.      FINDINGS:   There is a LEFT pelvic transplanted kidney with the severe diffuse   perinephric pelvic inflammatory edema with a LEFT anterolateral of   loculated fluid collection measuring 9.1 x 2 cm in diameter concerning   for a perinephric abscess. No stone identified. I there is also LEFT   lower pelvic rectus muscle abscess with diffuse swelling, the multiple   pockets of air and a draining sinus in the LEFT anterior pelvic region   with diffuse wall thickening.  No gross hydronephrosis identified. The bladder is displaced towards the   RIGHT..  Native kidneys are severely atrophic and nonfunctioning.    There is a mild LEFT pleural effusion.  There is no free intra-abdominal air or ascites.     The unopacified liver, spleen, pancreas, adrenal glands are normal.   Status post cholecystectomy.  There is no intra or extrahepatic biliary ductal dilatation.    The stomach, duodenum, small, and large bowel and appendix are normal.    .        Prostate and seminal vesicles within normal limits.    There are no retroperitoneal masses or abnormal lymphadenopathy.  The retroperitoneal vessels show atherosclerotic calcified plaques   throughout  nondilated abdominal aorta, mesenteric vessels and iliac   arteries. Osseous structures intact.   IMPRESSION:     LEFT pelvic renal transplant shows severe diffuse perinephric edema with   the LEFT perinephric pelvic fluid collection concerning for abscess   secondary to a severe pyelonephritis .  No hydronephrosis or stone disease seen .  No bladder outlet obstruction.  LEFT anterior abdominal wall rectus muscle abscess with air with a sinus   track LEFT anterior abdominal wall.    < end of copied text >

## 2018-11-10 NOTE — DISCHARGE NOTE ADULT - PROVIDER TOKENS
FREE:[LAST:[Dr. Oswaldo Castellon],PHONE:[(   )    -],FAX:[(   )    -],ADDRESS:[[facility]]],FREE:[LAST:[Dr. Johnny Maciel],PHONE:[(   )    -],FAX:[(   )    -],ADDRESS:[Nephrologist]],FREE:[LAST:[Dr. Chinmay Rosario],PHONE:[(   )    -],FAX:[(   )    -],ADDRESS:[[facility] [facility] [place] [facility] [place] FREE:[LAST:[Dr. Oswaldo Castellon],PHONE:[(   )    -],FAX:[(   )    -],ADDRESS:[[facility]]],FREE:[LAST:[Dr. Johnny Maciel],PHONE:[(   )    -],FAX:[(   )    -],ADDRESS:[Nephrologist]],FREE:[LAST:[Dr. Chinmay Rosario],PHONE:[(   )    -],FAX:[(   )    -],ADDRESS[facility]]],TOKEN:'[id number]'

## 2018-11-10 NOTE — PROGRESS NOTE ADULT - REASON FOR ADMISSION
fever

## 2018-11-10 NOTE — PROGRESS NOTE ADULT - PROVIDER SPECIALTY LIST ADULT
Infectious Disease
Internal Medicine
Intervent Radiology
Orthopedics
Transplant Medicine
Intervent Radiology
Infectious Disease
Infectious Disease
Internal Medicine

## 2018-11-10 NOTE — PROGRESS NOTE ADULT - SUBJECTIVE AND OBJECTIVE BOX
Patient is a 68y old  Male who presents with a chief complaint of fever (10 Nov 2018 03:24)    SUBJECTIVE / OVERNIGHT EVENTS:  No acute events o/n. Patient denies abdominal pain. Tolerated IR drainage of abscess well. Eager to go home.     MEDICATIONS  (STANDING):  amLODIPine   Tablet 2.5 milliGRAM(s) Oral daily  atorvastatin 20 milliGRAM(s) Oral at bedtime  dextrose 5%. 1000 milliLiter(s) (50 mL/Hr) IV Continuous <Continuous>  dextrose 50% Injectable 12.5 Gram(s) IV Push once  dextrose 50% Injectable 25 Gram(s) IV Push once  dextrose 50% Injectable 25 Gram(s) IV Push once  docusate sodium 100 milliGRAM(s) Oral two times a day  finasteride 5 milliGRAM(s) Oral daily  furosemide    Tablet 40 milliGRAM(s) Oral daily  insulin glargine Injectable (LANTUS) 18 Unit(s) SubCutaneous at bedtime  insulin lispro (HumaLOG) corrective regimen sliding scale   SubCutaneous at bedtime  insulin lispro (HumaLOG) corrective regimen sliding scale   SubCutaneous three times a day before meals  insulin lispro Injectable (HumaLOG) 9 Unit(s) SubCutaneous three times a day with meals  isosorbide   mononitrate ER Tablet (IMDUR) 60 milliGRAM(s) Oral daily  multivitamin 1 Tablet(s) Oral daily  mycophenolate mofetil 750 milliGRAM(s) Oral two times a day  pantoprazole    Tablet 40 milliGRAM(s) Oral before breakfast  predniSONE   Tablet 5 milliGRAM(s) Oral daily  tacrolimus 1.5 milliGRAM(s) Oral every 12 hours  tamsulosin 0.4 milliGRAM(s) Oral at bedtime  trimethoprim   80 mG/sulfamethoxazole 400 mG 1 Tablet(s) Oral daily  valACYclovir 500 milliGRAM(s) Oral daily    MEDICATIONS  (PRN):  dextrose 40% Gel 15 Gram(s) Oral once PRN Blood Glucose LESS THAN 70 milliGRAM(s)/deciliter  glucagon  Injectable 1 milliGRAM(s) IntraMuscular once PRN Glucose LESS THAN 70 milligrams/deciliter      Vital Signs Last 24 Hrs  T(C): 36.4 (10 Nov 2018 09:10), Max: 36.6 (09 Nov 2018 21:00)  T(F): 97.5 (10 Nov 2018 09:10), Max: 97.9 (10 Nov 2018 05:35)  HR: 72 (10 Nov 2018 09:10) (54 - 86)  BP: 157/64 (10 Nov 2018 09:10) (144/65 - 172/77)  BP(mean): --  RR: 18 (10 Nov 2018 09:10) (18 - 18)  SpO2: 95% (10 Nov 2018 09:10) (94% - 99%)      PHYSICAL EXAM  GENERAL: NAD, well-developed  EYES: EOMI, PERRLA, conjunctiva and sclera clear  NECK:  No JVD  CHEST/LUNG: CTABL ; No wheeze  HEART: RRR; No murmurs  ABDOMEN: Soft, Nontender, Nondistended; Bowel sounds present, Wound vac in LLQ, slight erythema around wound vac site, no pain or tenderness, no discharge.   : No Trimble  EXTREMITIES:  2+ Peripheral Pulses, 1+ edema b/l LE  PSYCH: AAOx3  NEUROLOGY: non-focal  SKIN: ulcer on the bottom of R big toe and R dorsal aspect, noninfected     CAPILLARY BLOOD GLUCOSE      POCT Blood Glucose.: 190 mg/dL (10 Nov 2018 07:38)  POCT Blood Glucose.: 127 mg/dL (09 Nov 2018 20:13)  POCT Blood Glucose.: 141 mg/dL (09 Nov 2018 16:29)  POCT Blood Glucose.: 160 mg/dL (09 Nov 2018 11:49)    I&O's Summary    09 Nov 2018 07:01  -  10 Nov 2018 07:00  --------------------------------------------------------  IN: 480 mL / OUT: 700 mL / NET: -220 mL        LABS:                        11.6   4.84  )-----------( 200      ( 10 Nov 2018 08:22 )             38.3     11-10    137  |  104  |  24<H>  ----------------------------<  208<H>  4.3   |  22  |  1.14    Ca    9.9      10 Nov 2018 07:09  Phos  2.7     11-10  Mg     1.9     11-10      PT/INR - ( 09 Nov 2018 09:30 )   PT: 14.9 sec;   INR: 1.32 ratio         PTT - ( 09 Nov 2018 09:30 )  PTT:29.7 sec

## 2018-11-10 NOTE — PROGRESS NOTE ADULT - PROBLEM SELECTOR PLAN 2
Patient with R ankle swelling, erythema with pain on ROM, now improving, stable  Differential includes cellulitis vs. septic joint, less likely septic joint  XRAY no evidence of OM or soft tissue swelling, ESR, CRP elevated 7.38  Ortho No acute orthopedic surgical intervention at this time, less likely septic joint given clinical improvement   As per ID, patient switched to Keflex, day 4/5 Patient with R ankle swelling, erythema with pain on ROM, now improving, stable  Differential includes cellulitis vs. septic joint, less likely septic joint  XRAY no evidence of OM or soft tissue swelling, ESR, CRP elevated 7.38  Ortho No acute orthopedic surgical intervention at this time, less likely septic joint given clinical improvement   As per ID, patient switched to Keflex, day 5/5

## 2019-05-07 ENCOUNTER — APPOINTMENT (OUTPATIENT)
Dept: RADIOLOGY | Facility: CLINIC | Age: 69
End: 2019-05-07
Payer: MEDICARE

## 2019-05-07 ENCOUNTER — OUTPATIENT (OUTPATIENT)
Dept: OUTPATIENT SERVICES | Facility: HOSPITAL | Age: 69
LOS: 1 days | End: 2019-05-07
Payer: MEDICARE

## 2019-05-07 DIAGNOSIS — S81.802A UNSPECIFIED OPEN WOUND, LEFT LOWER LEG, INITIAL ENCOUNTER: Chronic | ICD-10-CM

## 2019-05-07 DIAGNOSIS — Z98.89 OTHER SPECIFIED POSTPROCEDURAL STATES: Chronic | ICD-10-CM

## 2019-05-07 DIAGNOSIS — Z95.1 PRESENCE OF AORTOCORONARY BYPASS GRAFT: Chronic | ICD-10-CM

## 2019-05-07 DIAGNOSIS — I77.0 ARTERIOVENOUS FISTULA, ACQUIRED: Chronic | ICD-10-CM

## 2019-05-07 DIAGNOSIS — Z00.8 ENCOUNTER FOR OTHER GENERAL EXAMINATION: ICD-10-CM

## 2019-05-07 DIAGNOSIS — L97.519 NON-PRESSURE CHRONIC ULCER OF OTHER PART OF RIGHT FOOT WITH UNSPECIFIED SEVERITY: Chronic | ICD-10-CM

## 2019-05-07 DIAGNOSIS — Z94.0 KIDNEY TRANSPLANT STATUS: Chronic | ICD-10-CM

## 2019-05-07 PROCEDURE — 77080 DXA BONE DENSITY AXIAL: CPT | Mod: 26

## 2019-05-07 PROCEDURE — 77080 DXA BONE DENSITY AXIAL: CPT

## 2020-01-14 NOTE — ED ADULT NURSE NOTE - TOBACCO SCREENING (FROM THE ASSIST)
Blood culture collected on January 13, Anerobic bottle. Gram Positive Cocci in clusters Statement Selected

## 2020-01-30 ENCOUNTER — APPOINTMENT (OUTPATIENT)
Dept: ORTHOPEDIC SURGERY | Facility: CLINIC | Age: 70
End: 2020-01-30

## 2020-02-07 ENCOUNTER — APPOINTMENT (OUTPATIENT)
Dept: ORTHOPEDIC SURGERY | Facility: CLINIC | Age: 70
End: 2020-02-07
Payer: MEDICARE

## 2020-02-07 VITALS
BODY MASS INDEX: 38.51 KG/M2 | DIASTOLIC BLOOD PRESSURE: 71 MMHG | SYSTOLIC BLOOD PRESSURE: 153 MMHG | HEART RATE: 60 BPM | WEIGHT: 260 LBS | HEIGHT: 69 IN

## 2020-02-07 DIAGNOSIS — M70.72 OTHER BURSITIS OF HIP, LEFT HIP: ICD-10-CM

## 2020-02-07 PROCEDURE — 99213 OFFICE O/P EST LOW 20 MIN: CPT

## 2020-02-07 PROCEDURE — 73502 X-RAY EXAM HIP UNI 2-3 VIEWS: CPT | Mod: 26,LT

## 2020-02-07 RX ORDER — ISOSORBIDE MONONITRATE 30 MG/1
30 TABLET, EXTENDED RELEASE ORAL
Qty: 90 | Refills: 0 | Status: ACTIVE | COMMUNITY
Start: 2019-08-10

## 2020-02-07 RX ORDER — INSULIN GLARGINE 100 [IU]/ML
100 INJECTION, SOLUTION SUBCUTANEOUS
Qty: 30 | Refills: 0 | Status: ACTIVE | COMMUNITY
Start: 2020-01-22

## 2020-02-07 RX ORDER — FIDAXOMICIN 200 MG/1
200 TABLET, FILM COATED ORAL
Qty: 20 | Refills: 0 | Status: ACTIVE | COMMUNITY
Start: 2019-11-29

## 2020-02-07 RX ORDER — TACROLIMUS 1 MG/1
1 CAPSULE ORAL
Qty: 60 | Refills: 0 | Status: ACTIVE | COMMUNITY
Start: 2019-07-11

## 2020-02-07 RX ORDER — FLASH GLUCOSE SENSOR
KIT MISCELLANEOUS
Qty: 1 | Refills: 0 | Status: ACTIVE | COMMUNITY
Start: 2019-04-10

## 2020-02-07 RX ORDER — AMLODIPINE BESYLATE 5 MG/1
5 TABLET ORAL
Qty: 180 | Refills: 0 | Status: ACTIVE | COMMUNITY
Start: 2019-09-06

## 2020-02-07 RX ORDER — ALLOPURINOL 300 MG/1
300 TABLET ORAL
Qty: 30 | Refills: 0 | Status: ACTIVE | COMMUNITY
Start: 2019-12-13

## 2020-02-07 RX ORDER — TORSEMIDE 20 MG/1
20 TABLET ORAL
Qty: 360 | Refills: 0 | Status: ACTIVE | COMMUNITY
Start: 2019-08-21

## 2020-02-07 RX ORDER — MYCOPHENOLATE MOFETIL 250 MG/1
250 CAPSULE ORAL
Qty: 180 | Refills: 0 | Status: ACTIVE | COMMUNITY
Start: 2019-06-27

## 2020-02-07 RX ORDER — TAMSULOSIN HYDROCHLORIDE 0.4 MG/1
0.4 CAPSULE ORAL
Qty: 90 | Refills: 0 | Status: ACTIVE | COMMUNITY
Start: 2020-01-13

## 2020-02-07 RX ORDER — CLOPIDOGREL BISULFATE 75 MG/1
75 TABLET, FILM COATED ORAL
Qty: 90 | Refills: 0 | Status: ACTIVE | COMMUNITY
Start: 2019-10-17

## 2020-02-07 RX ORDER — VANCOMYCIN HYDROCHLORIDE 250 MG/1
250 CAPSULE ORAL
Qty: 40 | Refills: 0 | Status: ACTIVE | COMMUNITY
Start: 2019-11-28

## 2020-02-07 RX ORDER — HYOSCYAMINE SULFATE 0.12 MG/1
0.12 TABLET SUBLINGUAL
Qty: 100 | Refills: 0 | Status: ACTIVE | COMMUNITY
Start: 2019-12-12

## 2020-02-07 RX ORDER — TACROLIMUS 0.5 MG/1
0.5 CAPSULE ORAL
Qty: 60 | Refills: 0 | Status: ACTIVE | COMMUNITY
Start: 2019-07-25

## 2020-02-07 RX ORDER — PREDNISONE 5 MG/1
5 TABLET ORAL
Qty: 30 | Refills: 0 | Status: ACTIVE | COMMUNITY
Start: 2019-07-11

## 2020-02-07 RX ORDER — POLYETHYLENE GLYCOL-3350 AND ELECTROLYTES 236; 6.74; 5.86; 2.97; 22.74 G/274.31G; G/274.31G; G/274.31G; G/274.31G; G/274.31G
236 POWDER, FOR SOLUTION ORAL
Qty: 4000 | Refills: 0 | Status: ACTIVE | COMMUNITY
Start: 2020-01-05

## 2020-02-07 RX ORDER — RIVAROXABAN 15 MG/1
15 TABLET, FILM COATED ORAL
Qty: 30 | Refills: 0 | Status: ACTIVE | COMMUNITY
Start: 2019-08-31

## 2020-02-07 RX ORDER — METOPROLOL SUCCINATE 25 MG/1
25 TABLET, EXTENDED RELEASE ORAL
Qty: 90 | Refills: 0 | Status: ACTIVE | COMMUNITY
Start: 2019-12-24

## 2020-02-07 RX ORDER — INSULIN ASPART 100 [IU]/ML
100 INJECTION, SOLUTION INTRAVENOUS; SUBCUTANEOUS
Qty: 45 | Refills: 0 | Status: ACTIVE | COMMUNITY
Start: 2020-01-21

## 2020-02-07 RX ORDER — PEN NEEDLE, DIABETIC 29 G X1/2"
31G X 8 MM NEEDLE, DISPOSABLE MISCELLANEOUS
Qty: 200 | Refills: 0 | Status: ACTIVE | COMMUNITY
Start: 2019-08-10

## 2020-02-07 RX ORDER — SODIUM BICARBONATE 650 MG/1
650 TABLET ORAL
Qty: 120 | Refills: 0 | Status: ACTIVE | COMMUNITY
Start: 2020-01-24

## 2020-02-07 NOTE — PHYSICAL EXAM
[FreeTextEntry2] : The patient appears well nourished  and in no apparent distress.  The patient is alert and oriented to person, place, and time.   Affect and mood appear normal.    The head is normocephalic and atraumatic.  The eyes reveal normal sclera and extra ocular muscles are intact. The tongue is midline with no apparent lesions.  Skin shows normal turgor with no evidence of eczema or psoriasis.  No respiratory distress noted.  Sensation grossly intact.\par \par Exam of the left hip shows tenderness over the greater trochanter to palpation.  No pain with passive hip range of motion.  [de-identified] : X-ray pelvis and 2 views left hip: shows a well-preserved left hip joint space.\par \par MRI left hip March 2017 shows a partial thickness tear gluteus medius with intramuscular hematoma.

## 2020-02-07 NOTE — DISCUSSION/SUMMARY
[de-identified] : The patient is a 69-year-old male with left hip trochanteric bursitis. Due to his multiple medical comorbidities we are going to try a conservative approach. I recommended a course of physical therapy a prescription was provided for strengthening as well as IT band and tensor fascia stretching. We discussed the use of ice and over-the-counter topical ointments. If symptoms fail to resolve with these conservative measures he will return to the office for a local cortisone injection. He was made aware of any changes in his clinical condition that would warrant urgent evaluation or intervention. Followup as needed. He notes good understanding and agreement with the plan of care.

## 2020-02-07 NOTE — HISTORY OF PRESENT ILLNESS
[de-identified] : The patient is a 69-year-old male who presents for evaluation of his left hip. He was originally seen in the office in June of 2018 forward gluteal tendinitis. At that time he had been given a prescription for physical therapy. Patient reports an extensive past medical history. He was unable to comply with physical therapy. He reports since his last is to the office he has had a kidney transplant, cardiac stenting, pacemaker insertion, hospitalization for UTI, hospitalization for cellulitis and C. difficile. He is on Zaroxolyn Plavix and must avoid anti-inflammatories. He is an insulin-dependent diabetic. He reports hemoglobin A1c is 6.4. He denies recent injury or trauma. He does report over the past 2 months pain along the lateral aspect of the hip with radiation down the thigh. He has no difficulty lying on that side at night. He feels he compensates when he walks secondary to the pain and will limp at times. He has difficulty ambulating stairs. He also notes with extensive walking on a hard surface pain will become exacerbated. He does note that when he was performing cardiac rehabilitation, symptoms are better. He has not been following up with a home exercise program.

## 2020-05-04 ENCOUNTER — APPOINTMENT (OUTPATIENT)
Dept: RADIOLOGY | Facility: CLINIC | Age: 70
End: 2020-05-04

## 2020-05-04 ENCOUNTER — APPOINTMENT (OUTPATIENT)
Dept: ULTRASOUND IMAGING | Facility: CLINIC | Age: 70
End: 2020-05-04

## 2020-05-18 ENCOUNTER — RESULT REVIEW (OUTPATIENT)
Age: 70
End: 2020-05-18

## 2020-06-12 DIAGNOSIS — Z01.818 ENCOUNTER FOR OTHER PREPROCEDURAL EXAMINATION: ICD-10-CM

## 2020-06-13 ENCOUNTER — APPOINTMENT (OUTPATIENT)
Dept: DISASTER EMERGENCY | Facility: CLINIC | Age: 70
End: 2020-06-13

## 2020-06-14 LAB — SARS-COV-2 N GENE NPH QL NAA+PROBE: NOT DETECTED

## 2020-06-16 ENCOUNTER — RESULT REVIEW (OUTPATIENT)
Age: 70
End: 2020-06-16

## 2020-08-17 ENCOUNTER — RESULT REVIEW (OUTPATIENT)
Age: 70
End: 2020-08-17

## 2021-01-01 ENCOUNTER — APPOINTMENT (OUTPATIENT)
Dept: ORTHOPEDIC SURGERY | Facility: CLINIC | Age: 71
End: 2021-01-01
Payer: MEDICARE

## 2021-01-01 ENCOUNTER — RESULT REVIEW (OUTPATIENT)
Age: 71
End: 2021-01-01

## 2021-01-01 ENCOUNTER — APPOINTMENT (OUTPATIENT)
Dept: OPHTHALMOLOGY | Facility: CLINIC | Age: 71
End: 2021-01-01
Payer: MEDICARE

## 2021-01-01 ENCOUNTER — APPOINTMENT (OUTPATIENT)
Dept: PULMONOLOGY | Facility: CLINIC | Age: 71
End: 2021-01-01

## 2021-01-01 ENCOUNTER — APPOINTMENT (OUTPATIENT)
Dept: OPHTHALMOLOGY | Facility: CLINIC | Age: 71
End: 2021-01-01

## 2021-01-01 ENCOUNTER — NON-APPOINTMENT (OUTPATIENT)
Age: 71
End: 2021-01-01

## 2021-01-01 ENCOUNTER — TRANSCRIPTION ENCOUNTER (OUTPATIENT)
Age: 71
End: 2021-01-01

## 2021-01-01 VITALS
DIASTOLIC BLOOD PRESSURE: 72 MMHG | WEIGHT: 260 LBS | BODY MASS INDEX: 38.51 KG/M2 | HEIGHT: 69 IN | SYSTOLIC BLOOD PRESSURE: 132 MMHG | HEART RATE: 79 BPM

## 2021-01-01 DIAGNOSIS — M67.952 UNSPECIFIED DISORDER OF SYNOVIUM AND TENDON, LEFT THIGH: ICD-10-CM

## 2021-01-01 PROCEDURE — 99213 OFFICE O/P EST LOW 20 MIN: CPT

## 2021-01-01 PROCEDURE — 92014 COMPRE OPH EXAM EST PT 1/>: CPT

## 2021-01-01 PROCEDURE — 92134 CPTRZ OPH DX IMG PST SGM RTA: CPT

## 2021-01-01 PROCEDURE — 73502 X-RAY EXAM HIP UNI 2-3 VIEWS: CPT

## 2021-01-01 RX ORDER — CEPHALEXIN 500 MG/1
500 CAPSULE ORAL 3 TIMES DAILY
Qty: 30 | Refills: 0 | Status: DISCONTINUED | COMMUNITY
Start: 2017-02-27 | End: 2021-01-01

## 2021-10-08 NOTE — ED ADULT NURSE NOTE - NEURO MENTATION
Reason for Call:  Medication or medication refill:    Do you use a [facility]? No.  Name of the pharmacy and phone number for the current request:  [id number] [place], [place] - [address] AT [facility]  [phone]    Name of the medication requested: insulin glargine (LANTUS PEN) 100 UNIT/ML pen 3 mL     Other request: Patient only has 1 dose left for one more - needs it ASAP.     Can we leave a detailed message on this number? YES    Phone number patient can be reached at: Cell number on file:    Telephone Information:   Mobile [phone]       Best Time: ANY    Call taken on 10/8/2021 at 10:27 AM by Campos Rome       normal

## 2021-11-18 PROBLEM — M67.952 TENDINOPATHY OF LEFT GLUTEUS MEDIUS: Status: ACTIVE | Noted: 2021-01-01

## 2021-11-18 NOTE — CONSULT LETTER
[Dear  ___] : Dear  [unfilled], [Consult Letter:] : I had the pleasure of evaluating your patient, [unfilled]. [Please see my note below.] : Please see my note below. [Consult Closing:] : Thank you very much for allowing me to participate in the care of this patient.  If you have any questions, please do not hesitate to contact me. [Sincerely,] : Sincerely, [FreeTextEntry2] : YESSICA GUPTA MD\par  [FreeTextEntry3] : Aaron Frey MD\par Chief of Joint Replacement\par [facility] Knee Replacement \par [facility] [place]\par \par

## 2021-11-18 NOTE — ADDENDUM
[FreeTextEntry1] : This note was authored by Richard Ames working as a medical scribe for Dr. Aaron Frey. The note was reviewed, edited, and revised by Dr. Aaron Frey whom is in agreement with the exam findings, imaging findings, and treatment plan. 11/18/2021

## 2021-11-18 NOTE — HISTORY OF PRESENT ILLNESS
[de-identified] : The patient is a 69-year-old male who presents for evaluation of his left hip pain. Patient localizes the pain laterally, and notes it is worse with weightbearing activity as well as laying on the left side. He states his biggest concern is weakness of the left hip. He has difficulty going up the stairs. Patient has not had physical therapy or injections. He denies any falls or trauma. He denies any groin pain at this time. \par Patient reports an extensive past medical history including a kidney transplant, now on dialysis, cardiac stenting, pacemaker insertion, hospitalization for UTI, hospitalization for cellulitis and C. difficile. He is status post left great toe amputation for infection, currently has an open ulcer to the right lower extremity, and is on antibiotics.

## 2021-11-18 NOTE — PHYSICAL EXAM
[de-identified] : The patient appears well nourished  and in no apparent distress.  The patient is alert and oriented to person, place, and time.   Affect and mood appear normal. The head is normocephalic and atraumatic.  The eyes reveal normal sclera and extra ocular muscles are intact. The tongue is midline with no apparent lesions.  Skin shows normal turgor with no evidence of eczema or psoriasis.  No respiratory distress noted.  Sensation grossly intact.		  [de-identified] : Exam left hip: Skin is within normal limits, SLR is smooth and intact. Flexion to 90 degrees, external rotation to 45, internal rotation to 25, all without pain. There is mild tenderness to the GT bursa.  [de-identified] : X-ray: AP view of the pelvis and 2 views of the left hip demonstrate a well preserved joint space.

## 2021-11-18 NOTE — DISCUSSION/SUMMARY
[de-identified] : LEIGH CABRERA is a 71 year male who presents with left hip weakness. Conservative options were discussed. It seems at this time that his biggest issue is muscle weakness rather than pain. A prescription for physical therapy was provided focusing on gluteal strengthening. He will try this and use Tylenol as needed. Follow up as needed.

## 2022-01-01 ENCOUNTER — RESULT REVIEW (OUTPATIENT)
Age: 72
End: 2022-01-01

## 2022-02-04 NOTE — DIETITIAN INITIAL EVALUATION ADULT. - PROBLEM SELECTOR PLAN 9
Hx of CPAP  Continue home settings qhs  No active issues Pt reports she is taking zoloft and atarax for anxiety and depression, states her zoloft was upped to 150mg 4 days ago. States since upping her medication she feels \"worse. \" States she is having mood swings and \"hallucinations where when I'm driving it doesn't feel like I'm driving. \" Denies suicidal ideations.

## 2022-04-03 NOTE — H&P ADULT - NSHPOUTPATIENTPROVIDERS_GEN_ALL_CORE
Dr. Johnny Maciel - nephrologist; Dr. Oswaldo Castellon - renal transplant surgeon at [facility] in [place], [place];  Dr. Chinmay Rosario - wound care at [facility] [id number]

## 2022-08-02 NOTE — CONSULT NOTE ADULT - ATTENDING COMMENTS
- - -
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 Has big toe healing ulcer. Prior h/o collection. Has post op wound infection, has wound vac in place.  Plan: check blood and urine culture  Abdominal imaging with sono/CT for any fluid collection  Antibiotics to be adjusted after culture  I was present during and reviewed clinical and lab data as well as assessment and plan as documented by the housestaff as noted. Please contact if any additional questions with any change in clinical condition or on availability of any additional information or reports.    Will follow

## 2022-09-17 NOTE — CONSULT NOTE ADULT - PROBLEM SELECTOR RECOMMENDATION 3
in immunosuppressed pt.   - F/U BCx, UCx.  - F/U workup for right foot toe ulcer.   - C/w immunosuppressants for now as pt does not appear overtly septic.
show

## 2022-11-10 NOTE — DISCHARGE NOTE ADULT - MEDICATION SUMMARY - MEDICATIONS TO CHANGE
I will SWITCH the dose or number of times a day I take the medications listed below when I get home from the hospital:    Lantus 100 units/mL subcutaneous solution  -- 20 unit(s) subcutaneous once a day (at bedtime) no

## 2022-11-23 NOTE — ED ADULT NURSE NOTE - CAS DISCH BELONGINGS RETURNED
Yes Rhofade Pregnancy And Lactation Text: This medication has not been assigned a Pregnancy Risk Category. It is unknown if the medication is excreted in breast milk.

## 2024-06-27 NOTE — PROGRESS NOTE ADULT - PROBLEM/PLAN-4
From: Carlitos Das  To: Dr. Rd Bose  Sent: 6/27/2024 2:05 PM EDT  Subject: Pre-employment Physical     Thank you for your assistance with getting labs a little early. I'm attaching the letter that the pre-employment physical company sent. I'm on medical hold until this is resolved.     A1C won't change that much between now and July 15 visit, will it? The minimum has to reach 8 before they will allow me to accept the job.     Any help with the letter or suggestions on the A1C test would be appreciated.   
DISPLAY PLAN FREE TEXT

## 2024-09-23 NOTE — PROGRESS NOTE ADULT - PROBLEM SELECTOR PROBLEM 3
"GENERAL SURGERY PROGRESS NOTE    Gilberto White   1963   [id number]     Gilberto White is a 60 y.o. male on day 1 of admission presenting with Acute appendicitis with appendiceal abscess.    Appendectomy Laparoscopy on 9/22, 1 Day Post-Op    Subjective  PT EDMUND, KEOON, tolerated procedure yesterday well. Having minimal pain and toleration liquids. Swapnil w/ 195 ml serosanguinous output    Review of Systems:  Review of Systems   Constitutional:  Negative for chills, fatigue and fever.   Respiratory:  Negative for cough, chest tightness and shortness of breath.    Gastrointestinal:  Positive for abdominal pain. Negative for constipation, diarrhea, nausea and vomiting.   Neurological:  Negative for dizziness, light-headedness and headaches.       Objective    Last Recorded Vitals  Blood pressure 137/65, pulse 77, temperature 36.8 °C (98.3 °F), temperature source Temporal, resp. rate 17, height 1.727 m (5' 8\"), weight 80.7 kg (178 lb), SpO2 94%.    Intake/Output last 3 Shifts:  I/O last 3 completed shifts:  In: 2951.7 (36.6 mL/kg) [I.V.:2751.7 (34.1 mL/kg); IV Piggyback:200]  Out: 983 (12.2 mL/kg) [Urine:750 (0.3 mL/kg/hr); Drains:230; Blood:3]  Weight: 80.7 kg     Intake/Output Summary (Last 24 hours) at 9/23/2024 1411  Last data filed at 9/23/2024 1402  Gross per 24 hour   Intake 3403.33 ml   Output 1758 ml   Net 1645.33 ml       Physical Exam  Constitutional:       General: He is not in acute distress.     Appearance: Normal appearance. He is normal weight.   HENT:      Head: Normocephalic and atraumatic.      Mouth/Throat:      Mouth: Mucous membranes are moist.      Pharynx: Oropharynx is clear.   Cardiovascular:      Rate and Rhythm: Normal rate.      Pulses: Normal pulses.   Pulmonary:      Effort: Pulmonary effort is normal. No respiratory distress.   Chest:      Chest wall: No tenderness.   Abdominal:      Comments: Soft, non distended, incisional tenderness, dressings c/d/I, swapnil w/ 195 ml serosang output, "   Skin:     General: Skin is warm and dry.   Neurological:      Mental Status: He is alert.         Relevant Results  Labs:  Results for orders placed or performed during the hospital encounter of 09/22/24 (from the past 24 hour(s))   Extra Urine Gray Tube   Result Value Ref Range    Extra Tube Hold for add-ons.    CBC and Auto Differential   Result Value Ref Range    WBC 9.3 4.4 - 11.3 x10*3/uL    nRBC 0.0 0.0 - 0.0 /100 WBCs    RBC 3.18 (L) 4.50 - 5.90 x10*6/uL    Hemoglobin 10.2 (L) 13.5 - 17.5 g/dL    Hematocrit 30.1 (L) 41.0 - 52.0 %    MCV 95 80 - 100 fL    MCH 32.1 26.0 - 34.0 pg    MCHC 33.9 32.0 - 36.0 g/dL    RDW 12.0 11.5 - 14.5 %    Platelets 209 150 - 450 x10*3/uL    Immature Granulocytes %, Automated 0.6 0.0 - 0.9 %    Immature Granulocytes Absolute, Automated 0.06 0.00 - 0.70 x10*3/uL   Comprehensive metabolic panel   Result Value Ref Range    Glucose 120 (H) 74 - 99 mg/dL    Sodium 137 136 - 145 mmol/L    Potassium 4.0 3.5 - 5.3 mmol/L    Chloride 106 98 - 107 mmol/L    Bicarbonate 23 21 - 32 mmol/L    Anion Gap 12 10 - 20 mmol/L    Urea Nitrogen 89 (H) 6 - 23 mg/dL    Creatinine 2.27 (H) 0.50 - 1.30 mg/dL    eGFR 32 (L) >60 mL/min/1.73m*2    Calcium 8.2 (L) 8.6 - 10.3 mg/dL    Albumin 3.9 3.4 - 5.0 g/dL    Alkaline Phosphatase 79 33 - 136 U/L    Total Protein 7.3 6.4 - 8.2 g/dL    AST 26 9 - 39 U/L    Bilirubin, Total 0.7 0.0 - 1.2 mg/dL    ALT 25 10 - 52 U/L   Lipase   Result Value Ref Range    Lipase 32 9 - 82 U/L   Manual Differential   Result Value Ref Range    Neutrophils %, Manual 80.0 40.0 - 80.0 %    Lymphocytes %, Manual 8.0 13.0 - 44.0 %    Monocytes %, Manual 5.0 2.0 - 10.0 %    Eosinophils %, Manual 1.0 0.0 - 6.0 %    Basophils %, Manual 1.0 0.0 - 2.0 %    Atypical Lymphocytes %, Manual 5.0 0.0 - 2.0 %    Seg Neutrophils Absolute, Manual 7.44 (H) 1.20 - 7.00 x10*3/uL    Lymphocytes Absolute, Manual 0.74 (L) 1.20 - 4.80 x10*3/uL    Monocytes Absolute, Manual 0.47 0.10 - 1.00 x10*3/uL     Eosinophils Absolute, Manual 0.09 0.00 - 0.70 x10*3/uL    Basophils Absolute, Manual 0.09 0.00 - 0.10 x10*3/uL    Atypical Lymphs Absolute, Manual 0.47 0.00 - 0.50 x10*3/uL    Total Cells Counted 100     RBC Morphology No significant RBC morphology present    Urinalysis with Reflex Culture and Microscopic   Result Value Ref Range    Color, Urine Light-Yellow Light-Yellow, Yellow, Dark-Yellow    Appearance, Urine Clear Clear    Specific Gravity, Urine 1.011 1.005 - 1.035    pH, Urine 5.0 5.0, 5.5, 6.0, 6.5, 7.0, 7.5, 8.0    Protein, Urine 20 (TRACE) NEGATIVE, 10 (TRACE), 20 (TRACE) mg/dL    Glucose, Urine OVER (4+) (A) Normal mg/dL    Blood, Urine 1.0 (3+) (A) NEGATIVE    Ketones, Urine NEGATIVE NEGATIVE mg/dL    Bilirubin, Urine NEGATIVE NEGATIVE    Urobilinogen, Urine Normal Normal mg/dL    Nitrite, Urine NEGATIVE NEGATIVE    Leukocyte Esterase, Urine NEGATIVE NEGATIVE   Urinalysis Microscopic   Result Value Ref Range    WBC, Urine 1-5 1-5, NONE /HPF    RBC, Urine 11-20 (A) NONE, 1-2, 3-5 /HPF    Squamous Epithelial Cells, Urine 1-9 (SPARSE) Reference range not established. /HPF    Mucus, Urine FEW Reference range not established. /LPF    Hyaline Casts, Urine 2+ (A) NONE /LPF    Fine Granular Casts, Urine 2+ (A) NONE /LPF   Tissue/Wound Culture/Smear    Specimen: Other (specify in comments); Tissue/Biopsy   Result Value Ref Range    Tissue/Wound Culture/Smear Culture in progress     Gram Stain (2+) Few Polymorphonuclear leukocytes (A)     Gram Stain (1+) Rare Gram negative bacilli (A)    Electrocardiogram, 12-lead PRN ACS symptoms   Result Value Ref Range    Ventricular Rate 83 BPM    Atrial Rate 84 BPM    PA Interval 194 ms    QRS Duration 99 ms    QT Interval 403 ms    QTC Calculation(Bazett) 474 ms    P Axis 28 degrees    R Axis -11 degrees    T Axis 31 degrees    QRS Count 14 beats    Q Onset 252 ms    T Offset 454 ms    QTC Fredericia 449 ms   CBC and Auto Differential   Result Value Ref Range    WBC 13.3 (H)  4.4 - 11.3 x10*3/uL    nRBC 0.0 0.0 - 0.0 /100 WBCs    RBC 3.13 (L) 4.50 - 5.90 x10*6/uL    Hemoglobin 10.0 (L) 13.5 - 17.5 g/dL    Hematocrit 29.2 (L) 41.0 - 52.0 %    MCV 93 80 - 100 fL    MCH 31.9 26.0 - 34.0 pg    MCHC 34.2 32.0 - 36.0 g/dL    RDW 11.9 11.5 - 14.5 %    Platelets 215 150 - 450 x10*3/uL    Neutrophils % 88.6 40.0 - 80.0 %    Immature Granulocytes %, Automated 0.8 0.0 - 0.9 %    Lymphocytes % 3.5 13.0 - 44.0 %    Monocytes % 6.2 2.0 - 10.0 %    Eosinophils % 0.5 0.0 - 6.0 %    Basophils % 0.4 0.0 - 2.0 %    Neutrophils Absolute 11.84 (H) 1.20 - 7.70 x10*3/uL    Immature Granulocytes Absolute, Automated 0.10 0.00 - 0.70 x10*3/uL    Lymphocytes Absolute 0.46 (L) 1.20 - 4.80 x10*3/uL    Monocytes Absolute 0.82 0.10 - 1.00 x10*3/uL    Eosinophils Absolute 0.06 0.00 - 0.70 x10*3/uL    Basophils Absolute 0.05 0.00 - 0.10 x10*3/uL   Comprehensive Metabolic Panel   Result Value Ref Range    Glucose 173 (H) 74 - 99 mg/dL    Sodium 139 136 - 145 mmol/L    Potassium 4.3 3.5 - 5.3 mmol/L    Chloride 109 (H) 98 - 107 mmol/L    Bicarbonate 19 (L) 21 - 32 mmol/L    Anion Gap 15 10 - 20 mmol/L    Urea Nitrogen 76 (H) 6 - 23 mg/dL    Creatinine 2.24 (H) 0.50 - 1.30 mg/dL    eGFR 32 (L) >60 mL/min/1.73m*2    Calcium 7.7 (L) 8.6 - 10.3 mg/dL    Albumin 3.3 (L) 3.4 - 5.0 g/dL    Alkaline Phosphatase 64 33 - 136 U/L    Total Protein 6.2 (L) 6.4 - 8.2 g/dL    AST 18 9 - 39 U/L    Bilirubin, Total 0.6 0.0 - 1.2 mg/dL    ALT 18 10 - 52 U/L   POCT GLUCOSE   Result Value Ref Range    POCT Glucose 155 (H) 74 - 99 mg/dL   POCT GLUCOSE   Result Value Ref Range    POCT Glucose 158 (H) 74 - 99 mg/dL       Images:  CT abdomen pelvis wo IV contrast   Final Result   1.  Acute appendicitis. There is a markedly dilated (1.8 cm) and   thick-walled appendix containing some radiopaque material proximally,   suspect there is probably an appendicolith within although difficult   to measure. Periappendiceal fat stranding is present,  however no   definite abscess or evidence of perforation.   2. Bilateral nonobstructing nephrolithiasis. Perinephric edema is   symmetric and nonspecific. Correlate for any clinical evidence of UTI.   3. Ancillary findings as above.             Signed by: Anna Stephens 9/22/2024 4:09 PM   Dictation workstation:   [id number]          Assessment and Plan  Principal Problem:    Acute appendicitis with appendiceal abscess    60 y.o. male with acute appendicitis and MARICARMEN  - Nephro consulted for elevated Cre, recs pending  - CLD and IV maintenance fluids till on adequate PO intake  - PRN pain and nausea medications  - Continue to monitor MIKI drain  - Continue IV Zosyn  - Midline dressing to come down tomorrow    Discussed with attending Dr. Ryan Huitron, DO - PGY2  General Surgery      Immunosuppression

## 2025-07-02 NOTE — ED ADULT NURSE NOTE - CAS TRG GENERAL NORM CIRC DET
1 [id number] ** 05/30/2025 05/30/2025 traMADol HCL (Tablet) 60.0 30 50 MG 20.0 [facility] Medicare 0 / 0 PA    1 [id number] ** 05/01/2025 05/01/2025 traMADol HCL (Tablet) 60.0 30 50 MG 20.0 [facility] Medicare 0 / 0 PA    1 [id number] ** 04/01/2025 04/01/2025 traMADol HCL (Tablet) 60.0 30 50 MG 20.0 [facility] Medicare 0 / 0 PA    1 [id number] ** 03/07/2025 03/07/2025 HYDROmorphone HCL (Tablet) 28.0 7 4 MG 64.0 [facility] Medicare 0 / 0 PA         Strong peripheral pulses